# Patient Record
Sex: MALE | Race: WHITE | NOT HISPANIC OR LATINO | Employment: OTHER | URBAN - METROPOLITAN AREA
[De-identification: names, ages, dates, MRNs, and addresses within clinical notes are randomized per-mention and may not be internally consistent; named-entity substitution may affect disease eponyms.]

---

## 2017-07-24 LAB — CRC RECOMMENDATION EXT: NORMAL

## 2017-07-31 ENCOUNTER — TELEPHONE (OUTPATIENT)
Dept: NEUROLOGY | Facility: HOSPITAL | Age: 52
End: 2017-07-31

## 2017-07-31 DIAGNOSIS — C7A.012 MALIGNANT CARCINOID TUMOR OF THE ILEUM: Primary | ICD-10-CM

## 2017-07-31 RX ORDER — LANREOTIDE ACETATE 120 MG/.5ML
120 INJECTION SUBCUTANEOUS
COMMUNITY
End: 2020-05-27 | Stop reason: SDUPTHER

## 2017-07-31 NOTE — LETTER
July 31, 2017        LIS Nesbitt MD  [address] [place] [address]             [facility]  [address] [place] [address]  Phone: [phone]  Fax: [phone]   Patient: Omid Falcon   MR Number: [id number]   YOB: 1965   Date of Visit: 7/31/2017     Dear Dr. Nesbitt,     We contacted Mr. Falcon regarding setting up an appointment for an evaluation at our center.  We scheduled a MRI, octreoscan, echocardiogram, blood tumor markers and a pathology re read over the next couple of weeks.  We also scheduled an appointment with Dr. NEEL Gould MD on Friday, September 1, 2017 at 11:00 AM for recommendations.  We will forward you a copy of the clinic note after the visit.      Thank you for considering our program and for referring this patient.  If you have any questions, please do not hesitate to contact us.      Sincerely,      Tatum Thornton RN

## 2017-07-31 NOTE — TELEPHONE ENCOUNTER
----- Message from Gaby Thornton sent at 7/28/2017 10:08 AM CDT -----  Contact: Call from Sofiya with Dr. Nesbitt's office  EAW/New patient - Who called:Call from Sofiya with Dr. Nesbitt's office [phone]    Referred by: Dr. Nesbitt    Why do you need to be seen? Carcinoid tumor    Which doctor are you requesting? Bryanna    You may contact patient back to schedule at: [phone]    Dr. Nesbitt will fax over patients information today.

## 2017-07-31 NOTE — TELEPHONE ENCOUNTER
Ordered octreoscan, MRI, tumor markers and path re read per protocol. Appointment made with MD, info sent to patient. Diagnosed in 1/2016 and had surgery in 2/2016 for terminal ileum.

## 2017-07-31 NOTE — LETTER
July 31, 2017    Omid Ezekiel  [address]  [place] [address]             [facility]  Tumor Program  [address]  [address]  [place] [address]  Phone: [phone]  Fax: [phone] Dear Mr. Falcon:    Thank you for your interest in our program. It was my pleasure speaking with you today about your upcoming appointment.     You have a scheduled appointment with Dr NEEL Gould MD on Friday, September 1, 2017 at 11:00 AM. Our office is located at :    [facility]  Medical Office [facility]  Neuroendocrine Tumor Clinic  [address]  [place], [address]    You are scheduled for a consultation only with the physicians unless otherwise planned. Plan to be here for your visit for 2-3 hrs. If flight arrangements are made, plan to make the return flight after 6 pm if possible. The [place] is [place] 10 minutes from [facility].    In preparation for your appointment, we ask that you gather the information below and fax them to us ASAP. This will enable us to review all pertinent information at the time of your visit so that recommendations can be made and a plan of care developed for you.     Please return forms along with all paper records via fax asap.    Please fax  1. Insurance cards (front and back)-enlarged if possible  2. Drivers licenses  3. Current medicine list  4. Name, address & phone # of your physician for correspondence  5. Authorization for Release of Information-complete and return to clinic  6. Medical Records-send as soon as you have them together (see guidelines below)    Lab Work: If requested, the special lab markers do require special tubes that have to be ordered by the ordering facility. The lab work should be within 3 months.. The labs take 2-3 weeks to get results so please get labs drawn asap. The name and phone # of the send out lab that does the special labs tests is on the order. You can have  the labs drawn at [facility], [facility], a local hospital, or your doctors office (whichever works). If these lab tests need to be done, I will attach an Outpatient Order form. If you are doing your lab work at a facility other than [facility], call our office to notify us the date you have them drawn and the location and phone number of the lab for easy follow up.    Scans: Please mail copies of CD's of your last scans to the office asap. I would recommend sending them overnight with a tracking number in case of any problems. If you need updated scans, I will attach an Outpatient Order form.    Tissue Biopsy/Pathology: If you had a tissue biopsy or surgery, we will request to have your slides and/or tissue blocks sent to us to perform some special testing on them. Please provide us with a pathology report asa. This testing will be billed to your insurance company.     Operative or Procedure reports: All surgery or procedure reports related to your neuroendocrine tumor should be sent to us.    Insurance Company: You should contact your insurance company to inquire about your insurance coverage and benefits. Ask about co-payments and deductibles when seeing a specialist. Ask if this visit will be in Network or Out of Network. We may be able to work with you if this is out of network for you.    Lodging: Attached is lodging information from the [facility] and a list of local hotels. The [facility] is run by the [employer] and is free of charge. [facility], you must call my office and talk to [facility]. You will need to complete the application and send it to my office for a physician signature. We will forward it to the [facility] and they will check availability. If you wish to stay at the [facility], apply EARLY, they fill up quickly. You may contact the [facility] a week later to confirm your reservation and ask any questions regarding the facility. You  May only stay at the [facility] 24 hrs  prior to your appointment and up to 24 hrs after your appointment. (THIS CAN ONLY BE USED IF YOU LIVE MORE THAN 40 MILES FROM OUR FACILITY).    Call me if you have any questions, email is not the best way to communicate with our office.    We are looking forward to meeting and taking care of you. If you have any questions or concerns, please don't hesitate to call.     Sincerely,        Maria Victoria Gabriel, RN, BSN  Nurse Manager, Neuroendocrine Tumor Program

## 2017-08-02 LAB
EXT 24 HR UR METANEPHRINE: NORMAL
EXT 24 HR UR NORMETANEPHRINE: NORMAL
EXT 24 HR UR NORMETANEPHRINE: NORMAL
EXT 25 HYDROXY VIT D2: NORMAL
EXT 25 HYDROXY VIT D3: NORMAL
EXT 5 HIAA 24 HR URINE: NORMAL
EXT 5 HIAA BLOOD: NORMAL
EXT ACTH: NORMAL
EXT AFP: NORMAL
EXT ALBUMIN: 4.5 (ref 3.6–5.1)
EXT ALKALINE PHOSPHATASE: 61 (ref 40–115)
EXT ALT: 30 (ref 9–46)
EXT AMYLASE: NORMAL
EXT ANTI ISLET CELL AB: NORMAL
EXT ANTI PARIETAL CELL AB: NORMAL
EXT ANTI THYROID AB: NORMAL
EXT AST: 23 (ref 10–40)
EXT BILIRUBIN DIRECT: NORMAL MG/DL
EXT BILIRUBIN TOTAL: 0.5 (ref 0.2–1.2)
EXT BK VIRUS DNA QN PCR: NORMAL
EXT BUN: 16 (ref 7–25)
EXT C PEPTIDE: NORMAL
EXT CA 125: NORMAL
EXT CA 19-9: NORMAL
EXT CA 27-29: NORMAL
EXT CALCITONIN: NORMAL
EXT CALCIUM: 10 (ref 8.6–10.3)
EXT CEA: NORMAL
EXT CHLORIDE: 105 (ref 98–110)
EXT CHOLESTEROL: NORMAL
EXT CHROMOGRANIN A: <5
EXT CO2: 28 (ref 20–31)
EXT CREATININE UA: NORMAL
EXT CREATININE: 1.34 MG/DL (ref 0.6–1.35)
EXT CYCLOSPORONE LEVEL: NORMAL
EXT DOPAMINE: NORMAL
EXT EBV DNA BY PCR: NORMAL
EXT EPINEPHRINE: NORMAL
EXT FOLATE: NORMAL
EXT FREE T3: NORMAL
EXT FREE T4: NORMAL
EXT FSH: NORMAL
EXT GASTRIN RELEASING PEPTIDE: NORMAL
EXT GASTRIN RELEASING PEPTIDE: NORMAL
EXT GASTRIN: NORMAL
EXT GGT: NORMAL
EXT GHRELIN: NORMAL
EXT GLUCAGON: NORMAL
EXT GLUCOSE: 93 (ref 65–99)
EXT GROWTH HORMONE: NORMAL
EXT HCV RNA QUANT PCR: NORMAL
EXT HDL: NORMAL
EXT HEMATOCRIT: 44.3 (ref 38.5–50)
EXT HEMOGLOBIN A1C: NORMAL
EXT HEMOGLOBIN: 14.4 (ref 13.2–17.1)
EXT HISTAMINE 24 HR URINE: NORMAL
EXT HISTAMINE: NORMAL
EXT IGF-1: NORMAL
EXT IMMUNKNOW (NON-STIMULATED): NORMAL
EXT IMMUNKNOW (STIMULATED): NORMAL
EXT INR: NORMAL
EXT INSULIN: NORMAL
EXT LANREOTIDE LEVEL: NORMAL
EXT LDH, TOTAL: NORMAL
EXT LDL CHOLESTEROL: NORMAL
EXT LIPASE: NORMAL
EXT MAGNESIUM: NORMAL
EXT METANEPHRINE FREE PLASMA: NORMAL
EXT MOTILIN: NORMAL
EXT NEUROKININ A CAMB: NORMAL
EXT NEUROKININ A ISI: NORMAL
EXT NEUROTENSIN: NORMAL
EXT NOREPINEPHRINE: NORMAL
EXT NORMETANEPHRINE: NORMAL
EXT NSE: NORMAL
EXT OCTREOTIDE LEVEL: NORMAL
EXT PANCREASTATIN CAMB: NORMAL
EXT PANCREASTATIN ISI: NORMAL
EXT PANCREATIC POLYPEPTIDE: NORMAL
EXT PHOSPHORUS: NORMAL
EXT PLATELETS: 187 (ref 140–400)
EXT POTASSIUM: 4.2 (ref 3.5–5.3)
EXT PROGRAF LEVEL: NORMAL
EXT PROLACTIN: NORMAL
EXT PROTEIN TOTAL: 7.6 (ref 6.1–8.1)
EXT PROTEIN UA: NORMAL
EXT PT: NORMAL
EXT PTH, INTACT: NORMAL
EXT PTT: NORMAL
EXT RAPAMUNE LEVEL: NORMAL
EXT SEROTONIN: NORMAL
EXT SODIUM: 141 MMOL/L (ref 135–145)
EXT SOMATOSTATIN: NORMAL
EXT SUBSTANCE P: NORMAL
EXT TRIGLYCERIDES: NORMAL
EXT TRYPTASE: NORMAL
EXT TSH: NORMAL
EXT URIC ACID: NORMAL
EXT URINE AMYLASE U/HR: NORMAL
EXT URINE AMYLASE U/L: NORMAL
EXT VASOACTIVE INTESTINAL POLYPEPTIDE: NORMAL
EXT VITAMIN B12: NORMAL
EXT VMA 24 HR URINE: NORMAL
EXT WBC: 5.8 (ref 3.8–10.8)
NEURON SPECIFIC ENOLASE: NORMAL

## 2017-08-05 LAB
EXT 24 HR UR METANEPHRINE: ABNORMAL
EXT 24 HR UR METANEPHRINE: NORMAL
EXT 24 HR UR NORMETANEPHRINE: ABNORMAL
EXT 24 HR UR NORMETANEPHRINE: ABNORMAL
EXT 24 HR UR NORMETANEPHRINE: NORMAL
EXT 24 HR UR NORMETANEPHRINE: NORMAL
EXT 25 HYDROXY VIT D2: ABNORMAL
EXT 25 HYDROXY VIT D2: NORMAL
EXT 25 HYDROXY VIT D3: ABNORMAL
EXT 25 HYDROXY VIT D3: NORMAL
EXT 5 HIAA 24 HR URINE: ABNORMAL
EXT 5 HIAA 24 HR URINE: NORMAL
EXT 5 HIAA BLOOD: 37 NG/ML
EXT 5 HIAA BLOOD: ABNORMAL
EXT ACTH: ABNORMAL
EXT ACTH: NORMAL
EXT AFP: ABNORMAL
EXT AFP: NORMAL
EXT ALBUMIN: 4.7 (ref 3.6–5.1)
EXT ALBUMIN: NORMAL
EXT ALKALINE PHOSPHATASE: 54 (ref 40–115)
EXT ALKALINE PHOSPHATASE: NORMAL
EXT ALT: 24 (ref 9–46)
EXT ALT: NORMAL
EXT AMYLASE: ABNORMAL
EXT AMYLASE: NORMAL
EXT ANTI ISLET CELL AB: ABNORMAL
EXT ANTI ISLET CELL AB: NORMAL
EXT ANTI PARIETAL CELL AB: ABNORMAL
EXT ANTI PARIETAL CELL AB: NORMAL
EXT ANTI THYROID AB: ABNORMAL
EXT ANTI THYROID AB: NORMAL
EXT AST: 22 (ref 10–40)
EXT AST: NORMAL
EXT BILIRUBIN DIRECT: ABNORMAL MG/DL
EXT BILIRUBIN DIRECT: NORMAL MG/DL
EXT BILIRUBIN TOTAL: 0.8 (ref 0.2–1.2)
EXT BILIRUBIN TOTAL: NORMAL
EXT BK VIRUS DNA QN PCR: ABNORMAL
EXT BK VIRUS DNA QN PCR: NORMAL
EXT BUN: 26 (ref 7–25)
EXT BUN: NORMAL
EXT C PEPTIDE: ABNORMAL
EXT C PEPTIDE: NORMAL
EXT CA 125: ABNORMAL
EXT CA 125: NORMAL
EXT CA 19-9: ABNORMAL
EXT CA 19-9: NORMAL
EXT CA 27-29: ABNORMAL
EXT CA 27-29: NORMAL
EXT CALCITONIN: ABNORMAL
EXT CALCITONIN: NORMAL
EXT CALCIUM: 9.6 (ref 8.6–10.3)
EXT CALCIUM: NORMAL
EXT CEA: ABNORMAL
EXT CEA: NORMAL
EXT CHLORIDE: 105 (ref 98–110)
EXT CHLORIDE: NORMAL
EXT CHOLESTEROL: ABNORMAL
EXT CHOLESTEROL: NORMAL
EXT CHROMOGRANIN A: ABNORMAL
EXT CHROMOGRANIN A: NORMAL
EXT CO2: 27 (ref 20–31)
EXT CO2: NORMAL
EXT CREATININE UA: ABNORMAL
EXT CREATININE UA: NORMAL
EXT CREATININE: 1.18 MG/DL (ref 0.6–1.35)
EXT CREATININE: NORMAL MG/DL
EXT CYCLOSPORONE LEVEL: ABNORMAL
EXT CYCLOSPORONE LEVEL: NORMAL
EXT DOPAMINE: ABNORMAL
EXT DOPAMINE: NORMAL
EXT EBV DNA BY PCR: ABNORMAL
EXT EBV DNA BY PCR: NORMAL
EXT EPINEPHRINE: ABNORMAL
EXT EPINEPHRINE: NORMAL
EXT FOLATE: ABNORMAL
EXT FOLATE: NORMAL
EXT FREE T3: ABNORMAL
EXT FREE T3: NORMAL
EXT FREE T4: ABNORMAL
EXT FREE T4: NORMAL
EXT FSH: ABNORMAL
EXT FSH: NORMAL
EXT GASTRIN RELEASING PEPTIDE: ABNORMAL
EXT GASTRIN RELEASING PEPTIDE: ABNORMAL
EXT GASTRIN RELEASING PEPTIDE: NORMAL
EXT GASTRIN RELEASING PEPTIDE: NORMAL
EXT GASTRIN: ABNORMAL
EXT GASTRIN: NORMAL
EXT GGT: ABNORMAL
EXT GGT: NORMAL
EXT GHRELIN: ABNORMAL
EXT GHRELIN: NORMAL
EXT GLUCAGON: ABNORMAL
EXT GLUCAGON: NORMAL
EXT GLUCOSE: 116 (ref 65–99)
EXT GLUCOSE: NORMAL
EXT GROWTH HORMONE: ABNORMAL
EXT GROWTH HORMONE: NORMAL
EXT HCV RNA QUANT PCR: ABNORMAL
EXT HCV RNA QUANT PCR: NORMAL
EXT HDL: ABNORMAL
EXT HDL: NORMAL
EXT HEMATOCRIT: 43.2 (ref 38.5–50)
EXT HEMATOCRIT: NORMAL
EXT HEMOGLOBIN A1C: ABNORMAL
EXT HEMOGLOBIN A1C: NORMAL
EXT HEMOGLOBIN: 14.7 (ref 13.2–17.1)
EXT HEMOGLOBIN: NORMAL
EXT HISTAMINE 24 HR URINE: ABNORMAL
EXT HISTAMINE 24 HR URINE: NORMAL
EXT HISTAMINE: ABNORMAL
EXT HISTAMINE: NORMAL
EXT IGF-1: ABNORMAL
EXT IGF-1: NORMAL
EXT IMMUNKNOW (NON-STIMULATED): ABNORMAL
EXT IMMUNKNOW (NON-STIMULATED): NORMAL
EXT IMMUNKNOW (STIMULATED): ABNORMAL
EXT IMMUNKNOW (STIMULATED): NORMAL
EXT INR: ABNORMAL
EXT INR: NORMAL
EXT INSULIN: ABNORMAL
EXT INSULIN: NORMAL
EXT LANREOTIDE LEVEL: ABNORMAL
EXT LANREOTIDE LEVEL: NORMAL
EXT LDH, TOTAL: ABNORMAL
EXT LDH, TOTAL: NORMAL
EXT LDL CHOLESTEROL: ABNORMAL
EXT LDL CHOLESTEROL: NORMAL
EXT LIPASE: ABNORMAL
EXT LIPASE: NORMAL
EXT MAGNESIUM: ABNORMAL
EXT MAGNESIUM: NORMAL
EXT METANEPHRINE FREE PLASMA: ABNORMAL
EXT METANEPHRINE FREE PLASMA: NORMAL
EXT MOTILIN: ABNORMAL
EXT MOTILIN: NORMAL
EXT NEUROKININ A CAMB: ABNORMAL
EXT NEUROKININ A CAMB: NORMAL
EXT NEUROKININ A ISI: <10 (ref 0–40)
EXT NEUROKININ A ISI: NORMAL
EXT NEUROTENSIN: ABNORMAL
EXT NEUROTENSIN: NORMAL
EXT NOREPINEPHRINE: ABNORMAL
EXT NOREPINEPHRINE: NORMAL
EXT NORMETANEPHRINE: ABNORMAL
EXT NORMETANEPHRINE: NORMAL
EXT NSE: ABNORMAL
EXT NSE: NORMAL
EXT OCTREOTIDE LEVEL: ABNORMAL
EXT OCTREOTIDE LEVEL: NORMAL
EXT PANCREASTATIN CAMB: ABNORMAL
EXT PANCREASTATIN CAMB: NORMAL
EXT PANCREASTATIN ISI: 64 (ref 10–135)
EXT PANCREASTATIN ISI: NORMAL
EXT PANCREATIC POLYPEPTIDE: ABNORMAL
EXT PANCREATIC POLYPEPTIDE: NORMAL
EXT PHOSPHORUS: ABNORMAL
EXT PHOSPHORUS: NORMAL
EXT PLATELETS: 196 (ref 140–400)
EXT PLATELETS: NORMAL
EXT POTASSIUM: 4.5 (ref 3.5–5.3)
EXT POTASSIUM: NORMAL
EXT PROGRAF LEVEL: ABNORMAL
EXT PROGRAF LEVEL: NORMAL
EXT PROLACTIN: ABNORMAL
EXT PROLACTIN: NORMAL
EXT PROTEIN TOTAL: 7.6 (ref 6.1–8.1)
EXT PROTEIN TOTAL: NORMAL
EXT PROTEIN UA: ABNORMAL
EXT PROTEIN UA: NORMAL
EXT PT: ABNORMAL
EXT PT: NORMAL
EXT PTH, INTACT: ABNORMAL
EXT PTH, INTACT: NORMAL
EXT PTT: ABNORMAL
EXT PTT: NORMAL
EXT RAPAMUNE LEVEL: ABNORMAL
EXT RAPAMUNE LEVEL: NORMAL
EXT SEROTONIN: 131 (ref 56–244)
EXT SEROTONIN: NORMAL
EXT SODIUM: 140 MMOL/L (ref 135–146)
EXT SODIUM: NORMAL MMOL/L
EXT SOMATOSTATIN: ABNORMAL
EXT SOMATOSTATIN: NORMAL
EXT SUBSTANCE P: ABNORMAL
EXT SUBSTANCE P: NORMAL
EXT TRIGLYCERIDES: ABNORMAL
EXT TRIGLYCERIDES: NORMAL
EXT TRYPTASE: ABNORMAL
EXT TRYPTASE: NORMAL
EXT TSH: ABNORMAL
EXT TSH: NORMAL
EXT URIC ACID: ABNORMAL
EXT URIC ACID: NORMAL
EXT URINE AMYLASE U/HR: ABNORMAL
EXT URINE AMYLASE U/HR: NORMAL
EXT URINE AMYLASE U/L: ABNORMAL
EXT URINE AMYLASE U/L: NORMAL
EXT VASOACTIVE INTESTINAL POLYPEPTIDE: ABNORMAL
EXT VASOACTIVE INTESTINAL POLYPEPTIDE: NORMAL
EXT VITAMIN B12: ABNORMAL
EXT VITAMIN B12: NORMAL
EXT VMA 24 HR URINE: ABNORMAL
EXT VMA 24 HR URINE: NORMAL
EXT WBC: 5.7 (ref 3.8–10.8)
EXT WBC: NORMAL
NEURON SPECIFIC ENOLASE: ABNORMAL
NEURON SPECIFIC ENOLASE: NORMAL

## 2017-08-22 ENCOUNTER — LAB VISIT (OUTPATIENT)
Dept: LAB | Facility: HOSPITAL | Age: 52
End: 2017-08-22
Attending: SURGERY
Payer: COMMERCIAL

## 2017-08-22 DIAGNOSIS — C7A.012 MALIGNANT CARCINOID TUMOR OF THE ILEUM: ICD-10-CM

## 2017-08-22 PROCEDURE — 88323 CONSLTJ&REPRT MATRL PREP SLD: CPT | Mod: 26,,,

## 2017-08-22 PROCEDURE — 88323 CONSLTJ&REPRT MATRL PREP SLD: CPT

## 2017-08-22 PROCEDURE — 88360 TUMOR IMMUNOHISTOCHEM/MANUAL: CPT

## 2017-08-22 PROCEDURE — 88360 TUMOR IMMUNOHISTOCHEM/MANUAL: CPT | Mod: 26,59,,

## 2017-08-31 ENCOUNTER — HOSPITAL ENCOUNTER (OUTPATIENT)
Dept: RADIOLOGY | Facility: HOSPITAL | Age: 52
Discharge: HOME | End: 2017-08-31
Attending: SURGERY
Payer: COMMERCIAL

## 2017-08-31 ENCOUNTER — HOSPITAL ENCOUNTER (OUTPATIENT)
Dept: CARDIOLOGY | Facility: HOSPITAL | Age: 52
Discharge: HOME | End: 2017-08-31
Attending: SURGERY
Payer: COMMERCIAL

## 2017-08-31 DIAGNOSIS — C7A.012 MALIGNANT CARCINOID TUMOR OF THE ILEUM: ICD-10-CM

## 2017-08-31 DIAGNOSIS — C7A.012 MALIGNANT CARCINOID TUMOR OF ILEUM: Primary | ICD-10-CM

## 2017-08-31 PROCEDURE — 78804 RP LOCLZJ TUM WHBDY 2+D IMG: CPT | Mod: TC

## 2017-08-31 PROCEDURE — 93306 TTE W/DOPPLER COMPLETE: CPT

## 2017-08-31 PROCEDURE — 74183 MRI ABD W/O CNTR FLWD CNTR: CPT | Mod: 26,,, | Performed by: RADIOLOGY

## 2017-08-31 PROCEDURE — A9585 GADOBUTROL INJECTION: HCPCS | Performed by: SURGERY

## 2017-08-31 PROCEDURE — 78803 RP LOCLZJ TUM SPECT 1 AREA: CPT | Mod: TC

## 2017-08-31 PROCEDURE — 74183 MRI ABD W/O CNTR FLWD CNTR: CPT | Mod: TC

## 2017-08-31 PROCEDURE — 25500020 PHARM REV CODE 255: Performed by: SURGERY

## 2017-08-31 PROCEDURE — 93306 TTE W/DOPPLER COMPLETE: CPT | Mod: 26,,, | Performed by: INTERNAL MEDICINE

## 2017-08-31 PROCEDURE — 74150 CT ABDOMEN W/O CONTRAST: CPT | Mod: 26,,, | Performed by: RADIOLOGY

## 2017-08-31 PROCEDURE — 78804 RP LOCLZJ TUM WHBDY 2+D IMG: CPT | Mod: 26,,, | Performed by: RADIOLOGY

## 2017-08-31 PROCEDURE — 78803 RP LOCLZJ TUM SPECT 1 AREA: CPT | Mod: 26,,, | Performed by: RADIOLOGY

## 2017-08-31 RX ORDER — GADOBUTROL 604.72 MG/ML
10 INJECTION INTRAVENOUS
Status: COMPLETED | OUTPATIENT
Start: 2017-08-31 | End: 2017-08-31

## 2017-08-31 RX ADMIN — GADOBUTROL 10 ML: 604.72 INJECTION INTRAVENOUS at 11:08

## 2017-09-01 ENCOUNTER — HOSPITAL ENCOUNTER (OUTPATIENT)
Dept: RADIOLOGY | Facility: HOSPITAL | Age: 52
Discharge: HOME | End: 2017-09-01
Attending: SURGERY
Payer: COMMERCIAL

## 2017-09-01 ENCOUNTER — OFFICE VISIT (OUTPATIENT)
Dept: NEUROLOGY | Facility: HOSPITAL | Age: 52
End: 2017-09-01
Attending: SURGERY
Payer: COMMERCIAL

## 2017-09-01 VITALS
BODY MASS INDEX: 32.45 KG/M2 | HEART RATE: 55 BPM | TEMPERATURE: 98 F | DIASTOLIC BLOOD PRESSURE: 81 MMHG | WEIGHT: 226.63 LBS | HEIGHT: 70 IN | SYSTOLIC BLOOD PRESSURE: 112 MMHG

## 2017-09-01 DIAGNOSIS — C7A.012 MALIGNANT CARCINOID TUMOR OF THE ILEUM: ICD-10-CM

## 2017-09-01 DIAGNOSIS — C7B.8 SECONDARY NEUROENDOCRINE TUMOR OF LIVER: ICD-10-CM

## 2017-09-01 DIAGNOSIS — C7B.8 SECONDARY NEUROENDOCRINE TUMOR OF DISTANT LYMPH NODES: ICD-10-CM

## 2017-09-01 LAB
DIASTOLIC DYSFUNCTION: YES
ESTIMATED PA SYSTOLIC PRESSURE: 18.68
MITRAL VALVE MOBILITY: NORMAL
RETIRED EF AND QEF - SEE NOTES: 55 (ref 55–65)
TRICUSPID VALVE REGURGITATION: ABNORMAL

## 2017-09-01 PROCEDURE — 78803 RP LOCLZJ TUM SPECT 1 AREA: CPT | Mod: TC

## 2017-09-01 PROCEDURE — 99213 OFFICE O/P EST LOW 20 MIN: CPT | Mod: 25 | Performed by: SURGERY

## 2017-09-01 PROCEDURE — 78803 RP LOCLZJ TUM SPECT 1 AREA: CPT | Mod: 26,,, | Performed by: RADIOLOGY

## 2017-09-01 NOTE — PROGRESS NOTES
"NOLANETS:  [facility]ocrine Tumor Special[facility]  A collaboration between [facility] and [facility]      PATIENT: Omid Falcon  MRN: [id number]  DATE: 9/1/2017    Subjective:      Chief Complaint: Consult (referred by Dr. 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     Oncologic History:     Interval History: new liver disease found on recent MRI. New onset diarrhea, resolved since Dr Frances started lanreotide 2 mos ago.  Incr 5HIAA  By report.    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 No pertinent family history.    Allergies:  Review of patient's allergies indicates:   Allergen Reactions    Rocephin [ceftriaxone] Hives     Flushing feeling       Medications:  Current Outpatient Prescriptions   Medication Sig Dispense Refill    lanreotide (SOMATULINE DEPOT) 120 mg/0.5 mL Syrg Inject 120 mg into the skin every 28 days.      hydrocodone-acetaminophen 10-325mg (NORCO)  mg Tab Take 1 tablet by mouth every 4 to 6 hours as needed. 20 tablet 0     No current facility-administered medications for this visit.        Review of Systems   Constitutional: Positive for fatigue.   Gastrointestinal: Positive for diarrhea.      Objective:    "   Physical Exam   Constitutional: He is oriented to person, place, and time. He appears well-developed and well-nourished.   HENT:   Head: Normocephalic and atraumatic.   Mouth/Throat: Oropharynx is clear and moist. No oropharyngeal exudate.   Eyes: Conjunctivae and EOM are normal. Pupils are equal, round, and reactive to light. Right eye exhibits no discharge. Left eye exhibits no discharge. No scleral icterus.   Neck: Normal range of motion. Neck supple. No JVD present. No tracheal deviation present. No thyromegaly present.   Cardiovascular: Regular rhythm and intact distal pulses.  Exam reveals no gallop and no friction rub.    No murmur heard.  Pulmonary/Chest: Effort normal and breath sounds normal. No respiratory distress. He has no wheezes. He has no rales.   Abdominal: Soft. Bowel sounds are normal. He exhibits no distension and no mass. There is no tenderness. There is no rebound and no guarding. No hernia.   Well healed scar     Genitourinary: Penis normal.   Musculoskeletal: Normal range of motion. He exhibits no edema or tenderness.   Neurological: He is alert and oriented to person, place, and time. He has normal reflexes. No cranial nerve deficit.   Skin: Skin is warm and dry. No rash noted. He is not diaphoretic. No erythema. No pallor.   Psychiatric: He has a normal mood and affect. His behavior is normal. Thought content normal.      Assessment:       No diagnosis found.    Laboratory Data:  Neuroendocrine Labs Latest Ref Rng & Units 9/1/2017 8/31/2017 6/14/2017 12/10/2015 12/10/2015 12/9/2015 12/9/2015   EXT CHROMOGRANIN A <15 - - <5 - - - -   WBC 3.90 - 12.70 K/uL - - - 9.17 - 5.27 -   EXT WBC 3.8 - 10.8 - - 5.8 - - - -   HGB 14.0 - 18.0 g/dL - - - 14.7 - 14.9 -   EXT HGB 13.2 - 17.1 - - 14.4 - - - -   HCT 40.0 - 54.0 % - - - 43.0 - 43.7 -   EXT HCT 38.5 - 50.0 - - 44.3 - - - -   PLATLETS 150 - 350 K/uL - - - 191 - 196 -   EXT PLATLETS 140 - 400 - - 187 - - - -   GLUCOSE 70 - 110 mg/dL - - -  123(H) - 97 -   EXT GLUCOSE 65 - 99 - - 93 - - - -   BUN 9 - 21 mg/dL - - - 21 - 22(H) -   EXT BUN 7 - 25 - - 16 - - - -   CREATININE 0.5 - 1.4 mg/dL - 1.5(H) - 1.55(H) - 1.37 -   EXT CREATININE 0.60 - 1.35 mg/dL - - 1.34 - - - -    - 145 mmol/L - - - 143 - 144 -   EXT  - 145 mmol/L - - 141 - - - -   K 3.5 - 5.1 mmol/L - - - 4.0 - 4.0 -   EXT K 3.5 - 5.3 - - 4.2 - - - -   CHLORIDE 95 - 110 mmol/L - - - 104 - 103 -   EXT CHLORIDE 98 - 110 - - 105 - - - -   CO2 23 - 29 mmol/L - - - 23 - 27 -   EXT CO2 20 - 31 - - 28 - - - -   CALCIUM 8.7 - 10.5 mg/dL - - - 9.5 - 9.3 -   EXT CALCIUM 8.6 - 10.3 - - 10.0 - - - -   PROTEIN, TOTAL 6.0 - 8.4 g/dL - - - 7.8 - 7.9 -   EXT PROTEIN, TOTAL 6.1 - 8.1 - - 7.6 - - - -   ALBUMIN 3.5 - 5.2 g/dL - - - 4.5 - 4.4 -   EXT ALBUMIN 3.6 - 5.1 - - 4.5 - - - -   TOTAL BILIRUBIN 0.1 - 1.0 mg/dL - - - 0.9 - 0.5 -   EXT TOTAL BILIRUBIN 0.2 - 1.2 - - 0.5 - - - -   ALK PHOSPHATASE 38 - 145 U/L - - - 75 - 62 -   EXT ALK PHOSPHATASE 40 - 115 - - 61 - - - -   EXT SGOT (AST) 10 - 40 - - 23 - - - -   SGPT (ALT) 10 - 44 U/L - - - 36 - 36 -   EXT ALT 9 - 46 - - 30 - - - -   SERUM AMYLASE 30 - 110 U/L - - - - - 49 -   Weight - 226 lbs 10 oz - - - 235 lbs 14 oz - 235 lbs 14 oz     MRI 8/31/17  1.  Multiple small lesions scattered throughout the right hepatic lobe concerning for metastatic disease and not evident on the prior CT scan.    RECIST SUMMARY: Date of prior examination for comparison 6/21/17.    Lesion 1: Liver, segment 8 1.1 cm Series 6 Image 3.  Not seen on prior study.  Lesion 2: Liver, segment 5 1.2 cm Series 6 Image 19.  Not seen on prior study.      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 Negative O scan    Plan:       Continue lanreotide  Long D/W patient and spouse about prognosis/diseazxe process  etc.  All Q/A  Ga68 scan  Carcinoid tumor markers today CGA, pancreastatin, serotonin, neurokinin A, plasma 5HIAA  RTC after above to discuss possible debulking, ehsan.  1 hour  Copy report to Brian Nesbitt and Yvrose    Tumor board after gallium.          NEEL Gould MD, FACS  Professor of Surgery, [facility]  Neuroendocrine Surgery, Hepatic/Pancreatic & General Surgery  [address], [address]  [place]  [address]  ph. [phone]; [phone]  fax. [phone]

## 2017-09-01 NOTE — PATIENT INSTRUCTIONS
Gallium Scan at [facility] 9/12/2017 at 7am    [address]     We will discuss you in tumor board on 9/19/17 and you will follow up in clinic the following Friday

## 2017-09-12 ENCOUNTER — HOSPITAL ENCOUNTER (OUTPATIENT)
Dept: RADIOLOGY | Facility: HOSPITAL | Age: 52
Discharge: HOME | End: 2017-09-12
Attending: SURGERY
Payer: COMMERCIAL

## 2017-09-12 DIAGNOSIS — C7B.8 SECONDARY NEUROENDOCRINE TUMOR OF DISTANT LYMPH NODES: ICD-10-CM

## 2017-09-12 DIAGNOSIS — C7B.8 SECONDARY NEUROENDOCRINE TUMOR OF LIVER: ICD-10-CM

## 2017-09-12 PROCEDURE — A9587 GALLIUM GA-68: HCPCS

## 2017-09-12 PROCEDURE — 78815 PET IMAGE W/CT SKULL-THIGH: CPT | Mod: 26,PI,, | Performed by: RADIOLOGY

## 2017-09-19 ENCOUNTER — CONFERENCE (OUTPATIENT)
Dept: NEUROLOGY | Facility: HOSPITAL | Age: 52
End: 2017-09-19

## 2017-09-19 NOTE — TELEPHONE ENCOUNTER
[facility]      NEUROENDOCRINE TUMOR MULTIDISCIPLINARY TUMOR BOARD  ________________________________________________________________________    CLINIC #: [id number]  DATE: 09/19/2017    PRESENTER:   FRENCH Gould MD    REASON FOR PRESENTATION:  Scan Review - Ga scan    SPECIALITIES PRESENT:   Surgical Oncology, Medical Oncology, Pathology, Radiology and Nursing    PATIENT STATUS:  Established Patient    PRIMARY TUMOR SITE:  Ileum    METASTATIC TUMOR SITE:  Lymph Nodes    PATIENT SUMMARY:   No history exists.       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    BOARD RECOMMENDATIONS:     Continue to follow with MRI and CT scan in 6 mos and markers every 3 mos.  Jpb to call pt with plan.  Set up with Dr. Edwards or Anand after 6 mos scans.      CONSULT NEEDED:    None

## 2017-09-22 ENCOUNTER — OFFICE VISIT (OUTPATIENT)
Dept: NEUROLOGY | Facility: HOSPITAL | Age: 52
End: 2017-09-22
Attending: SURGERY
Payer: COMMERCIAL

## 2017-09-22 VITALS
HEIGHT: 70 IN | HEART RATE: 59 BPM | DIASTOLIC BLOOD PRESSURE: 78 MMHG | TEMPERATURE: 98 F | BODY MASS INDEX: 32.38 KG/M2 | WEIGHT: 226.19 LBS | SYSTOLIC BLOOD PRESSURE: 116 MMHG

## 2017-09-22 DIAGNOSIS — E34.0 CARCINOID SYNDROME: ICD-10-CM

## 2017-09-22 DIAGNOSIS — C7B.8 SECONDARY NEUROENDOCRINE TUMOR OF LIVER: Primary | ICD-10-CM

## 2017-09-22 PROBLEM — E34.00 CARCINOID SYNDROME: Status: ACTIVE | Noted: 2017-09-22

## 2017-09-22 PROCEDURE — 99213 OFFICE O/P EST LOW 20 MIN: CPT | Performed by: SURGERY

## 2017-09-22 NOTE — LETTER
September 22, 2017      [facility] [address] [place] [address]  Phone: [phone]  Fax: [phone]       Patient: Omid Falcon   YOB: 1965  Date of Visit: 09/22/2017    To Whom It May Concern:    Tex Falcon  was at [facility] on 09/22/2017 caring for his family member. He may return to work/school on 9/25/2017 with no restrictions. If you have any questions or concerns, or if I can be of further assistance, please do not hesitate to contact me.    Sincerely,        Eloina Baker LPN for   Dr. NEEL Gould

## 2017-09-22 NOTE — PROGRESS NOTES
"NOLANETS:  [facility]ocrine Tumor [facility]  A collaboration between [facility] and [facility]      PATIENT: Omid Falcon  MRN: [id number]  DATE: 9/22/2017    Subjective:      Chief Complaint: Consult (referred by Nhi Nesbitt/Yvrose for carcinoid of terminal ileum with liver mets. (had SB resection 2016) F/U after scans.      Vitals:   Vitals:    09/22/17 1351   BP: 116/78   Pulse: (!) 59   Temp: 97.9 °F (36.6 °C)   TempSrc: Oral   Weight: 102.6 kg (226 lb 3.1 oz)   Height: 5' 10" (1.778 m)        Karnofsky Score:       Diagnosis:   1. Secondary neuroendocrine tumor of liver    2. Carcinoid syndrome         Oncologic History:     Interval History: new liver disease found on recent MRI. New onset diarrhea, resolved since Dr Frances started lanreotide 2 mos ago.  Incr 5HIAA  By report.  Ga 68 scan shows only 2 small tumors in liver. MRI multiple small tumors.     TUMOR BOARD RECOMMENDATIONS:      Continue to follow with MRI and CT scan in 6 mos and markers every 3 mos.  Follow for progressio.  TACE or PRRT at time of progression  Continue lanreotide.    Set up with Dr. Edwards or Anand after 6 mos/ scans.       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 No pertinent family history.    Allergies:  Review of patient's allergies indicates:   Allergen Reactions    Rocephin [ceftriaxone] Hives     Flushing feeling       Medications:  Current Outpatient Prescriptions   Medication Sig Dispense Refill    lanreotide (SOMATULINE DEPOT) 120 mg/0.5 mL Syrg Inject 120 mg into the skin every 28 " days.       No current facility-administered medications for this visit.        Review of Systems   Constitutional: Positive for fatigue.   Gastrointestinal: Positive for diarrhea.      Objective:      Physical Exam   Constitutional: He is oriented to person, place, and time. He appears well-developed and well-nourished.   HENT:   Head: Normocephalic and atraumatic.   Mouth/Throat: Oropharynx is clear and moist. No oropharyngeal exudate.   Eyes: Conjunctivae and EOM are normal. Pupils are equal, round, and reactive to light. Right eye exhibits no discharge. Left eye exhibits no discharge. No scleral icterus.   Neck: Normal range of motion. Neck supple. No JVD present. No tracheal deviation present. No thyromegaly present.   Cardiovascular: Regular rhythm and intact distal pulses.  Exam reveals no gallop and no friction rub.    No murmur heard.  Pulmonary/Chest: Effort normal and breath sounds normal. No respiratory distress. He has no wheezes. He has no rales.   Abdominal: Soft. Bowel sounds are normal. He exhibits no distension and no mass. There is no tenderness. There is no rebound and no guarding. No hernia.   Well healed scar     Genitourinary: Penis normal.   Musculoskeletal: Normal range of motion. He exhibits no edema or tenderness.   Neurological: He is alert and oriented to person, place, and time. He has normal reflexes. No cranial nerve deficit.   Skin: Skin is warm and dry. No rash noted. He is not diaphoretic. No erythema. No pallor.   Psychiatric: He has a normal mood and affect. His behavior is normal. Thought content normal.      Assessment:       1. Secondary neuroendocrine tumor of liver    2. Carcinoid syndrome        Laboratory Data:    Neuroendocrine Labs Latest Ref Rng & Units 9/22/2017 9/1/2017 8/31/2017 8/5/2017 6/14/2017 12/10/2015 12/10/2015   EXT 5 HIAA BLOOD up to 22 ng/ml - - - 37 - - -   EXT SEROTONIN 56 - 244 - - - 131 - - -   EXT CHROMOGRANIN A <15 - - - - <5 - -   EXT PANCREASTATIN  KIM 10 - 135 - - - 64 - - -   EXT NEUROKININ A KIM 0 - 40 - - - <10 - - -   WBC 3.90 - 12.70 K/uL - - - - - 9.17 -   EXT WBC 3.8 - 10.8 - - - 5.7 5.8 - -   HGB 14.0 - 18.0 g/dL - - - - - 14.7 -   EXT HGB 13.2 - 17.1 - - - 14.7 14.4 - -   HCT 40.0 - 54.0 % - - - - - 43.0 -   EXT HCT 38.5 - 50 - - - 43.2 44.3 - -   PLATLETS 150 - 350 K/uL - - - - - 191 -   EXT PLATLETS 140 - 400 - - - 196 187 - -   GLUCOSE 70 - 110 mg/dL - - - - - 123(H) -   EXT GLUCOSE 65 - 99 - - - 116(A) 93 - -   BUN 9 - 21 mg/dL - - - - - 21 -   EXT BUN 7 - 25 - - - 26(A) 16 - -   CREATININE 0.5 - 1.4 mg/dL - - 1.5(H) - - 1.55(H) -   EXT CREATININE 0.6 - 1.35 mg/dL - - - 1.18 1.34 - -    - 145 mmol/L - - - - - 143 -   EXT  - 146 mmol/L - - - 140 141 - -   K 3.5 - 5.1 mmol/L - - - - - 4.0 -   EXT K 3.5 - 5.3 - - - 4.5 4.2 - -   CHLORIDE 95 - 110 mmol/L - - - - - 104 -   EXT CHLORIDE 98 - 110 - - - 105 105 - -   CO2 23 - 29 mmol/L - - - - - 23 -   EXT CO2 20 - 31 - - - 27 28 - -   CALCIUM 8.7 - 10.5 mg/dL - - - - - 9.5 -   EXT CALCIUM 8.6 - 10.3 - - - 9.6 10.0 - -   PROTEIN, TOTAL 6.0 - 8.4 g/dL - - - - - 7.8 -   EXT PROTEIN, TOTAL 6.1 - 8.1 - - - 7.6 7.6 - -   ALBUMIN 3.5 - 5.2 g/dL - - - - - 4.5 -   EXT ALBUMIN 3.6 - 5.1 - - - 4.7 4.5 - -   TOTAL BILIRUBIN 0.1 - 1.0 mg/dL - - - - - 0.9 -   EXT TOTAL BILIRUBIN 0.2 - 1.2 - - - 0.8 0.5 - -   ALK PHOSPHATASE 38 - 145 U/L - - - - - 75 -   EXT ALK PHOSPHATASE 40 - 115 - - - 54 61 - -   EXT SGOT (AST) 10 - 40 - - - 22 23 - -   SGPT (ALT) 10 - 44 U/L - - - - - 36 -   EXT ALT 9 - 46 - - - 24 30 - -   SERUM AMYLASE 30 - 110 U/L - - - - - - -   Weight - 226 lbs 3 oz 226 lbs 10 oz - - - - 235 lbs 14 oz     MRI 8/31/17  1.  Multiple small lesions scattered throughout the right hepatic lobe concerning for metastatic disease and not evident on the prior CT scan.    RECIST SUMMARY: Date of prior examination for comparison 6/21/17.    Lesion 1: Liver, segment 8 1.1 cm Series 6 Image 3.  Not seen on  prior study.  Lesion 2: Liver, segment 5 1.2 cm Series 6 Image 19.  Not seen on prior study.      Electronically signed by: LION OROPEZA MD  Date: 08/31/17  Time: 12:35     Encounter     View Encounter           GA68 SCAN:      1.  Two focal areas of abnormal radiotracer uptake within the right hepatic lobe corresponding to lesions characterized on recent MRI and concern for carcinoid tumor metastasis.    Impression: metaatatic carcinoid tumor to  liver, syndrome controlled with lanreotide. Negative O scan  + Ga68 scan liver.    Plan:       Continue lanreotide  Pt counseled about possibility of GB problems in future and need for nicolle Gtt if goes to OR  Long D/W patient and spouse about prognosis/diseaze process etc.  Carcinoid tumor markers Q 3 mos.   Repeat MRI , Ga68 scan in 6 mos  Intervention not indicated at this time  Copy report to Brian Nesbitt and Yvrose  Avoid epinephrine  RTC 6 mos see EW or me            NEEL Gould MD, FACS  Professor of Surgery, [facility]  Neuroendocrine Surgery, Hepatic/Pancreatic & General Surgery  [address]  [place]  [address]  ph. [phone]; [phone]  fax. [phone]

## 2018-01-31 ENCOUNTER — TELEPHONE (OUTPATIENT)
Dept: NEUROLOGY | Facility: HOSPITAL | Age: 53
End: 2018-01-31

## 2018-01-31 DIAGNOSIS — C7B.8 NEUROENDOCRINE CARCINOMA METASTATIC TO LIVER: Primary | ICD-10-CM

## 2018-01-31 DIAGNOSIS — C78.7 LIVER METASTASIS: ICD-10-CM

## 2018-01-31 DIAGNOSIS — C78.7 SECONDARY MALIGNANT NEOPLASM OF LIVER AND INTRAHEPATIC BILE DUCT: ICD-10-CM

## 2018-01-31 DIAGNOSIS — C7A.8 NEUROENDOCRINE CARCINOMA METASTATIC TO LIVER: Primary | ICD-10-CM

## 2018-01-31 NOTE — TELEPHONE ENCOUNTER
Returned pts call. Discussed scans and labs, scans scheduled on behalf of pt, lab orders sent to pts home address. New pt appt with Dr. Edwards made, pt has already been previously worked up as new pt prior to appointment with Dr. Gould.

## 2018-01-31 NOTE — TELEPHONE ENCOUNTER
----- Message from Vicki Daley sent at 1/30/2018 12:59 PM CST -----  EAW/NEW-Patient was seen initially by Dr. Gould but is 6 month follow up Dr. Gould recommended him to Dr. Edwards so he would like to make that appointment in March but he also thinks he needs testing set up as well. Please call back to assist at [phone].

## 2018-02-07 LAB
EXT 24 HR UR METANEPHRINE: ABNORMAL
EXT 24 HR UR NORMETANEPHRINE: ABNORMAL
EXT 24 HR UR NORMETANEPHRINE: ABNORMAL
EXT 25 HYDROXY VIT D2: ABNORMAL
EXT 25 HYDROXY VIT D3: ABNORMAL
EXT 5 HIAA 24 HR URINE: ABNORMAL
EXT 5 HIAA BLOOD: 15 PG/ML (ref 10–135)
EXT ACTH: ABNORMAL
EXT AFP: ABNORMAL
EXT ALBUMIN: 4.9 G/DL (ref 3.6–5.1)
EXT ALKALINE PHOSPHATASE: 54 U/L (ref 40–115)
EXT ALT: 20 U/L (ref 9–46)
EXT AMYLASE: ABNORMAL
EXT ANTI ISLET CELL AB: ABNORMAL
EXT ANTI PARIETAL CELL AB: ABNORMAL
EXT ANTI THYROID AB: ABNORMAL
EXT AST: 19 U/L (ref 10–35)
EXT BILIRUBIN DIRECT: ABNORMAL
EXT BILIRUBIN TOTAL: 0.5 MG/DL (ref 0.2–1.2)
EXT BK VIRUS DNA QN PCR: ABNORMAL
EXT BUN: 22 MG/DL (ref 7–25)
EXT C PEPTIDE: ABNORMAL
EXT CA 125: ABNORMAL
EXT CA 19-9: ABNORMAL
EXT CA 27-29: ABNORMAL
EXT CALCITONIN: ABNORMAL
EXT CALCIUM: 9.8 MG/DL (ref 8.6–10.3)
EXT CEA: ABNORMAL
EXT CHLORIDE: 107 MMOL/L (ref 98–110)
EXT CHOLESTEROL: ABNORMAL
EXT CHROMOGRANIN A: 20 NG/ML (ref 25–140)
EXT CO2: 28 MMOL/L (ref 20–31)
EXT CREATININE UA: ABNORMAL
EXT CREATININE: 1.67 MG/DL (ref 0.7–1.33)
EXT CYCLOSPORONE LEVEL: ABNORMAL
EXT DOPAMINE: ABNORMAL
EXT EBV DNA BY PCR: ABNORMAL
EXT EPINEPHRINE: ABNORMAL
EXT FOLATE: ABNORMAL
EXT FREE T3: ABNORMAL
EXT FREE T4: ABNORMAL
EXT FSH: ABNORMAL
EXT GASTRIN RELEASING PEPTIDE: ABNORMAL
EXT GASTRIN RELEASING PEPTIDE: ABNORMAL
EXT GASTRIN: ABNORMAL
EXT GGT: ABNORMAL
EXT GHRELIN: ABNORMAL
EXT GLUCAGON: ABNORMAL
EXT GLUCOSE: 129 MG/DL (ref 65–99)
EXT GROWTH HORMONE: ABNORMAL
EXT HCV RNA QUANT PCR: ABNORMAL
EXT HDL: ABNORMAL
EXT HEMATOCRIT: 43 % (ref 35.5–50)
EXT HEMOGLOBIN A1C: ABNORMAL
EXT HEMOGLOBIN: 14.9 G/DL (ref 13.2–17.1)
EXT HISTAMINE 24 HR URINE: ABNORMAL
EXT HISTAMINE: ABNORMAL
EXT IGF-1: ABNORMAL
EXT IMMUNKNOW (NON-STIMULATED): ABNORMAL
EXT IMMUNKNOW (STIMULATED): ABNORMAL
EXT INR: ABNORMAL
EXT INSULIN: ABNORMAL
EXT LANREOTIDE LEVEL: 2551 PG/ML
EXT LDH, TOTAL: ABNORMAL
EXT LDL CHOLESTEROL: ABNORMAL
EXT LIPASE: ABNORMAL
EXT MAGNESIUM: ABNORMAL
EXT METANEPHRINE FREE PLASMA: ABNORMAL
EXT MOTILIN: ABNORMAL
EXT NEUROKININ A CAMB: ABNORMAL
EXT NEUROKININ A ISI: 16 PG/ML (ref 0–40)
EXT NEUROTENSIN: ABNORMAL
EXT NOREPINEPHRINE: ABNORMAL
EXT NORMETANEPHRINE: ABNORMAL
EXT NSE: ABNORMAL
EXT OCTREOTIDE LEVEL: ABNORMAL
EXT PANCREASTATIN CAMB: ABNORMAL
EXT PANCREASTATIN ISI: 108 PG/ML (ref 10–135)
EXT PANCREATIC POLYPEPTIDE: ABNORMAL
EXT PHOSPHORUS: ABNORMAL
EXT PLATELETS: 205 1000/UL (ref 140–400)
EXT POTASSIUM: 4.3 MMOL/L (ref 3.5–5.3)
EXT PROGRAF LEVEL: ABNORMAL
EXT PROLACTIN: ABNORMAL
EXT PROTEIN TOTAL: 7.6 G/DL (ref 6.1–8.1)
EXT PROTEIN UA: ABNORMAL
EXT PT: ABNORMAL
EXT PTH, INTACT: ABNORMAL
EXT PTT: ABNORMAL
EXT RAPAMUNE LEVEL: ABNORMAL
EXT SEROTONIN: 94 NG/ML (ref 56–244)
EXT SODIUM: 144 MMOL/L (ref 135–146)
EXT SOMATOSTATIN: ABNORMAL
EXT SUBSTANCE P: ABNORMAL
EXT TRIGLYCERIDES: ABNORMAL
EXT TRYPTASE: ABNORMAL
EXT TSH: ABNORMAL
EXT URIC ACID: ABNORMAL
EXT URINE AMYLASE U/HR: ABNORMAL
EXT URINE AMYLASE U/L: ABNORMAL
EXT VASOACTIVE INTESTINAL POLYPEPTIDE: ABNORMAL
EXT VITAMIN B12: ABNORMAL
EXT VMA 24 HR URINE: ABNORMAL
EXT WBC: 5.7 1000/UL (ref 3.58–10.8)
NEURON SPECIFIC ENOLASE: ABNORMAL

## 2018-02-09 ENCOUNTER — HOSPITAL ENCOUNTER (OUTPATIENT)
Dept: RADIOLOGY | Facility: HOSPITAL | Age: 53
Discharge: HOME | End: 2018-02-09
Attending: SURGERY
Payer: COMMERCIAL

## 2018-02-09 DIAGNOSIS — C78.7 SECONDARY MALIGNANT NEOPLASM OF LIVER AND INTRAHEPATIC BILE DUCT: ICD-10-CM

## 2018-02-09 DIAGNOSIS — C7B.8 NEUROENDOCRINE CARCINOMA METASTATIC TO LIVER: ICD-10-CM

## 2018-02-09 DIAGNOSIS — C78.7 LIVER METASTASIS: ICD-10-CM

## 2018-02-09 DIAGNOSIS — C7A.8 NEUROENDOCRINE CARCINOMA METASTATIC TO LIVER: ICD-10-CM

## 2018-02-09 PROCEDURE — A9585 GADOBUTROL INJECTION: HCPCS | Performed by: SURGERY

## 2018-02-09 PROCEDURE — 74183 MRI ABD W/O CNTR FLWD CNTR: CPT | Mod: 26,,, | Performed by: RADIOLOGY

## 2018-02-09 PROCEDURE — A9587 GALLIUM GA-68: HCPCS | Mod: TB

## 2018-02-09 PROCEDURE — 74178 CT ABD&PLV WO CNTR FLWD CNTR: CPT | Mod: 26,,, | Performed by: RADIOLOGY

## 2018-02-09 PROCEDURE — 74183 MRI ABD W/O CNTR FLWD CNTR: CPT | Mod: TC

## 2018-02-09 PROCEDURE — 25500020 PHARM REV CODE 255: Performed by: SURGERY

## 2018-02-09 PROCEDURE — 78815 PET IMAGE W/CT SKULL-THIGH: CPT | Mod: 26,PS,, | Performed by: RADIOLOGY

## 2018-02-09 PROCEDURE — 74178 CT ABD&PLV WO CNTR FLWD CNTR: CPT | Mod: TC

## 2018-02-09 PROCEDURE — 78815 PET IMAGE W/CT SKULL-THIGH: CPT | Mod: TC

## 2018-02-09 RX ORDER — GADOBUTROL 604.72 MG/ML
10 INJECTION INTRAVENOUS
Status: COMPLETED | OUTPATIENT
Start: 2018-02-09 | End: 2018-02-09

## 2018-02-09 RX ADMIN — GADOBUTROL 10 ML: 604.72 INJECTION INTRAVENOUS at 11:02

## 2018-02-09 RX ADMIN — IOHEXOL 30 ML: 350 INJECTION, SOLUTION INTRAVENOUS at 11:02

## 2018-02-09 RX ADMIN — IOHEXOL 100 ML: 350 INJECTION, SOLUTION INTRAVENOUS at 01:02

## 2018-02-12 ENCOUNTER — TELEPHONE (OUTPATIENT)
Dept: NEUROLOGY | Facility: HOSPITAL | Age: 53
End: 2018-02-12

## 2018-02-12 NOTE — TELEPHONE ENCOUNTER
----- Message from Nu Negron sent at 2/12/2018 12:18 PM CST -----  Contact: [facility] [employer] [place]  EAW:  Luisana from [facility] called to ask about a code for Lanreotide draw on this patient - they cannot find it in their system.  Call back number is [phone] and any rep can assist.  Ref# is [id number].  Thank you  abc

## 2018-02-12 NOTE — TELEPHONE ENCOUNTER
Returned [facility] call to inform that they can contact KIM for collection instructions, verbalizes understanding.

## 2018-04-02 ENCOUNTER — OFFICE VISIT (OUTPATIENT)
Dept: NEUROLOGY | Facility: HOSPITAL | Age: 53
End: 2018-04-02
Attending: SURGERY
Payer: COMMERCIAL

## 2018-04-02 VITALS
DIASTOLIC BLOOD PRESSURE: 85 MMHG | BODY MASS INDEX: 34.12 KG/M2 | RESPIRATION RATE: 16 BRPM | WEIGHT: 238.31 LBS | HEIGHT: 70 IN | SYSTOLIC BLOOD PRESSURE: 119 MMHG | TEMPERATURE: 97 F | HEART RATE: 62 BPM

## 2018-04-02 DIAGNOSIS — C7B.8 SECONDARY NEUROENDOCRINE TUMOR OF DISTANT LYMPH NODES: ICD-10-CM

## 2018-04-02 DIAGNOSIS — C7A.012 MALIGNANT CARCINOID TUMOR OF THE ILEUM: ICD-10-CM

## 2018-04-02 DIAGNOSIS — C7A.012 MALIGNANT CARCINOID TUMOR OF ILEUM: Primary | ICD-10-CM

## 2018-04-02 DIAGNOSIS — C7B.8 SECONDARY NEUROENDOCRINE TUMOR OF LIVER: ICD-10-CM

## 2018-04-02 DIAGNOSIS — D49.89 NEOPLASM OF ABDOMEN: ICD-10-CM

## 2018-04-02 PROCEDURE — 99214 OFFICE O/P EST MOD 30 MIN: CPT | Performed by: SURGERY

## 2018-04-02 NOTE — PATIENT INSTRUCTIONS
Blood work / Lab work / Tumor markers every 3 mos.  Get lab work drawn at least 1 month prior to appointment. --- due May 2018 and August 2018    Scans:  CT Abd/Pelvis and MRI liver in 6 mos.  --- due in August 2018  Call Scheduling Department at [phone] to schedule scans prior to next appointment:      Return clinic appointment in 6 months with Dr. Edwards - appointment made    Echo every year - due August 2018    Consult: will come in on May 1st to see Dr. Gould for a surgical consult

## 2018-04-02 NOTE — PROGRESS NOTES
"NOLANETS:  [facility]  A collaboration between [facility] and [facility]    PATIENT: Omid Falcon  MRN: [id number]  DATE: 4/2/2018    Vitals:    04/02/18 0828   BP: 119/85   BP Location: Left arm   Pulse: 62   Resp: 16   Temp: 97.4 °F (36.3 °C)   TempSrc: Oral   Weight: 108.1 kg (238 lb 5.1 oz)   Height: 5' 10" (1.778 m)      Last 2 Weight Measurements:   Wt Readings from Last 2 Encounters:   04/02/18 108.1 kg (238 lb 5.1 oz)   09/22/17 102.6 kg (226 lb 3.1 oz)     BMI: Body mass index is 34.2 kg/m².    Karnofsky Score    Karnofsky Score:  100% - Normal, No Complaints, No Evidence of Disease       Diagnosis:   1. Malignant carcinoid tumor of ileum    2. Malignant carcinoid tumor of the ileum    3. Secondary neuroendocrine tumor of distant lymph nodes    4. Secondary neuroendocrine tumor of liver    5. Neoplasm of abdomen      Interval History: Mr. Falcon returns to the office in routine follow up of an ilea NET with savita mts liver mets and syndrome well controlled on Lanreotide    Past Medical History:   Diagnosis Date    Cancer     colon cancer    Diarrhea     Gout     Kidney stone     Malignant carcinoid tumor of ileum 02/2016    Malignant carcinoid tumors of other sites 01/2016       Past Surgical History:   Procedure Laterality Date    BACK SURGERY      COLON SURGERY      KNEE SURGERY         Review of patient's allergies indicates:   Allergen Reactions    Rocephin [ceftriaxone] Hives     Flushing feeling       Current Outpatient Prescriptions   Medication Sig Dispense Refill    lanreotide (SOMATULINE DEPOT) 120 mg/0.5 mL Syrg Inject 120 mg into the skin every 28 days.       No current facility-administered medications for this visit.        Review of Systems     Number of Days per Week Number per Day   DIARRHEA 1    BRISTOL STOOL SCALE RATING Types 4-5    FLUSHING 0    WHEEZING 0 Sob at one filght "     WEIGHT GAIN/LOSS 238 today stable   ENERGY RATING (0-10) 10        Physical Exam: deferred.       Pathology Data:        Review here at [facility]  FINAL PATHOLOGIC DIAGNOSIS  MESENTERIC MASS, BIOPSY (REVIEW OF 10 OUTSIDE SLIDES LABELED [id number]):  Well-differentiated neuroendocrine tumor, low grade (WHO Grade 1)  NET PANEL MESENTERIC MASS: BLOCK [id number] (Immunostains performed with appropriate positive and  negative controls):  -- Mitoses: less than 1 per 10hpf  -- Necrosis: Not identified  -- Ki-67: Positive; less than 1% cells staining  -- Chromogranin: Positive (intensity 2+; 90% cells)  -- Synaptophysin: Positive (intensity 2+; 90% cells)  -- CD31: Positive; 25 vessels per HPF              Laboratory Data:  Neuroendocrine Labs Latest Ref Rng & Units 2/7/2018   EXT 5 HIAA BLOOD 10 - 135 pg/ml 15   EXT SEROTONIN 56 - 244 ng/ml 94   EXT CHROMOGRANIN A 25 - 140 ng/ml 20 (A)   EXT PANCREASTATIN KIM 10 - 135 pg/ml 108   EXT NEUROKININ A KIM 0 - 40 pg/ml 16   WBC 3.90 - 12.70 K/uL    EXT WBC 3.58 - 10.8 1000/ul 5.7   HGB 14.0 - 18.0 g/dL    EXT HGB 13.2 - 17.1 g/dl 14.9   HCT 40.0 - 54.0 %    EXT HCT 35.5 - 50.0 % 43.0   PLATLETS 150 - 350 K/uL    EXT PLATLETS 140 - 400 1000/ul 205   GLUCOSE 70 - 110 mg/dL    EXT GLUCOSE 65 - 99 mg/dl 129 (A)   BUN 9 - 21 mg/dL    EXT BUN 7 - 25 mg/dl 22   CREATININE 0.5 - 1.4 mg/dL    EXT CREATININE 0.70 - 1.33 mg/dl 1.67 (A)    - 145 mmol/L    EXT  - 146 mmol/L 144   K 3.5 - 5.1 mmol/L    EXT K 3.5 - 5.3 mmol/l 4.3   CHLORIDE 95 - 110 mmol/L    EXT CHLORIDE 98 - 110 mmol/l 107   CO2 23 - 29 mmol/L    EXT CO2 20 - 31 mmol/l 28   CALCIUM 8.7 - 10.5 mg/dL    EXT CALCIUM 8.6 - 10.3 mg/dl 9.8   PROTEIN, TOTAL 6.0 - 8.4 g/dL    EXT PROTEIN, TOTAL 6.1 - 8.1 g/dl 7.6   ALBUMIN 3.5 - 5.2 g/dL    EXT ALBUMIN 3.6 - 5.1 g/dl 4.9   TOTAL BILIRUBIN 0.1 - 1.0 mg/dL    EXT TOTAL BILIRUBIN 0.2 - 1.2 mg/dl 0.5   ALK PHOSPHATASE 38 - 145 U/L    EXT ALK PHOSPHATASE 40 - 115 u/l 54   EXT  SGOT (AST) 10 - 35 u/l 19   SGPT (ALT) 10 - 44 U/L    EXT ALT 9 - 46 u/l 20   SERUM AMYLASE 30 - 110 U/L    Weight           Radiology Data:  Findings: Patient was administered 2.77 mCi of gallium 68 octreotide intravenously. There is a metastatic lesion in the inferior most tip of the right lobe of the liver SUV max 11.49, previously 10.83.    There is a metastatic lesion suspected in the dome of the liver SUV max 6.18, previously 9.51.    There is physiologic intracranial, head, neck activity. Heart and mediastinum are normal. No lymphadenopathy are seen. There is physiologic GI  adrenal and pancreatic activity. Bowel loops are unremarkable. No adenopathy is seen. No bone lesions are seen. There is postoperative change. Lungs are clear.   Impression      Liver metastases with mixed response to therapy.           MRI abdomen without and with contrast.    Comparison: 8/31/17    History: Neuroendocrine tumor    Results: Axial gradient T2, axial T1 in and out of phase, axial T2 SSFSE, coronal T2 gradient and T2 SSFSE followed by pre-contrast axial T1 gradient fat sat and dynamic post contrast images were obtained. The patient received  10 cc of IV Gadovist contrast.    Left lung base 6 mm pulmonary nodule.    Lesion 1: Hepatic segment 8, measuring 1.3 cm, series 1302, image 16, previously measuring 1.1 cm.  This lesion enhances.  Lesion 2: Tip of the right hepatic lobe, 1.0 cm, series 5 image 22, previously measuring 1.2 cm.  No significant enhancement seen.  No additional liver lesions.  The biliary ducts are not dilated.  The gallbladder is present.  The stomach, pancreas, spleen, adrenal glands and kidneys appear normal.  Bilateral small kidney cysts, unchanged.  The aorta tapers normally.  The osseous structures demonstrate no osseous lesions.  No ascites.  Postoperative changes of the lower lumbar spine.   Impression         2 small liver lesions, unchanged.  Kidney cysts.  Small left lower lobe pulmonary  nodule, unchanged from 12/9/15 CT.  RECIST SUMMARY: Date of prior examination for comparison 8/31/17    Lesion 1: Hepatic segment 8, measuring 1.3 cm, series 1302, image 16, previously measuring 1.1 cm.  This lesion enhances.  Lesion 2: Tip of the right hepatic lobe, 1.0 cm, series 5 image 22, previously measuring 1.2 cm.  No significant enhancement seen.     CT abdomen and pelvis with  IV and oral contrast.    Comparison: 6/21/17.    Results: 5 mm axial images of the abdomen and pelvis were obtained before and after the administration of 100 cc of omni-350 IV contrast and oral contrast.  Arterial, venous and delayed images obtained.  Coronal and sagittal reformat images were generated.    There is a stable appearing 6 mm pulmonary nodule left lung base, no new nodules seen.  There is a 1.2 cm hypoattenuating lesion segment 8 of the liver.  On series 5 image 9.  Smaller hypoattenuating lesion noted, series 5 image 11.  Small hypoattenuating lesion noted at the tip of the right lobe of the liver, less conspicuous than seen on the MRI done this date.  No additional lesions identified.  The biliary ducts are not dilated.  The gallbladder is present.  The stomach, pancreas, spleen, adrenal glands and bilateral kidneys appear normal.  Bilateral small kidney cysts. The aorta tapers normally.  No aortic lymphadenopathy.  The SC cysts of the rectus abdominis muscle with partial herniation of bowel.  No significant stool retention.  No bowel dilation.  The bladder, prostate and seminal vesicles demonstrate nothing unusual.  Postoperative changes of the lower lumbar spine.  No osseous lesion seen.   Impression         Very small liver lesions seen correlating with the MRI done this day.  These lesions were not definitely identified on the prior CT study which could be due to different technique of scanning.  Bilateral kidney cysts.  Postoperative changes of the lumbar spine.    RECIST SUMMARY: Date of prior examination for  comparison 6/21/17    Lesion 1: Segment 8, measuring 1.3 cm, series 5, image 9, not seen on the prior recent study.  Lesion 2: Tip of the right lobe of the liver, less than a centimeter, series 5, image 35, not seen on the prior study better seen on MRI done this date.     mpression:  1.  lesions in liver       Plan: restage in 6 month-- ask JPB to evaluate the gallium and mri to see if he thinks these are amenible to transabdominal ablation-- then later do tace IF PROGRESSES       Labs: Markers: 3 month intervals             Scans: 6 month intervals    Scopes: 6 month intervals    Echocardiogram: 12 month intervals august this year    Return to Clinic:6 month intervals    Orders placed this visit:   Orders Placed This Encounter   Procedures    CT Abdomen Pelvis With Contrast    MRI Abdomen W WO Contrast    5-HIAA Plasma (Neuoendocrine)    Serotonin serum    Pancreastatin    Neurokinin A    Lanreotide Drug Levels    CBC auto differential    Comprehensive metabolic panel    2D echo with color flow doppler        25 Minutes Face-to-Face; Counseling/Coordination of Care > 50 percent of Visit     Jim Edwards MD, FACS  The [employer] Mcin[employer] Professor of Surgery, [facility]  [address]  [place]  [address]  Cell [phone]  ph. [phone]; [phone]  fax. [phone]  [email or web address]

## 2018-04-02 NOTE — LETTER
April 2, 2018        LIS Nesbitt MD       NOLANETS: [facility]  A collaboration between [facility] and [facility] [address]  [address]  [place] [address]  Phone: [phone]  Fax: [phone]        NEEL Gould M.D., FACS  Yeison Mcgill M.D.  Houston Aquino M.D.   Joan Miller M.D., FACS  DO Jim Wyatt M.D., FACS   Patient: Omid Falcon   MR Number: [id number]   YOB: 1965   Date of Visit: 4/2/2018     Dear Dr. Nesbitt,    Thank you for the kind referral of Omid Falcon. We saw this patient on 4/2/2018, and a copy of our clinic note is enclosed. We certainly appreciate the opportunity to participate in your patient's care.     If you have any questions or wish to discuss your patient further, please do not hesitate to contact us at [phone].       Kindest personal regards,          Jim Edwards MD, FACS  The Sebasti[employer] Mcintyre Professor of Surgery & Neurosciences  [facility], Dept. Of Surgery  [address], [address]  [place] [address] [phone]

## 2018-05-01 ENCOUNTER — OFFICE VISIT (OUTPATIENT)
Dept: NEUROLOGY | Facility: HOSPITAL | Age: 53
End: 2018-05-01
Attending: SURGERY
Payer: COMMERCIAL

## 2018-05-01 VITALS
HEIGHT: 71 IN | DIASTOLIC BLOOD PRESSURE: 73 MMHG | WEIGHT: 238.31 LBS | SYSTOLIC BLOOD PRESSURE: 109 MMHG | HEART RATE: 67 BPM | BODY MASS INDEX: 33.36 KG/M2 | TEMPERATURE: 98 F

## 2018-05-01 DIAGNOSIS — C7B.8 SECONDARY NEUROENDOCRINE TUMOR OF DISTANT LYMPH NODES: ICD-10-CM

## 2018-05-01 DIAGNOSIS — C7B.8 SECONDARY NEUROENDOCRINE TUMOR OF LIVER: Primary | ICD-10-CM

## 2018-05-01 DIAGNOSIS — E34.0 CARCINOID SYNDROME: ICD-10-CM

## 2018-05-01 DIAGNOSIS — C7A.012 MALIGNANT CARCINOID TUMOR OF THE ILEUM: ICD-10-CM

## 2018-05-01 LAB
EXT 24 HR UR METANEPHRINE: ABNORMAL
EXT 24 HR UR NORMETANEPHRINE: ABNORMAL
EXT 24 HR UR NORMETANEPHRINE: ABNORMAL
EXT 25 HYDROXY VIT D2: ABNORMAL
EXT 25 HYDROXY VIT D3: ABNORMAL
EXT 5 HIAA 24 HR URINE: ABNORMAL
EXT 5 HIAA BLOOD: 16 NG/ML (ref 0–22)
EXT ACTH: ABNORMAL
EXT AFP: ABNORMAL
EXT ALBUMIN: 4.6 G/DL (ref 3.6–5.1)
EXT ALKALINE PHOSPHATASE: 55 U/L (ref 40–115)
EXT ALT: 27 U/L (ref 9–46)
EXT AMYLASE: ABNORMAL
EXT ANTI ISLET CELL AB: ABNORMAL
EXT ANTI PARIETAL CELL AB: ABNORMAL
EXT ANTI THYROID AB: ABNORMAL
EXT AST: 20 U/L (ref 10–35)
EXT BILIRUBIN DIRECT: ABNORMAL
EXT BILIRUBIN TOTAL: 0.5 MG/DL (ref 0.2–1.2)
EXT BK VIRUS DNA QN PCR: ABNORMAL
EXT BUN: 18 MG/DL (ref 7–25)
EXT C PEPTIDE: ABNORMAL
EXT CA 125: ABNORMAL
EXT CA 19-9: ABNORMAL
EXT CA 27-29: ABNORMAL
EXT CALCITONIN: ABNORMAL
EXT CALCIUM: 9.5 MG/DL (ref 8.6–10.3)
EXT CEA: ABNORMAL
EXT CHLORIDE: 106 MMOL/L (ref 98–110)
EXT CHOLESTEROL: ABNORMAL
EXT CHROMOGRANIN A: ABNORMAL
EXT CO2: 27 MMOL/L (ref 20–31)
EXT CREATININE UA: ABNORMAL
EXT CREATININE: 1.11 MG/DL (ref 0.7–1.33)
EXT CYCLOSPORONE LEVEL: ABNORMAL
EXT DOPAMINE: ABNORMAL
EXT EBV DNA BY PCR: ABNORMAL
EXT EPINEPHRINE: ABNORMAL
EXT FOLATE: ABNORMAL
EXT FREE T3: ABNORMAL
EXT FREE T4: ABNORMAL
EXT FSH: ABNORMAL
EXT GASTRIN RELEASING PEPTIDE: ABNORMAL
EXT GASTRIN RELEASING PEPTIDE: ABNORMAL
EXT GASTRIN: ABNORMAL
EXT GGT: ABNORMAL
EXT GHRELIN: ABNORMAL
EXT GLUCAGON: ABNORMAL
EXT GLUCOSE: 97 MG/DL (ref 65–99)
EXT GROWTH HORMONE: ABNORMAL
EXT HCV RNA QUANT PCR: ABNORMAL
EXT HDL: ABNORMAL
EXT HEMATOCRIT: 43.5 % (ref 38.5–50)
EXT HEMOGLOBIN A1C: ABNORMAL
EXT HEMOGLOBIN: 14.9 G/DL (ref 13.2–17.1)
EXT HISTAMINE 24 HR URINE: ABNORMAL
EXT HISTAMINE: ABNORMAL
EXT IGF-1: ABNORMAL
EXT IMMUNKNOW (NON-STIMULATED): ABNORMAL
EXT IMMUNKNOW (STIMULATED): ABNORMAL
EXT INR: ABNORMAL
EXT INSULIN: ABNORMAL
EXT LANREOTIDE LEVEL: 1659 PG/ML
EXT LDH, TOTAL: ABNORMAL
EXT LDL CHOLESTEROL: ABNORMAL
EXT LIPASE: ABNORMAL
EXT MAGNESIUM: ABNORMAL
EXT METANEPHRINE FREE PLASMA: ABNORMAL
EXT MOTILIN: ABNORMAL
EXT NEUROKININ A CAMB: ABNORMAL
EXT NEUROKININ A ISI: 10 PG/ML (ref 0–40)
EXT NEUROTENSIN: ABNORMAL
EXT NOREPINEPHRINE: ABNORMAL
EXT NORMETANEPHRINE: ABNORMAL
EXT NSE: ABNORMAL
EXT OCTREOTIDE LEVEL: ABNORMAL
EXT PANCREASTATIN CAMB: ABNORMAL
EXT PANCREASTATIN ISI: 55 PG/ML (ref 10–135)
EXT PANCREATIC POLYPEPTIDE: ABNORMAL
EXT PHOSPHORUS: ABNORMAL
EXT PLATELETS: 187 1000/UL (ref 140–400)
EXT POTASSIUM: 4.6 MMOL/L (ref 3.5–5.3)
EXT PROGRAF LEVEL: ABNORMAL
EXT PROLACTIN: ABNORMAL
EXT PROTEIN TOTAL: 7.4 G/DL (ref 6.1–8.1)
EXT PROTEIN UA: ABNORMAL
EXT PT: ABNORMAL
EXT PTH, INTACT: ABNORMAL
EXT PTT: ABNORMAL
EXT RAPAMUNE LEVEL: ABNORMAL
EXT SEROTONIN: 104 NG/ML (ref 56–244)
EXT SODIUM: 140 MMOL/L (ref 135–146)
EXT SOMATOSTATIN: ABNORMAL
EXT SUBSTANCE P: ABNORMAL
EXT TRIGLYCERIDES: ABNORMAL
EXT TRYPTASE: ABNORMAL
EXT TSH: ABNORMAL
EXT URIC ACID: ABNORMAL
EXT URINE AMYLASE U/HR: ABNORMAL
EXT URINE AMYLASE U/L: ABNORMAL
EXT VASOACTIVE INTESTINAL POLYPEPTIDE: ABNORMAL
EXT VITAMIN B12: ABNORMAL
EXT VMA 24 HR URINE: ABNORMAL
EXT WBC: 4.9 1000/UL (ref 3.8–10.8)
NEURON SPECIFIC ENOLASE: ABNORMAL

## 2018-05-01 PROCEDURE — 99214 OFFICE O/P EST MOD 30 MIN: CPT | Performed by: SURGERY

## 2018-05-01 NOTE — PATIENT INSTRUCTIONS
Patient Instructions    3 days prior to surgery     Take a Hibiclens shower twice a day for 3 days prior to surgery, including the morning of surgery.   Gargle with Listerine twice a day for 2 days prior to surgery, including the morning of surgery.       To be determined     Appointment with Anesthesia    Pre [place] for Hospital Admission - Go to 1st floor of the hospital-admitting desk. You will do paper work, get blood drawn,  get x-rays and see Anesthesia at this time.     Day prior to surgery   CLEAR LIQUIDS all day   Start bowel prep  Ducolax Laxative tablets - 2 tablets at 12 noon  Magnesium Citrate - 1 bottle at 2 pm   Do not eat or drink anything after midnight.                 Day of surgery   Hospital Admission for surgery.  Report to 2nd floor, Same Day Surgery desk at 7:00am   Surgery is scheduled for 9:00am        [facility] [address]  [place]  [address]  [phone]      INFORMATION REGARDING YOUR PROCEDURE      We look forward to serving you and your family and appreciate that you have chosen [facility] for your healthcare needs. Before, during, and after your surgery, you will be cared for by skilled medical professionals. Our surgeons, anesthesiologists, nurses,  and other healthcare professionals will work with you and your family to ensure a safe, smooth and comfortable surgery and recovery.    In order to best meet your pre-admission needs, your surgeon or ordering physicians office will contact our Scheduling Office at [phone] and schedule a Pre-Procedure Appointment.  This should preferably be done 72 hours or greater before your scheduled procedure date.     During your pre-procedure visit your insurance will be verified for your procedure. You will meet with a Registered Nurse and an Anesthesiologist or Nurse Anesthetist. If tests are required, they will be performed during your visit. Please allow about one and a half hours for  this visit.      You will need to bring the following information or items with you to your Pre-Procedure Appointment:    1.  Picture Identification  2.  Insurance Card  3.  Current list of medications to include name and dosage  4.  List of allergies  5.  Orders and any other forms your doctor has given to you  6.  Copies of lab results performed at other facilities. This may include blood work, EKGs or chest xrays.   These results can be faxed to [phone].    The Pre-Op Center Registration Desk is located on the first floor of the hospital ([address] [place] [address]) in the [facility].  The Pre-Operative Center is located on the second floor of the hospital. Someone will walk you from the registration area to the Pre-Operative Center.    Free parking is located in the front of the hospital and medical office building and is easily accessed from [address] and [address]. When you arrive, please check in at the main information desk of the hospital.    Please call Outpatient Surgery at [phone] after 12:00pm on the day before your procedure for your arrival time and updated procedure time.      Pre-Op Bathing Instructions    Before surgery, you can play an important role in your own health.    Because skin is not sterile, we need to be sure that your skin is as free of germs as possible. By following the instructions below, you can reduce the number of germs on your skin before surgery.    IMPORTANT: You will need to shower with a special soap called Hibiclens*, available at any pharmacy.  If you are allergic to Chlorhexidine (the antiseptic in Hibiclens), use an antibacterial soap such as Dial Soap for your preoperative shower.  You will shower with Hibiclens both the night before your surgery and the morning of your surgery.  Do not use Hibiclens on the head, face or genitals to avoid injury to those areas.    STEP #1: THE NIGHT BEFORE YOUR SURGERY     1. Do not shave the area of your  body where your surgery will be performed.  2. Shower and wash your hair and body as usual with your normal soap and shampoo.  3. Rinse your hair and body thoroughly after you shower to remove all soap residue.  4. With your hand, apply one packet of Hibiclens soap to the surgical site.   5. Wash the site gently for five (5) minutes. Do not scrub your skin too hard.   6. Do not wash with your regular soap after Hibiclens is used.  7. Rinse your body thoroughly.  8. Pat yourself dry with a clean, soft towel.  9. Do not use lotion, cream, or powder.  10. Wear clean clothes.    STEP #2: THE MORNING OF YOUR SURGERY     1. Repeat Step #1.    * Not to be used by people allergic to Chlorhexidine.

## 2018-05-01 NOTE — PROGRESS NOTES
"NOLANETS:  [facility]  A collaboration between [facility] and [facility]      PATIENT: Omid Falcon  MRN: [id number]  DATE: 5/1/2018    Subjective:      Chief Complaint: Follow-up (f/u visit for possible surgery, ref from [facility] )      Vitals:   Vitals:    05/01/18 0948   BP: 109/73   Pulse: 67   Temp: 98 °F (36.7 °C)   TempSrc: Oral   Weight: 108.1 kg (238 lb 5.1 oz)   Height: 5' 11" (1.803 m)        Karnofsky Score:   Karnofsky Score    Karnofsky Score:  90% - Able to Carry on Normal Activity: Minor Symptoms of Disease         Diagnosis:   1. Secondary neuroendocrine tumor of liver    2. Carcinoid syndrome    3. Malignant carcinoid tumor of the ileum    4. Secondary neuroendocrine tumor of distant lymph nodes         Oncologic History:     TI carcinoid  R colectomy  2/2016     Recurrence in liver now.  Lanreotide 120 mg Q month    Interval History: referred by rBian Edwards/Delicia.  New tumors in liver. Mild syndrome controlled with meds.  Still has gallbladder.     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 No pertinent family history.    Allergies:  Review of patient's allergies indicates:   Allergen Reactions    Rocephin [ceftriaxone] Hives     Flushing feeling       Medications:  Current Outpatient Prescriptions   Medication Sig Dispense Refill    lanreotide (SOMATULINE DEPOT) 120 mg/0.5 mL Syrg Inject 120 mg into the skin every 28 days.       No current facility-administered medications for this visit.        Review of Systems   Constitutional: Negative.  " Negative for appetite change, chills, fatigue, fever and unexpected weight change.   HENT: Negative.  Negative for congestion, hearing loss and sore throat.    Eyes: Negative.  Negative for pain, discharge and itching.   Respiratory: Negative.  Negative for cough, chest tightness, shortness of breath and wheezing.    Cardiovascular: Negative.  Negative for chest pain, palpitations and leg swelling.   Gastrointestinal: Positive for abdominal distention (hernias) and diarrhea (occaisional). Negative for abdominal pain, blood in stool, constipation, nausea and vomiting.   Endocrine: Negative.  Negative for cold intolerance, heat intolerance and polydipsia.   Genitourinary: Negative.  Negative for difficulty urinating, dysuria and flank pain.   Musculoskeletal: Negative.  Negative for arthralgias, joint swelling and myalgias.   Skin: Negative.  Negative for color change, pallor and rash.   Allergic/Immunologic: Negative.    Neurological: Negative.  Negative for dizziness, syncope and light-headedness.   Hematological: Negative.  Negative for adenopathy. Does not bruise/bleed easily.   Psychiatric/Behavioral: Negative.  Negative for agitation, confusion, dysphoric mood, hallucinations, sleep disturbance and suicidal ideas. The patient is not nervous/anxious.    All other systems reviewed and are negative.     Objective:      Physical Exam   Constitutional: He is oriented to person, place, and time. He appears well-developed and well-nourished. No distress.   HENT:   Head: Normocephalic and atraumatic.   Eyes: Conjunctivae and EOM are normal. Pupils are equal, round, and reactive to light. No scleral icterus.   Neck: Normal range of motion. Neck supple. No JVD present. No tracheal deviation present.   Cardiovascular: Normal rate, regular rhythm, normal heart sounds and intact distal pulses.    Pulmonary/Chest: Effort normal and breath sounds normal. No respiratory distress. He has no wheezes.   Abdominal: Soft. Bowel  sounds are normal. He exhibits no distension and no mass. There is no tenderness. There is no rebound and no guarding. A hernia (incisional , upper abdomen) is present.   Musculoskeletal: Normal range of motion. He exhibits no edema or tenderness.   Neurological: He is alert and oriented to person, place, and time. He has normal reflexes.   Skin: Skin is warm and dry. No rash noted. He is not diaphoretic. No erythema. No pallor.   Psychiatric: He has a normal mood and affect. His behavior is normal. Thought content normal.   Nursing note and vitals reviewed.     Assessment:       1. Secondary neuroendocrine tumor of liver    2. Carcinoid syndrome    3. Malignant carcinoid tumor of the ileum    4. Secondary neuroendocrine tumor of distant lymph nodes        Laboratory Data:  Neuroendocrine Labs Latest Ref Rng & Units 5/1/2018 4/2/2018 2/7/2018 9/22/2017 9/1/2017 8/31/2017 8/5/2017   EXT 5 HIAA BLOOD 10 - 135 pg/ml - - 15 - - - 37   EXT SEROTONIN 56 - 244 ng/ml - - 94 - - - 131   EXT CHROMOGRANIN A 25 - 140 ng/ml - - 20(A) - - - -   EXT PANCREASTATIN KIM 10 - 135 pg/ml - - 108 - - - 64   EXT NEUROKININ A KIM 0 - 40 pg/ml - - 16 - - - <10   WBC 3.90 - 12.70 K/uL - - - - - - -   EXT WBC 3.58 - 10.8 1000/ul - - 5.7 - - - 5.7   HGB 14.0 - 18.0 g/dL - - - - - - -   EXT HGB 13.2 - 17.1 g/dl - - 14.9 - - - 14.7   HCT 40.0 - 54.0 % - - - - - - -   EXT HCT 35.5 - 50.0 % - - 43.0 - - - 43.2   PLATLETS 150 - 350 K/uL - - - - - - -   EXT PLATLETS 140 - 400 1000/ul - - 205 - - - 196   GLUCOSE 70 - 110 mg/dL - - - - - - -   EXT GLUCOSE 65 - 99 mg/dl - - 129(A) - - - 116(A)   BUN 9 - 21 mg/dL - - - - - - -   EXT BUN 7 - 25 mg/dl - - 22 - - - 26(A)   CREATININE 0.5 - 1.4 mg/dL - - - - - 1.5(H) -   EXT CREATININE 0.70 - 1.33 mg/dl - - 1.67(A) - - - 1.18    - 145 mmol/L - - - - - - -   EXT  - 146 mmol/L - - 144 - - - 140   K 3.5 - 5.1 mmol/L - - - - - - -   EXT K 3.5 - 5.3 mmol/l - - 4.3 - - - 4.5   CHLORIDE 95 - 110 mmol/L  - - - - - - -   EXT CHLORIDE 98 - 110 mmol/l - - 107 - - - 105   CO2 23 - 29 mmol/L - - - - - - -   EXT CO2 20 - 31 mmol/l - - 28 - - - 27   CALCIUM 8.7 - 10.5 mg/dL - - - - - - -   EXT CALCIUM 8.6 - 10.3 mg/dl - - 9.8 - - - 9.6   PROTEIN, TOTAL 6.0 - 8.4 g/dL - - - - - - -   EXT PROTEIN, TOTAL 6.1 - 8.1 g/dl - - 7.6 - - - 7.6   ALBUMIN 3.5 - 5.2 g/dL - - - - - - -   EXT ALBUMIN 3.6 - 5.1 g/dl - - 4.9 - - - 4.7   TOTAL BILIRUBIN 0.1 - 1.0 mg/dL - - - - - - -   EXT TOTAL BILIRUBIN 0.2 - 1.2 mg/dl - - 0.5 - - - 0.8   ALK PHOSPHATASE 38 - 145 U/L - - - - - - -   EXT ALK PHOSPHATASE 40 - 115 u/l - - 54 - - - 54   EXT SGOT (AST) 10 - 35 u/l - - 19 - - - 22   SGPT (ALT) 10 - 44 U/L - - - - - - -   EXT ALT 9 - 46 u/l - - 20 - - - 24   SERUM AMYLASE 30 - 110 U/L - - - - - - -   Weight - 238 lbs 5 oz 238 lbs 5 oz - 226 lbs 3 oz 226 lbs 10 oz - -   Review here at [facility]  FINAL PATHOLOGIC DIAGNOSIS  MESENTERIC MASS, BIOPSY (REVIEW OF 10 OUTSIDE SLIDES LABELED [id number]):  Well-differentiated neuroendocrine tumor, low grade (WHO Grade 1)  NET PANEL MESENTERIC MASS: BLOCK [id number] (Immunostains performed with appropriate positive and  negative controls):  -- Mitoses: less than 1 per 10hpf  -- Necrosis: Not identified  -- Ki-67: Positive; less than 1% cells staining  -- Chromogranin: Positive (intensity 2+; 90% cells)  -- Synaptophysin: Positive (intensity 2+; 90% cells)  -- CD31: Positive; 25 vessels per HPF                     Impression: Liver mets dome and R inferior, looks resectable possible laparoscopically , needs cholecystectomy. Dome lesion not amenable to perc ablation due to location    Plan:         Laparoscopic vs open liver R/S, ablation, ehsan, hernia repair +/- mesh       Risks/benefits explained and accepted.  All questions answered.  possibl eOR 5/14, 28, or  6/4. 6/11  patietn will bertrand with date after work arrangements made.    NEEL Gould MD, FACS  Professor of Surgery, [facility]  Neuroendocrine Surgery,  Hepatic/Pancreatic & General Surgery  [address]  [place]  [address]  ph. [phone]; [phone]  fax. [phone]

## 2018-05-03 ENCOUNTER — TELEPHONE (OUTPATIENT)
Dept: NEUROLOGY | Facility: HOSPITAL | Age: 53
End: 2018-05-03

## 2018-05-03 DIAGNOSIS — K43.2 INCISIONAL HERNIA, WITHOUT OBSTRUCTION OR GANGRENE: Primary | ICD-10-CM

## 2018-05-03 DIAGNOSIS — C7A.012 MALIGNANT CARCINOID TUMOR OF THE ILEUM: ICD-10-CM

## 2018-05-03 DIAGNOSIS — C78.7 SECONDARY MALIGNANT NEOPLASM OF LIVER: ICD-10-CM

## 2018-05-03 DIAGNOSIS — C7B.8 SECONDARY NEUROENDOCRINE TUMOR OF DISTANT LYMPH NODES: ICD-10-CM

## 2018-05-03 NOTE — TELEPHONE ENCOUNTER
----- Message from Nu Negron sent at 5/3/2018  3:08 PM CDT -----  Contact: Patient  JPB:  Patient called to discuss scheduling his surgery.  He can be reached at [phone].  Thanks  abc

## 2018-05-03 NOTE — TELEPHONE ENCOUNTER
Returned pts call. Pt selected OR date of 6/18/18. Reinforced pre-op instructions, notified that pt would receive a call w/ preop appt, pt verbalizes understanding. Pt already consented for OR.

## 2018-05-31 ENCOUNTER — TELEPHONE (OUTPATIENT)
Dept: NEUROLOGY | Facility: HOSPITAL | Age: 53
End: 2018-05-31

## 2018-05-31 NOTE — TELEPHONE ENCOUNTER
Procedure is authorized for 6/18/18.  Auth# is [id number].  Auth is scanned into media.  Thanks  abc

## 2018-05-31 NOTE — TELEPHONE ENCOUNTER
----- Message from Eloina Baker LPN sent at 5/3/2018  4:23 PM CDT -----  Nu,   Can we please get auth for this pt for 6/18?  DX: C7A.012, C78.7, C7B.8  CPT: [id number], [id number], [id number]    Thanks!  Whit

## 2018-06-11 ENCOUNTER — ANESTHESIA EVENT (OUTPATIENT)
Dept: SURGERY | Facility: HOSPITAL | Age: 53
DRG: 406 | End: 2018-06-11
Payer: COMMERCIAL

## 2018-06-12 ENCOUNTER — HOSPITAL ENCOUNTER (OUTPATIENT)
Dept: PREADMISSION TESTING | Facility: HOSPITAL | Age: 53
Discharge: HOME | End: 2018-06-12
Attending: SURGERY
Payer: COMMERCIAL

## 2018-06-12 ENCOUNTER — CLINICAL SUPPORT (OUTPATIENT)
Dept: LAB | Facility: HOSPITAL | Age: 53
End: 2018-06-12
Attending: ANESTHESIOLOGY
Payer: COMMERCIAL

## 2018-06-12 VITALS
HEIGHT: 71 IN | TEMPERATURE: 98 F | SYSTOLIC BLOOD PRESSURE: 137 MMHG | DIASTOLIC BLOOD PRESSURE: 91 MMHG | WEIGHT: 238.13 LBS | BODY MASS INDEX: 33.34 KG/M2 | OXYGEN SATURATION: 96 % | RESPIRATION RATE: 18 BRPM | HEART RATE: 74 BPM

## 2018-06-12 DIAGNOSIS — Z01.818 PRE-OP TESTING: ICD-10-CM

## 2018-06-12 DIAGNOSIS — C7B.8 SECONDARY NEUROENDOCRINE TUMOR OF LIVER: ICD-10-CM

## 2018-06-12 DIAGNOSIS — C7B.8 SECONDARY NEUROENDOCRINE TUMOR OF LIVER: Primary | ICD-10-CM

## 2018-06-12 PROCEDURE — 93005 ELECTROCARDIOGRAM TRACING: CPT

## 2018-06-12 RX ORDER — LIDOCAINE HYDROCHLORIDE 10 MG/ML
1 INJECTION, SOLUTION EPIDURAL; INFILTRATION; INTRACAUDAL; PERINEURAL ONCE
Status: CANCELLED | OUTPATIENT
Start: 2018-06-12 | End: 2018-06-12

## 2018-06-12 RX ORDER — SODIUM CHLORIDE, SODIUM LACTATE, POTASSIUM CHLORIDE, CALCIUM CHLORIDE 600; 310; 30; 20 MG/100ML; MG/100ML; MG/100ML; MG/100ML
INJECTION, SOLUTION INTRAVENOUS CONTINUOUS
Status: CANCELLED | OUTPATIENT
Start: 2018-06-12

## 2018-06-12 NOTE — ANESTHESIA PREPROCEDURE EVALUATION
06/12/2018  Omid Falcon is a 52 y.o., male with carcinoid for ex lap, hepatic resection, ehsan, hernia repair, RFA liver under [facility].    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     Past Surgical History:   Procedure Laterality Date    BACK SURGERY      COLON SURGERY      KNEE SURGERY       Patient Active Problem List   Diagnosis    Ureterolithiasis    Secondary neuroendocrine tumor of liver    Secondary neuroendocrine tumor of distant lymph nodes    Carcinoid syndrome    Malignant carcinoid tumor of the ileum     Wt Readings from Last 3 Encounters:   06/12/18 108 kg (238 lb 1.6 oz)   05/01/18 108.1 kg (238 lb 5.1 oz)   04/02/18 108.1 kg (238 lb 5.1 oz)     Temp Readings from Last 3 Encounters:   06/12/18 36.6 °C (97.9 °F) (Oral)   05/01/18 36.7 °C (98 °F) (Oral)   04/02/18 36.3 °C (97.4 °F) (Oral)     BP Readings from Last 3 Encounters:   06/12/18 (!) 137/91   05/01/18 109/73   04/02/18 119/85     Pulse Readings from Last 3 Encounters:   06/12/18 74   05/01/18 67   04/02/18 62       Anesthesia Evaluation    I have reviewed the Patient Summary Reports.     I have reviewed the Medications.     Review of Systems  Anesthesia Hx:  Personal Hx of Anesthesia complications, Post-Operative Nausea/Vomiting (tx'd with zofran), with every anesthetic, treatment not known   Hematology/Oncology:        Current/Recent Cancer. Oncology Comments: Carcinoid, metastatic   Renal/:   renal calculi    Hepatic/GI:   Liver Disease,      Lab Results   Component Value Date    WBC 4.37 06/18/2018    HGB 13.1 (L)  06/18/2018    HCT 40.6 06/18/2018    MCV 89 06/18/2018     06/18/2018       Chemistry        Component Value Date/Time     06/18/2018 0743    K 4.1 06/18/2018 0743     06/18/2018 0743    CO2 23 06/18/2018 0743    BUN 18 06/18/2018 0743    CREATININE 1.2 06/18/2018 0743     (H) 06/18/2018 0743        Component Value Date/Time    CALCIUM 9.5 06/18/2018 0743    ALKPHOS 61 06/18/2018 0743    AST 19 06/18/2018 0743    AST 30 12/10/2015 2033    ALT 31 06/18/2018 0743    BILITOT 0.5 06/18/2018 0743    ESTGFRAFRICA >60 06/18/2018 0743    EGFRNONAA >60 06/18/2018 0743            Physical Exam  General:  Well nourished    Airway/Jaw/Neck:  Airway Findings: Mouth Opening: Normal Tongue: Normal  Mallampati: III  TM Distance: 4 - 6 cm  Jaw/Neck Findings:  Neck ROM: Normal ROM      Dental:  Dental Findings: In tact   Chest/Lungs:  Chest/Lungs Clear    Heart/Vascular:  Heart Findings: Normal       Mental Status:  Mental Status Findings:  Alert and Oriented       CONCLUSIONS     1 - Normal left ventricular systolic function (EF 55-60%).     2 - Impaired LV relaxation, normal LAP (grade 1 diastolic dysfunction).     3 - Normal right ventricular systolic function .     4 - The estimated PA systolic pressure is 19 mmHg.     This document has been electronically    SIGNED BY: Hong Dave MD On: 09/01/2017 07:54    EKG:  Vent. Rate : 054 BPM     Atrial Rate : 054 BPM     P-R Int : 190 ms          QRS Dur : 100 ms      QT Int : 408 ms       P-R-T Axes : 065 058 050 degrees     QTc Int : 386 ms    Sinus bradycardia  Otherwise normal ECG  No previous ECGs available  Confirmed by Loren Abdi MD ([id number]) on 6/14/2018 10:39:08 AM    Referred By: AMADA MARTIN           Confirmed By:Loren Abdi MD      Anesthesia Plan  Type of Anesthesia, risks & benefits discussed:  Anesthesia Type:  general  Patient's Preference:   Intra-op Monitoring Plan: central line and arterial line  Intra-op Monitoring Plan  Comments:   Post Op Pain Control Plan:   Post Op Pain Control Plan Comments:   Induction:    Beta Blocker:  Patient is not currently on a Beta-Blocker (No further documentation required).       Informed Consent: Patient understands risks and agrees with Anesthesia plan.  Questions answered. Anesthesia consent signed with patient.  ASA Score: 3     Day of Surgery Review of History & Physical: I have interviewed and examined the patient. I have reviewed the patient's H&P dated:  There are no significant changes.  H&P update referred to the provider.  H&P completed by Anesthesiologist.       Ready For Surgery From Anesthesia Perspective.

## 2018-06-12 NOTE — DISCHARGE INSTRUCTIONS
Your surgery is scheduled for 6/18/2018    Please report to Outpatient Surgery Intake Office on the 2nd FLOOR at 0700 a.m.          INSTRUCTIONS IMPORTANT!!!  ¨ Do not eat or drink after 12 midnight-including water. OK to brush teeth, no   gum, candy or mints!    ¨ Take only these medicines with a small swallow of water-morning of surgery.        ____  Proceed to [facility] on *** for additional blood test.    ____  Prep instructions: ENEMA   SHOWER   OTHER    ____  Do not wear makeup, including mascara.  ____  No powder, lotions or creams to surgical area.  ____  Please remove all jewelry, including piercings and leave at home.  ____  No money or valuables needed. Please leave at home.  ____  Please bring any documents given by your doctor.  ____  If going home the same day, arrange for a ride home. You will not be able to   drive if Anesthesia was used.  ____  Children under 18 years require a parent / guardian present the entire time   they are in surgery / recovery.  ____  Wear loose fitting clothing. Allow for dressings, bandages.  ____  Stop Aspirin, Ibuprofen, Motrin and Aleve at least 3-5 days before                       surgery, unless otherwise instructed by your doctor, or the nurse.   You MAY use Tylenol/acetaminophen until day of surgery.  ____  Wash the surgical area with Hibiclens the night before surgery, and again the             morning of surgery.  Be sure to rinse hibiclens off completely (if instructed by              nurse).  ____  If you take diabetic medication, do not take am of surgery unless instructed by   Doctor.  ____  Call MD for temperature above 101 degrees.        ____ Stop taking any Fish Oil supplement or any Vitamins that contain Vitamin              E at least 5 days prior to surgery.          I have read or had read and explained to me, and understand the above information.  Additional comments or instructions  Pre-Op Bathing Instructions    Before surgery, you  can play an important role in your own health.    Because skin is not sterile, we need to be sure that your skin is as free of germs as possible. By following the instructions below, you can reduce the number of germs on your skin before surgery.    IMPORTANT: You will need to shower with a special soap called Hibiclens*, available at any pharmacy, over the counter usually in the first aid isle.  If you are allergic to Chlorhexidine (the antiseptic in Hibiclens), use an antibacterial soap such as Dial Soap for your preoperative showers.  You will shower with Hibiclens the night before and the morning of surgery. Both the night before your surgery and the morning of your surgery see steps 2 and 3.   Do not use Hibiclens on the head, face or genitals to avoid injury to those areas.    STEP #1  1.  Shower with Hibiclens solution night before and the morning of surgery.      STEP #2: THE NIGHT BEFORE YOUR SURGERY     1. Do not shave the area of your body where your surgery will be performed.  2. Wash your hair as usual with your normal  Shampoo. Wash body shoulder to toes with your normal soap.  3. Squeeze Hibiclens into hand apply to surgical site.   4. Wash the site gently for five (5) minutes. Do not scrub your skin too hard.   5. Do not wash with your regular soap after Hibiclens is used.  6. Rinse your body thoroughly.  7. Pat yourself dry with a clean, soft towel.  8. Do not use lotion, cream, or powder.  9. Wear clean clothes.    STEP #3: THE MORNING OF YOUR SURGERY     1. Repeat Step #2.    * Not to be used by people allergic to Chlorhexidine.

## 2018-06-18 ENCOUNTER — ANESTHESIA (OUTPATIENT)
Dept: SURGERY | Facility: HOSPITAL | Age: 53
DRG: 406 | End: 2018-06-18
Payer: COMMERCIAL

## 2018-06-18 ENCOUNTER — HOSPITAL ENCOUNTER (INPATIENT)
Facility: HOSPITAL | Age: 53
LOS: 5 days | Discharge: HOME | DRG: 406 | End: 2018-06-23
Attending: SURGERY | Admitting: SURGERY
Payer: COMMERCIAL

## 2018-06-18 DIAGNOSIS — C7B.8 SECONDARY NEUROENDOCRINE TUMOR OF LIVER: Primary | ICD-10-CM

## 2018-06-18 DIAGNOSIS — D3A.00 CARCINOID TUMOR: ICD-10-CM

## 2018-06-18 PROBLEM — K81.1 CHRONIC CHOLECYSTITIS: Status: ACTIVE | Noted: 2018-06-18

## 2018-06-18 LAB
ABO + RH BLD: NORMAL
ALBUMIN SERPL BCP-MCNC: 4 G/DL
ALP SERPL-CCNC: 61 U/L
ALT SERPL W/O P-5'-P-CCNC: 31 U/L
ANION GAP SERPL CALC-SCNC: 8 MMOL/L
ANION GAP SERPL CALC-SCNC: 9 MMOL/L
APTT BLDCRRT: 24.2 SEC
AST SERPL-CCNC: 19 U/L
BASOPHILS # BLD AUTO: 0.01 K/UL
BASOPHILS # BLD AUTO: 0.02 K/UL
BASOPHILS NFR BLD: 0.1 %
BASOPHILS NFR BLD: 0.5 %
BILIRUB SERPL-MCNC: 0.5 MG/DL
BLD GP AB SCN CELLS X3 SERPL QL: NORMAL
BUN SERPL-MCNC: 16 MG/DL
BUN SERPL-MCNC: 18 MG/DL
CALCIUM SERPL-MCNC: 7.9 MG/DL
CALCIUM SERPL-MCNC: 9.5 MG/DL
CHLORIDE SERPL-SCNC: 107 MMOL/L
CHLORIDE SERPL-SCNC: 108 MMOL/L
CO2 SERPL-SCNC: 22 MMOL/L
CO2 SERPL-SCNC: 23 MMOL/L
CREAT SERPL-MCNC: 1.1 MG/DL
CREAT SERPL-MCNC: 1.2 MG/DL
DIFFERENTIAL METHOD: ABNORMAL
DIFFERENTIAL METHOD: ABNORMAL
EOSINOPHIL # BLD AUTO: 0 K/UL
EOSINOPHIL # BLD AUTO: 0.1 K/UL
EOSINOPHIL NFR BLD: 0 %
EOSINOPHIL NFR BLD: 1.1 %
ERYTHROCYTE [DISTWIDTH] IN BLOOD BY AUTOMATED COUNT: 13.1 %
ERYTHROCYTE [DISTWIDTH] IN BLOOD BY AUTOMATED COUNT: 13.1 %
EST. GFR  (AFRICAN AMERICAN): >60 ML/MIN/1.73 M^2
EST. GFR  (AFRICAN AMERICAN): >60 ML/MIN/1.73 M^2
EST. GFR  (NON AFRICAN AMERICAN): >60 ML/MIN/1.73 M^2
EST. GFR  (NON AFRICAN AMERICAN): >60 ML/MIN/1.73 M^2
GLUCOSE SERPL-MCNC: 131 MG/DL (ref 70–110)
GLUCOSE SERPL-MCNC: 132 MG/DL
GLUCOSE SERPL-MCNC: 181 MG/DL
HCO3 UR-SCNC: 22.8 MMOL/L (ref 24–28)
HCT VFR BLD AUTO: 37.3 %
HCT VFR BLD AUTO: 40.6 %
HCT VFR BLD CALC: 36 %PCV (ref 36–54)
HGB BLD-MCNC: 12 G/DL
HGB BLD-MCNC: 12.4 G/DL
HGB BLD-MCNC: 13.1 G/DL
INR PPP: 0.9
LYMPHOCYTES # BLD AUTO: 1.3 K/UL
LYMPHOCYTES # BLD AUTO: 1.3 K/UL
LYMPHOCYTES NFR BLD: 10 %
LYMPHOCYTES NFR BLD: 28.6 %
MAGNESIUM SERPL-MCNC: 1.6 MG/DL
MCH RBC QN AUTO: 28.5 PG
MCH RBC QN AUTO: 29.5 PG
MCHC RBC AUTO-ENTMCNC: 32.3 G/DL
MCHC RBC AUTO-ENTMCNC: 33.2 G/DL
MCV RBC AUTO: 89 FL
MCV RBC AUTO: 89 FL
MONOCYTES # BLD AUTO: 0.3 K/UL
MONOCYTES # BLD AUTO: 0.6 K/UL
MONOCYTES NFR BLD: 5 %
MONOCYTES NFR BLD: 6.4 %
NEUTROPHILS # BLD AUTO: 10.8 K/UL
NEUTROPHILS # BLD AUTO: 2.8 K/UL
NEUTROPHILS NFR BLD: 63.2 %
NEUTROPHILS NFR BLD: 84.6 %
PCO2 BLDA: 36.4 MMHG (ref 35–45)
PH SMN: 7.4 [PH] (ref 7.35–7.45)
PHOSPHATE SERPL-MCNC: 3.1 MG/DL
PLATELET # BLD AUTO: 160 K/UL
PLATELET # BLD AUTO: 174 K/UL
PMV BLD AUTO: 10.2 FL
PMV BLD AUTO: 9.8 FL
PO2 BLDA: 115 MMHG (ref 80–100)
POC BE: -2 MMOL/L
POC IONIZED CALCIUM: 1.14 MMOL/L (ref 1.06–1.42)
POC SATURATED O2: 99 % (ref 95–100)
POC TCO2: 24 MMOL/L (ref 23–27)
POTASSIUM BLD-SCNC: 4.3 MMOL/L (ref 3.5–5.1)
POTASSIUM SERPL-SCNC: 4.1 MMOL/L
POTASSIUM SERPL-SCNC: 4.2 MMOL/L
PROT SERPL-MCNC: 7.5 G/DL
PROTHROMBIN TIME: 10 SEC
RBC # BLD AUTO: 4.2 M/UL
RBC # BLD AUTO: 4.59 M/UL
SAMPLE: ABNORMAL
SODIUM BLD-SCNC: 141 MMOL/L (ref 136–145)
SODIUM SERPL-SCNC: 138 MMOL/L
SODIUM SERPL-SCNC: 139 MMOL/L
WBC # BLD AUTO: 12.76 K/UL
WBC # BLD AUTO: 4.37 K/UL

## 2018-06-18 PROCEDURE — 37000009 HC ANESTHESIA EA ADD 15 MINS: Performed by: SURGERY

## 2018-06-18 PROCEDURE — 27800505 HC CATH, RADIAL ARTERY KIT: Performed by: NURSE ANESTHETIST, CERTIFIED REGISTERED

## 2018-06-18 PROCEDURE — 88307 TISSUE EXAM BY PATHOLOGIST: CPT | Performed by: PATHOLOGY

## 2018-06-18 PROCEDURE — C9290 INJ, BUPIVACAINE LIPOSOME: HCPCS | Performed by: SURGERY

## 2018-06-18 PROCEDURE — 37000008 HC ANESTHESIA 1ST 15 MINUTES: Performed by: SURGERY

## 2018-06-18 PROCEDURE — 27200676 HC TRANSDUCER MONITOR KIT DOUBLE: Performed by: NURSE ANESTHETIST, CERTIFIED REGISTERED

## 2018-06-18 PROCEDURE — 63600175 PHARM REV CODE 636 W HCPCS: Performed by: SURGERY

## 2018-06-18 PROCEDURE — C1751 CATH, INF, PER/CENT/MIDLINE: HCPCS | Performed by: NURSE ANESTHETIST, CERTIFIED REGISTERED

## 2018-06-18 PROCEDURE — 63600175 PHARM REV CODE 636 W HCPCS: Performed by: NURSE ANESTHETIST, CERTIFIED REGISTERED

## 2018-06-18 PROCEDURE — 88360 TUMOR IMMUNOHISTOCHEM/MANUAL: CPT | Mod: 26,,, | Performed by: PATHOLOGY

## 2018-06-18 PROCEDURE — 36415 COLL VENOUS BLD VENIPUNCTURE: CPT

## 2018-06-18 PROCEDURE — 0FB20ZZ EXCISION OF LEFT LOBE LIVER, OPEN APPROACH: ICD-10-PCS | Performed by: SURGERY

## 2018-06-18 PROCEDURE — 25000003 PHARM REV CODE 250: Performed by: STUDENT IN AN ORGANIZED HEALTH CARE EDUCATION/TRAINING PROGRAM

## 2018-06-18 PROCEDURE — 83735 ASSAY OF MAGNESIUM: CPT

## 2018-06-18 PROCEDURE — 88304 TISSUE EXAM BY PATHOLOGIST: CPT | Mod: 26,,, | Performed by: PATHOLOGY

## 2018-06-18 PROCEDURE — 0FB10ZZ EXCISION OF RIGHT LOBE LIVER, OPEN APPROACH: ICD-10-PCS | Performed by: SURGERY

## 2018-06-18 PROCEDURE — 25000003 PHARM REV CODE 250: Performed by: NURSE ANESTHETIST, CERTIFIED REGISTERED

## 2018-06-18 PROCEDURE — C1781 MESH (IMPLANTABLE): HCPCS | Performed by: SURGERY

## 2018-06-18 PROCEDURE — 27100025 HC TUBING, SET FLUID WARMER: Performed by: NURSE ANESTHETIST, CERTIFIED REGISTERED

## 2018-06-18 PROCEDURE — 0FT40ZZ RESECTION OF GALLBLADDER, OPEN APPROACH: ICD-10-PCS | Performed by: SURGERY

## 2018-06-18 PROCEDURE — 27000221 HC OXYGEN, UP TO 24 HOURS

## 2018-06-18 PROCEDURE — 0WUF0JZ SUPPLEMENT ABDOMINAL WALL WITH SYNTHETIC SUBSTITUTE, OPEN APPROACH: ICD-10-PCS | Performed by: SURGERY

## 2018-06-18 PROCEDURE — S0028 INJECTION, FAMOTIDINE, 20 MG: HCPCS | Performed by: STUDENT IN AN ORGANIZED HEALTH CARE EDUCATION/TRAINING PROGRAM

## 2018-06-18 PROCEDURE — 27201423 OPTIME MED/SURG SUP & DEVICES STERILE SUPPLY: Performed by: SURGERY

## 2018-06-18 PROCEDURE — S0030 INJECTION, METRONIDAZOLE: HCPCS | Performed by: SURGERY

## 2018-06-18 PROCEDURE — 94761 N-INVAS EAR/PLS OXIMETRY MLT: CPT

## 2018-06-18 PROCEDURE — 85730 THROMBOPLASTIN TIME PARTIAL: CPT

## 2018-06-18 PROCEDURE — 25000003 PHARM REV CODE 250: Performed by: SURGERY

## 2018-06-18 PROCEDURE — 63600175 PHARM REV CODE 636 W HCPCS: Performed by: STUDENT IN AN ORGANIZED HEALTH CARE EDUCATION/TRAINING PROGRAM

## 2018-06-18 PROCEDURE — 25000003 PHARM REV CODE 250: Performed by: ANESTHESIOLOGY

## 2018-06-18 PROCEDURE — 88307 TISSUE EXAM BY PATHOLOGIST: CPT | Mod: 26,,, | Performed by: PATHOLOGY

## 2018-06-18 PROCEDURE — P9045 ALBUMIN (HUMAN), 5%, 250 ML: HCPCS | Mod: JG | Performed by: NURSE ANESTHETIST, CERTIFIED REGISTERED

## 2018-06-18 PROCEDURE — 20000000 HC ICU ROOM

## 2018-06-18 PROCEDURE — 80048 BASIC METABOLIC PNL TOTAL CA: CPT

## 2018-06-18 PROCEDURE — 86901 BLOOD TYPING SEROLOGIC RH(D): CPT

## 2018-06-18 PROCEDURE — 36000708 HC OR TIME LEV III 1ST 15 MIN: Performed by: SURGERY

## 2018-06-18 PROCEDURE — 84100 ASSAY OF PHOSPHORUS: CPT

## 2018-06-18 PROCEDURE — 85610 PROTHROMBIN TIME: CPT

## 2018-06-18 PROCEDURE — 80053 COMPREHEN METABOLIC PANEL: CPT

## 2018-06-18 PROCEDURE — 36000709 HC OR TIME LEV III EA ADD 15 MIN: Performed by: SURGERY

## 2018-06-18 PROCEDURE — 85025 COMPLETE CBC W/AUTO DIFF WBC: CPT

## 2018-06-18 RX ORDER — ONDANSETRON 2 MG/ML
4 INJECTION INTRAMUSCULAR; INTRAVENOUS EVERY 6 HOURS PRN
Status: DISCONTINUED | OUTPATIENT
Start: 2018-06-18 | End: 2018-06-23 | Stop reason: HOSPADM

## 2018-06-18 RX ORDER — HYDROMORPHONE HCL IN 0.9% NACL 6 MG/30 ML
PATIENT CONTROLLED ANALGESIA SYRINGE INTRAVENOUS CONTINUOUS
Status: DISCONTINUED | OUTPATIENT
Start: 2018-06-18 | End: 2018-06-20

## 2018-06-18 RX ORDER — CIPROFLOXACIN 2 MG/ML
400 INJECTION, SOLUTION INTRAVENOUS
Status: COMPLETED | OUTPATIENT
Start: 2018-06-18 | End: 2018-06-19

## 2018-06-18 RX ORDER — FENTANYL CITRATE 50 UG/ML
INJECTION, SOLUTION INTRAMUSCULAR; INTRAVENOUS
Status: DISCONTINUED | OUTPATIENT
Start: 2018-06-18 | End: 2018-06-18

## 2018-06-18 RX ORDER — PHENYLEPHRINE HYDROCHLORIDE 10 MG/ML
INJECTION INTRAVENOUS
Status: DISCONTINUED | OUTPATIENT
Start: 2018-06-18 | End: 2018-06-18

## 2018-06-18 RX ORDER — METRONIDAZOLE 500 MG/100ML
500 INJECTION, SOLUTION INTRAVENOUS
Status: DISCONTINUED | OUTPATIENT
Start: 2018-06-18 | End: 2018-06-18

## 2018-06-18 RX ORDER — MIDAZOLAM HYDROCHLORIDE 1 MG/ML
INJECTION INTRAMUSCULAR; INTRAVENOUS
Status: DISCONTINUED | OUTPATIENT
Start: 2018-06-18 | End: 2018-06-18

## 2018-06-18 RX ORDER — DEXTROSE MONOHYDRATE, SODIUM CHLORIDE, AND POTASSIUM CHLORIDE 50; 1.49; 4.5 G/1000ML; G/1000ML; G/1000ML
INJECTION, SOLUTION INTRAVENOUS CONTINUOUS
Status: DISCONTINUED | OUTPATIENT
Start: 2018-06-18 | End: 2018-06-20

## 2018-06-18 RX ORDER — ONDANSETRON 2 MG/ML
4 INJECTION INTRAMUSCULAR; INTRAVENOUS DAILY PRN
Status: DISCONTINUED | OUTPATIENT
Start: 2018-06-18 | End: 2018-06-18

## 2018-06-18 RX ORDER — LIDOCAINE HCL/PF 100 MG/5ML
SYRINGE (ML) INTRAVENOUS
Status: DISCONTINUED | OUTPATIENT
Start: 2018-06-18 | End: 2018-06-18

## 2018-06-18 RX ORDER — SODIUM CHLORIDE 0.9 % (FLUSH) 0.9 %
3 SYRINGE (ML) INJECTION EVERY 10 MIN PRN
Status: DISCONTINUED | OUTPATIENT
Start: 2018-06-18 | End: 2018-06-18

## 2018-06-18 RX ORDER — FAMOTIDINE 10 MG/ML
20 INJECTION INTRAVENOUS
Status: COMPLETED | OUTPATIENT
Start: 2018-06-18 | End: 2018-06-18

## 2018-06-18 RX ORDER — SUCCINYLCHOLINE CHLORIDE 20 MG/ML
INJECTION INTRAMUSCULAR; INTRAVENOUS
Status: DISCONTINUED | OUTPATIENT
Start: 2018-06-18 | End: 2018-06-18

## 2018-06-18 RX ORDER — PROPOFOL 10 MG/ML
VIAL (ML) INTRAVENOUS
Status: DISCONTINUED | OUTPATIENT
Start: 2018-06-18 | End: 2018-06-18

## 2018-06-18 RX ORDER — BUPIVACAINE HYDROCHLORIDE 2.5 MG/ML
INJECTION, SOLUTION EPIDURAL; INFILTRATION; INTRACAUDAL
Status: DISCONTINUED | OUTPATIENT
Start: 2018-06-18 | End: 2018-06-18

## 2018-06-18 RX ORDER — LIDOCAINE HYDROCHLORIDE 10 MG/ML
1 INJECTION, SOLUTION EPIDURAL; INFILTRATION; INTRACAUDAL; PERINEURAL ONCE
Status: DISCONTINUED | OUTPATIENT
Start: 2018-06-18 | End: 2018-06-18

## 2018-06-18 RX ORDER — MORPHINE SULFATE 4 MG/ML
2 INJECTION, SOLUTION INTRAMUSCULAR; INTRAVENOUS
Status: DISCONTINUED | OUTPATIENT
Start: 2018-06-18 | End: 2018-06-18

## 2018-06-18 RX ORDER — ONDANSETRON HYDROCHLORIDE 2 MG/ML
INJECTION, SOLUTION INTRAMUSCULAR; INTRAVENOUS
Status: DISCONTINUED | OUTPATIENT
Start: 2018-06-18 | End: 2018-06-18

## 2018-06-18 RX ORDER — DEXAMETHASONE SODIUM PHOSPHATE 4 MG/ML
INJECTION, SOLUTION INTRA-ARTICULAR; INTRALESIONAL; INTRAMUSCULAR; INTRAVENOUS; SOFT TISSUE
Status: DISCONTINUED | OUTPATIENT
Start: 2018-06-18 | End: 2018-06-18

## 2018-06-18 RX ORDER — ENOXAPARIN SODIUM 100 MG/ML
30 INJECTION SUBCUTANEOUS
Status: COMPLETED | OUTPATIENT
Start: 2018-06-18 | End: 2018-06-18

## 2018-06-18 RX ORDER — NALOXONE HCL 0.4 MG/ML
0.02 VIAL (ML) INJECTION
Status: DISCONTINUED | OUTPATIENT
Start: 2018-06-18 | End: 2018-06-23 | Stop reason: HOSPADM

## 2018-06-18 RX ORDER — DIPHENHYDRAMINE HYDROCHLORIDE 50 MG/ML
12.5 INJECTION INTRAMUSCULAR; INTRAVENOUS EVERY 4 HOURS PRN
Status: DISCONTINUED | OUTPATIENT
Start: 2018-06-18 | End: 2018-06-23 | Stop reason: HOSPADM

## 2018-06-18 RX ORDER — ACETAMINOPHEN 10 MG/ML
1000 INJECTION, SOLUTION INTRAVENOUS EVERY 8 HOURS
Status: COMPLETED | OUTPATIENT
Start: 2018-06-18 | End: 2018-06-20

## 2018-06-18 RX ORDER — OXYCODONE HYDROCHLORIDE 5 MG/1
5 TABLET ORAL
Status: DISCONTINUED | OUTPATIENT
Start: 2018-06-18 | End: 2018-06-18

## 2018-06-18 RX ORDER — CIPROFLOXACIN 2 MG/ML
400 INJECTION, SOLUTION INTRAVENOUS ONCE
Status: DISCONTINUED | OUTPATIENT
Start: 2018-06-18 | End: 2018-06-18

## 2018-06-18 RX ORDER — GLYCOPYRROLATE 0.2 MG/ML
INJECTION INTRAMUSCULAR; INTRAVENOUS
Status: DISCONTINUED | OUTPATIENT
Start: 2018-06-18 | End: 2018-06-18

## 2018-06-18 RX ORDER — HYDROMORPHONE HYDROCHLORIDE 2 MG/ML
0.5 INJECTION, SOLUTION INTRAMUSCULAR; INTRAVENOUS; SUBCUTANEOUS ONCE
Status: DISCONTINUED | OUTPATIENT
Start: 2018-06-18 | End: 2018-06-23 | Stop reason: HOSPADM

## 2018-06-18 RX ORDER — PREGABALIN 75 MG/1
75 CAPSULE ORAL 2 TIMES DAILY
Status: DISCONTINUED | OUTPATIENT
Start: 2018-06-18 | End: 2018-06-23 | Stop reason: HOSPADM

## 2018-06-18 RX ORDER — HYDROMORPHONE HCL 2 MG/ML
VIAL (ML) INJECTION
Status: DISCONTINUED | OUTPATIENT
Start: 2018-06-18 | End: 2018-06-18

## 2018-06-18 RX ORDER — KETOROLAC TROMETHAMINE 30 MG/ML
15 INJECTION, SOLUTION INTRAMUSCULAR; INTRAVENOUS
Status: DISCONTINUED | OUTPATIENT
Start: 2018-06-18 | End: 2018-06-18

## 2018-06-18 RX ORDER — BACITRACIN 50000 [IU]/1
INJECTION, POWDER, FOR SOLUTION INTRAMUSCULAR
Status: DISCONTINUED | OUTPATIENT
Start: 2018-06-18 | End: 2018-06-18

## 2018-06-18 RX ORDER — HYDRALAZINE HYDROCHLORIDE 20 MG/ML
5 INJECTION INTRAMUSCULAR; INTRAVENOUS
Status: DISCONTINUED | OUTPATIENT
Start: 2018-06-18 | End: 2018-06-23 | Stop reason: HOSPADM

## 2018-06-18 RX ORDER — NEOSTIGMINE METHYLSULFATE 1 MG/ML
INJECTION, SOLUTION INTRAVENOUS
Status: DISCONTINUED | OUTPATIENT
Start: 2018-06-18 | End: 2018-06-18

## 2018-06-18 RX ORDER — ALBUTEROL SULFATE 2.5 MG/.5ML
2.5 SOLUTION RESPIRATORY (INHALATION) EVERY 4 HOURS PRN
Status: DISCONTINUED | OUTPATIENT
Start: 2018-06-18 | End: 2018-06-23 | Stop reason: HOSPADM

## 2018-06-18 RX ORDER — MAGNESIUM SULFATE HEPTAHYDRATE 40 MG/ML
2 INJECTION, SOLUTION INTRAVENOUS ONCE
Status: COMPLETED | OUTPATIENT
Start: 2018-06-18 | End: 2018-06-18

## 2018-06-18 RX ORDER — PREGABALIN 75 MG/1
75 CAPSULE ORAL
Status: DISCONTINUED | OUTPATIENT
Start: 2018-06-18 | End: 2018-06-18

## 2018-06-18 RX ORDER — DIPHENHYDRAMINE HYDROCHLORIDE 50 MG/ML
25 INJECTION INTRAMUSCULAR; INTRAVENOUS EVERY 6 HOURS PRN
Status: DISCONTINUED | OUTPATIENT
Start: 2018-06-18 | End: 2018-06-18

## 2018-06-18 RX ORDER — VECURONIUM BROMIDE FOR INJECTION 1 MG/ML
INJECTION, POWDER, LYOPHILIZED, FOR SOLUTION INTRAVENOUS
Status: DISCONTINUED | OUTPATIENT
Start: 2018-06-18 | End: 2018-06-18

## 2018-06-18 RX ORDER — LABETALOL HYDROCHLORIDE 5 MG/ML
5 INJECTION, SOLUTION INTRAVENOUS
Status: DISCONTINUED | OUTPATIENT
Start: 2018-06-18 | End: 2018-06-23 | Stop reason: HOSPADM

## 2018-06-18 RX ORDER — ACETAMINOPHEN 10 MG/ML
1000 INJECTION, SOLUTION INTRAVENOUS EVERY 8 HOURS
Status: DISCONTINUED | OUTPATIENT
Start: 2018-06-18 | End: 2018-06-18

## 2018-06-18 RX ORDER — ALBUMIN HUMAN 50 G/1000ML
SOLUTION INTRAVENOUS CONTINUOUS PRN
Status: DISCONTINUED | OUTPATIENT
Start: 2018-06-18 | End: 2018-06-18

## 2018-06-18 RX ORDER — SODIUM CHLORIDE 9 MG/ML
INJECTION, SOLUTION INTRAVENOUS CONTINUOUS PRN
Status: DISCONTINUED | OUTPATIENT
Start: 2018-06-18 | End: 2018-06-18

## 2018-06-18 RX ORDER — METRONIDAZOLE 500 MG/100ML
500 INJECTION, SOLUTION INTRAVENOUS
Status: COMPLETED | OUTPATIENT
Start: 2018-06-18 | End: 2018-06-19

## 2018-06-18 RX ORDER — ONDANSETRON 2 MG/ML
8 INJECTION INTRAMUSCULAR; INTRAVENOUS
Status: COMPLETED | OUTPATIENT
Start: 2018-06-18 | End: 2018-06-18

## 2018-06-18 RX ORDER — SODIUM CHLORIDE 0.9 % (FLUSH) 0.9 %
3 SYRINGE (ML) INJECTION
Status: DISCONTINUED | OUTPATIENT
Start: 2018-06-18 | End: 2018-06-18

## 2018-06-18 RX ORDER — SODIUM CHLORIDE, SODIUM LACTATE, POTASSIUM CHLORIDE, CALCIUM CHLORIDE 600; 310; 30; 20 MG/100ML; MG/100ML; MG/100ML; MG/100ML
INJECTION, SOLUTION INTRAVENOUS CONTINUOUS
Status: DISCONTINUED | OUTPATIENT
Start: 2018-06-18 | End: 2018-06-18

## 2018-06-18 RX ORDER — SODIUM CHLORIDE, SODIUM LACTATE, POTASSIUM CHLORIDE, CALCIUM CHLORIDE 600; 310; 30; 20 MG/100ML; MG/100ML; MG/100ML; MG/100ML
INJECTION, SOLUTION INTRAVENOUS CONTINUOUS PRN
Status: DISCONTINUED | OUTPATIENT
Start: 2018-06-18 | End: 2018-06-18

## 2018-06-18 RX ORDER — CIPROFLOXACIN 2 MG/ML
INJECTION, SOLUTION INTRAVENOUS
Status: DISCONTINUED | OUTPATIENT
Start: 2018-06-18 | End: 2018-06-18

## 2018-06-18 RX ORDER — KETOROLAC TROMETHAMINE 30 MG/ML
10 INJECTION, SOLUTION INTRAMUSCULAR; INTRAVENOUS EVERY 6 HOURS
Status: DISCONTINUED | OUTPATIENT
Start: 2018-06-18 | End: 2018-06-20

## 2018-06-18 RX ADMIN — HYDROMORPHONE HYDROCHLORIDE 0.4 MG: 2 INJECTION INTRAMUSCULAR; INTRAVENOUS; SUBCUTANEOUS at 01:06

## 2018-06-18 RX ADMIN — KETOROLAC TROMETHAMINE 15 MG: 30 INJECTION, SOLUTION INTRAMUSCULAR at 09:06

## 2018-06-18 RX ADMIN — SODIUM CHLORIDE, SODIUM LACTATE, POTASSIUM CHLORIDE, AND CALCIUM CHLORIDE: .6; .31; .03; .02 INJECTION, SOLUTION INTRAVENOUS at 02:06

## 2018-06-18 RX ADMIN — MAGNESIUM SULFATE IN WATER 2 G: 40 INJECTION, SOLUTION INTRAVENOUS at 04:06

## 2018-06-18 RX ADMIN — LIDOCAINE HYDROCHLORIDE 100 MG: 20 INJECTION, SOLUTION INTRAVENOUS at 10:06

## 2018-06-18 RX ADMIN — ONDANSETRON 4 MG: 2 INJECTION, SOLUTION INTRAMUSCULAR; INTRAVENOUS at 12:06

## 2018-06-18 RX ADMIN — VECURONIUM BROMIDE FOR INJECTION 6 MG: 1 INJECTION, POWDER, LYOPHILIZED, FOR SOLUTION INTRAVENOUS at 10:06

## 2018-06-18 RX ADMIN — ACETAMINOPHEN 1000 MG: 10 INJECTION, SOLUTION INTRAVENOUS at 12:06

## 2018-06-18 RX ADMIN — VECURONIUM BROMIDE FOR INJECTION 5 MG: 1 INJECTION, POWDER, LYOPHILIZED, FOR SOLUTION INTRAVENOUS at 11:06

## 2018-06-18 RX ADMIN — ONDANSETRON 8 MG: 2 INJECTION INTRAMUSCULAR; INTRAVENOUS at 08:06

## 2018-06-18 RX ADMIN — HYDROMORPHONE HYDROCHLORIDE 0.2 MG: 2 INJECTION INTRAMUSCULAR; INTRAVENOUS; SUBCUTANEOUS at 02:06

## 2018-06-18 RX ADMIN — OCTREOTIDE ACETATE 125 MCG/HR: 1000 INJECTION, SOLUTION INTRAVENOUS; SUBCUTANEOUS at 04:06

## 2018-06-18 RX ADMIN — SODIUM CHLORIDE, SODIUM LACTATE, POTASSIUM CHLORIDE, AND CALCIUM CHLORIDE: .6; .31; .03; .02 INJECTION, SOLUTION INTRAVENOUS at 09:06

## 2018-06-18 RX ADMIN — ACETAMINOPHEN 1000 MG: 10 INJECTION, SOLUTION INTRAVENOUS at 09:06

## 2018-06-18 RX ADMIN — CIPROFLOXACIN 200 MG: 2 INJECTION, SOLUTION INTRAVENOUS at 01:06

## 2018-06-18 RX ADMIN — GLYCOPYRROLATE 0.2 MG: 0.2 INJECTION, SOLUTION INTRAMUSCULAR; INTRAVENOUS at 11:06

## 2018-06-18 RX ADMIN — METRONIDAZOLE 250 MG: 500 SOLUTION INTRAVENOUS at 01:06

## 2018-06-18 RX ADMIN — FENTANYL CITRATE 100 MCG: 50 INJECTION, SOLUTION INTRAMUSCULAR; INTRAVENOUS at 03:06

## 2018-06-18 RX ADMIN — ONDANSETRON 4 MG: 2 INJECTION INTRAMUSCULAR; INTRAVENOUS at 11:06

## 2018-06-18 RX ADMIN — OXYCODONE HYDROCHLORIDE 5 MG: 5 TABLET ORAL at 07:06

## 2018-06-18 RX ADMIN — IRON SUCROSE 100 MG: 20 INJECTION, SOLUTION INTRAVENOUS at 04:06

## 2018-06-18 RX ADMIN — GLYCOPYRROLATE 0.6 MG: 0.2 INJECTION, SOLUTION INTRAMUSCULAR; INTRAVENOUS at 02:06

## 2018-06-18 RX ADMIN — FENTANYL CITRATE 250 MCG: 50 INJECTION, SOLUTION INTRAMUSCULAR; INTRAVENOUS at 10:06

## 2018-06-18 RX ADMIN — NEOSTIGMINE METHYLSULFATE 3 MG: 1 INJECTION INTRAVENOUS at 02:06

## 2018-06-18 RX ADMIN — ALBUMIN (HUMAN): 2.5 SOLUTION INTRAVENOUS at 02:06

## 2018-06-18 RX ADMIN — PHENYLEPHRINE HYDROCHLORIDE 200 MCG: 10 INJECTION INTRAVENOUS at 02:06

## 2018-06-18 RX ADMIN — ENOXAPARIN SODIUM 30 MG: 100 INJECTION SUBCUTANEOUS at 08:06

## 2018-06-18 RX ADMIN — Medication: at 04:06

## 2018-06-18 RX ADMIN — PHENYLEPHRINE HYDROCHLORIDE 100 MCG: 10 INJECTION INTRAVENOUS at 02:06

## 2018-06-18 RX ADMIN — DEXTROSE MONOHYDRATE, SODIUM CHLORIDE, AND POTASSIUM CHLORIDE: 50; 4.5; 1.49 INJECTION, SOLUTION INTRAVENOUS at 03:06

## 2018-06-18 RX ADMIN — FAMOTIDINE 20 MG: 10 INJECTION INTRAVENOUS at 08:06

## 2018-06-18 RX ADMIN — HYDROMORPHONE HYDROCHLORIDE 0.4 MG: 2 INJECTION INTRAMUSCULAR; INTRAVENOUS; SUBCUTANEOUS at 02:06

## 2018-06-18 RX ADMIN — CIPROFLOXACIN 400 MG: 2 INJECTION, SOLUTION INTRAVENOUS at 04:06

## 2018-06-18 RX ADMIN — HYDROCORTISONE SODIUM SUCCINATE 50 MG: 100 INJECTION, POWDER, FOR SOLUTION INTRAMUSCULAR; INTRAVENOUS at 08:06

## 2018-06-18 RX ADMIN — PREGABALIN 75 MG: 75 CAPSULE ORAL at 09:06

## 2018-06-18 RX ADMIN — METRONIDAZOLE 500 MG: 500 INJECTION, SOLUTION INTRAVENOUS at 06:06

## 2018-06-18 RX ADMIN — HYDROMORPHONE HYDROCHLORIDE 0.8 MG: 2 INJECTION INTRAMUSCULAR; INTRAVENOUS; SUBCUTANEOUS at 03:06

## 2018-06-18 RX ADMIN — VECURONIUM BROMIDE FOR INJECTION 2 MG: 1 INJECTION, POWDER, LYOPHILIZED, FOR SOLUTION INTRAVENOUS at 10:06

## 2018-06-18 RX ADMIN — CIPROFLOXACIN 400 MG: 2 INJECTION, SOLUTION INTRAVENOUS at 10:06

## 2018-06-18 RX ADMIN — ALBUMIN (HUMAN): 2.5 SOLUTION INTRAVENOUS at 11:06

## 2018-06-18 RX ADMIN — METRONIDAZOLE 500 MG: 500 SOLUTION INTRAVENOUS at 10:06

## 2018-06-18 RX ADMIN — OCTREOTIDE ACETATE 250 MCG/HR: 1000 INJECTION, SOLUTION INTRAVENOUS; SUBCUTANEOUS at 09:06

## 2018-06-18 RX ADMIN — CALCIUM GLUCONATE 1 G: 98 INJECTION, SOLUTION INTRAVENOUS at 05:06

## 2018-06-18 RX ADMIN — PROPOFOL 150 MG: 10 INJECTION, EMULSION INTRAVENOUS at 10:06

## 2018-06-18 RX ADMIN — SODIUM CHLORIDE: 0.9 INJECTION, SOLUTION INTRAVENOUS at 10:06

## 2018-06-18 RX ADMIN — CALCIUM GLUCONATE 1 G: 98 INJECTION, SOLUTION INTRAVENOUS at 04:06

## 2018-06-18 RX ADMIN — MOXIFLOXACIN HYDROCHLORIDE 400 MG: 400 INJECTION, SOLUTION INTRAVENOUS at 10:06

## 2018-06-18 RX ADMIN — SUCCINYLCHOLINE CHLORIDE 200 MG: 20 INJECTION, SOLUTION INTRAMUSCULAR; INTRAVENOUS at 10:06

## 2018-06-18 RX ADMIN — OCTREOTIDE ACETATE: 500 INJECTION, SOLUTION INTRAVENOUS; SUBCUTANEOUS at 08:06

## 2018-06-18 RX ADMIN — KETOROLAC TROMETHAMINE 10 MG: 30 INJECTION, SOLUTION INTRAMUSCULAR at 05:06

## 2018-06-18 RX ADMIN — ONDANSETRON 4 MG: 2 INJECTION, SOLUTION INTRAMUSCULAR; INTRAVENOUS at 03:06

## 2018-06-18 RX ADMIN — MIDAZOLAM HYDROCHLORIDE 5 MG: 1 INJECTION, SOLUTION INTRAMUSCULAR; INTRAVENOUS at 09:06

## 2018-06-18 RX ADMIN — DEXAMETHASONE SODIUM PHOSPHATE 8 MG: 4 INJECTION, SOLUTION INTRAMUSCULAR; INTRAVENOUS at 12:06

## 2018-06-18 NOTE — ANESTHESIA POSTPROCEDURE EVALUATION
"Anesthesia Post Evaluation    Patient: Omid Falcon    Procedure(s) Performed: Procedure(s) (LRB):  EXPLORATORY-LAPAROTOMY (N/A)  RESECTION-HEPATIC (N/A)  CHOLECYSTECTOMY (N/A)  REPAIR-HERNIA-INCISIONAL-INITIAL with mesh (Bilateral)    Final Anesthesia Type: general  Patient location during evaluation: PACU  Patient participation: Yes- Able to Participate  Level of consciousness: awake and alert, oriented and awake  Post-procedure vital signs: reviewed and stable  Pain management: adequate  Airway patency: patent  PONV status at discharge: No PONV  Anesthetic complications: no      Cardiovascular status: blood pressure returned to baseline  Respiratory status: unassisted and room air  Hydration status: euvolemic  Follow-up not needed.        Visit Vitals  /77   Pulse (!) 57   Temp 36.8 °C (98.2 °F) (Skin)   Resp 20   Ht 5' 11" (1.803 m)   Wt 108 kg (238 lb)   BMI 33.19 kg/m²       Pain/Luis Score: Pain Assessment Performed: Yes (6/18/2018  7:30 AM)  Presence of Pain: denies (6/18/2018  7:30 AM)  Pain Rating Prior to Med Admin: 0 (6/18/2018  9:08 AM)      "

## 2018-06-18 NOTE — PLAN OF CARE
Problem: Patient Care Overview  Goal: Plan of Care Review  Outcome: Ongoing (interventions implemented as appropriate)  No acute events occurred throughout shift. Pt remains on D51/2NS+20KCL, octreotide @ 125mcg/h, and various IVPBs. Incision site clean and free of drainage. Plan of care reviewed with pt and pt's family at 1600. All questions and concerns addressed. See flowsheets for VS and assessment information. Will continue to monitor.

## 2018-06-18 NOTE — ANESTHESIA PROCEDURE NOTES
Central Line    Diagnosis: carcinoid syndrome  Doctor requesting consult: Bryanna  Patient location during procedure: done in OR  Procedure start time: 6/18/2018 9:47 AM  Timeout: 6/18/2018 9:47 AM  Procedure end time: 6/18/2018 10:12 AM  Staffing  Anesthesiologist: LAUREN CUMMINGS  Performed: anesthesiologist   Anesthesiologist was present at the time of the procedure.  Preanesthetic Checklist  Completed: patient identified, site marked, surgical consent, pre-op evaluation, timeout performed, IV checked, risks and benefits discussed, monitors and equipment checked and anesthesia consent given  Indication  Indication: vascular access, med administration, hemodynamic monitoring     Anesthesia   general anesthesia    Central Line  Skin Prep: skin prepped with ChloraPrep, skin prep agent completely dried prior to procedure  maximum sterile barriers used during central venous catheter insertion  hand hygiene performed prior to central venous catheter insertion  Location: right internal jugular,   Catheter type: triple lumen  Catheter Size: 7 Fr  Inserted Catheter Length: 16 cm  Ultrasound: vascular probe with ultrasound  Vessel Caliber: medium, patent  Vascular Doppler:  not done, compressibility normal  Needle advanced into vessel with real time Ultrasound guidance.  Guidewire confirmed in vessel.  Sterile sheath used.  Manometry: none  Insertion Attempts: 1   Securement:line sutured, chlorhexidine patch, sterile dressing applied and blood return through all ports     Post-Procedure  X-Ray: tip termination, successful placement, placement verified by x-ray and no pneumothorax on x-ray  Adverse Events:none

## 2018-06-18 NOTE — TRANSFER OF CARE
"Anesthesia Transfer of Care Note    Patient: Omid Falcon    Procedure(s) Performed: Procedure(s) (LRB):  EXPLORATORY-LAPAROTOMY (N/A)  RESECTION-HEPATIC (N/A)  CHOLECYSTECTOMY (N/A)  REPAIR-HERNIA-INCISIONAL-INITIAL with mesh (Bilateral)    Patient location: ICU    Anesthesia Type: general    Transport from OR: Transported from OR on 6-10 L/min O2 by face mask with adequate spontaneous ventilation. Continuous ECG monitoring in transport. Continuous SpO2 monitoring in transport    Post pain: adequate analgesia    Post assessment: no apparent anesthetic complications    Post vital signs: stable    Level of consciousness: awake and responds to stimulation    Nausea/Vomiting: no nausea/vomiting    Complications: none    Transfer of care protocol was followed      Last vitals:   Visit Vitals  /77   Pulse (!) 57   Temp 36.8 °C (98.2 °F) (Skin)   Resp 20   Ht 5' 11" (1.803 m)   Wt 108 kg (238 lb)   BMI 33.19 kg/m²     "

## 2018-06-18 NOTE — NURSING
Pt arrived to  from OR on 6L o2. Pt lethargic from procedure, beginning to slowly wake up, currently AAOx3. VSS. Labs sent, IVPB's administered, PCA set up. NGT to low continuous suction, output minimal and black. CVP 11. Will continue to monitor.

## 2018-06-18 NOTE — H&P
"Subjective:   Chief Complaint: Follow-up (f/u visit for possible surgery, ref from [facility] )        Vitals:   Vitals       Vitals:     05/01/18 0948   BP: 109/73   Pulse: 67   Temp: 98 °F (36.7 °C)   TempSrc: Oral   Weight: 108.1 kg (238 lb 5.1 oz)   Height: 5' 11" (1.803 m)            Karnofsky Score:   Karnofsky Score    Karnofsky Score:  90% - Able to Carry on Normal Activity: Minor Symptoms of Disease            Diagnosis:   1. Secondary neuroendocrine tumor of liver    2. Carcinoid syndrome    3. Malignant carcinoid tumor of the ileum    4. Secondary neuroendocrine tumor of distant lymph nodes          Oncologic History:     TI carcinoid  R colectomy  2/2016     Recurrence in liver now.  Lanreotide 120 mg Q month     Interval History: referred by Brian Edwards/Delicia.  New tumors in liver. Mild syndrome controlled with meds.  Still has gallbladder.      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 No pertinent family history.     Allergies:        Review of patient's allergies indicates:   Allergen Reactions    Rocephin [ceftriaxone] Hives       Flushing feeling         Medications:  Current Medications          Current Outpatient Prescriptions   Medication Sig Dispense Refill    lanreotide (SOMATULINE DEPOT) 120 mg/0.5 mL Syrg Inject 120 mg into the skin every 28 days.          No current facility-administered medications for this visit.             Review of Systems   Constitutional: Negative.  Negative for appetite change, chills, fatigue, fever and unexpected weight change.   HENT: Negative.  Negative for congestion, " hearing loss and sore throat.    Eyes: Negative.  Negative for pain, discharge and itching.   Respiratory: Negative.  Negative for cough, chest tightness, shortness of breath and wheezing.    Cardiovascular: Negative.  Negative for chest pain, palpitations and leg swelling.   Gastrointestinal: Positive for abdominal distention (hernias) and diarrhea (occaisional). Negative for abdominal pain, blood in stool, constipation, nausea and vomiting.   Endocrine: Negative.  Negative for cold intolerance, heat intolerance and polydipsia.   Genitourinary: Negative.  Negative for difficulty urinating, dysuria and flank pain.   Musculoskeletal: Negative.  Negative for arthralgias, joint swelling and myalgias.   Skin: Negative.  Negative for color change, pallor and rash.   Allergic/Immunologic: Negative.    Neurological: Negative.  Negative for dizziness, syncope and light-headedness.   Hematological: Negative.  Negative for adenopathy. Does not bruise/bleed easily.   Psychiatric/Behavioral: Negative.  Negative for agitation, confusion, dysphoric mood, hallucinations, sleep disturbance and suicidal ideas. The patient is not nervous/anxious.    All other systems reviewed and are negative.  Objective:   Physical Exam   Constitutional: He is oriented to person, place, and time. He appears well-developed and well-nourished. No distress.   HENT:   Head: Normocephalic and atraumatic.   Eyes: Conjunctivae and EOM are normal. Pupils are equal, round, and reactive to light. No scleral icterus.   Neck: Normal range of motion. Neck supple. No JVD present. No tracheal deviation present.   Cardiovascular: Normal rate, regular rhythm, normal heart sounds and intact distal pulses.    Pulmonary/Chest: Effort normal and breath sounds normal. No respiratory distress. He has no wheezes.   Abdominal: Soft. Bowel sounds are normal. He exhibits no distension and no mass. There is no tenderness. There is no rebound and no guarding. A hernia  (incisional , upper abdomen) is present.   Musculoskeletal: Normal range of motion. He exhibits no edema or tenderness.   Neurological: He is alert and oriented to person, place, and time. He has normal reflexes.   Skin: Skin is warm and dry. No rash noted. He is not diaphoretic. No erythema. No pallor.   Psychiatric: He has a normal mood and affect. His behavior is normal. Thought content normal.   Nursing note and vitals reviewed.  Assessment:       1. Secondary neuroendocrine tumor of liver    2. Carcinoid syndrome    3. Malignant carcinoid tumor of the ileum    4. Secondary neuroendocrine tumor of distant lymph nodes         Laboratory Data:  Neuroendocrine Labs Latest Ref Rng & Units 5/1/2018 4/2/2018 2/7/2018 9/22/2017 9/1/2017 8/31/2017 8/5/2017   EXT 5 HIAA BLOOD 10 - 135 pg/ml - - 15 - - - 37   EXT SEROTONIN 56 - 244 ng/ml - - 94 - - - 131   EXT CHROMOGRANIN A 25 - 140 ng/ml - - 20(A) - - - -   EXT PANCREASTATIN KIM 10 - 135 pg/ml - - 108 - - - 64   EXT NEUROKININ A KIM 0 - 40 pg/ml - - 16 - - - <10   WBC 3.90 - 12.70 K/uL - - - - - - -   EXT WBC 3.58 - 10.8 1000/ul - - 5.7 - - - 5.7   HGB 14.0 - 18.0 g/dL - - - - - - -   EXT HGB 13.2 - 17.1 g/dl - - 14.9 - - - 14.7   HCT 40.0 - 54.0 % - - - - - - -   EXT HCT 35.5 - 50.0 % - - 43.0 - - - 43.2   PLATLETS 150 - 350 K/uL - - - - - - -   EXT PLATLETS 140 - 400 1000/ul - - 205 - - - 196   GLUCOSE 70 - 110 mg/dL - - - - - - -   EXT GLUCOSE 65 - 99 mg/dl - - 129(A) - - - 116(A)   BUN 9 - 21 mg/dL - - - - - - -   EXT BUN 7 - 25 mg/dl - - 22 - - - 26(A)   CREATININE 0.5 - 1.4 mg/dL - - - - - 1.5(H) -   EXT CREATININE 0.70 - 1.33 mg/dl - - 1.67(A) - - - 1.18    - 145 mmol/L - - - - - - -   EXT  - 146 mmol/L - - 144 - - - 140   K 3.5 - 5.1 mmol/L - - - - - - -   EXT K 3.5 - 5.3 mmol/l - - 4.3 - - - 4.5   CHLORIDE 95 - 110 mmol/L - - - - - - -   EXT CHLORIDE 98 - 110 mmol/l - - 107 - - - 105   CO2 23 - 29 mmol/L - - - - - - -   EXT CO2 20 - 31 mmol/l - - 28  - - - 27   CALCIUM 8.7 - 10.5 mg/dL - - - - - - -   EXT CALCIUM 8.6 - 10.3 mg/dl - - 9.8 - - - 9.6   PROTEIN, TOTAL 6.0 - 8.4 g/dL - - - - - - -   EXT PROTEIN, TOTAL 6.1 - 8.1 g/dl - - 7.6 - - - 7.6   ALBUMIN 3.5 - 5.2 g/dL - - - - - - -   EXT ALBUMIN 3.6 - 5.1 g/dl - - 4.9 - - - 4.7   TOTAL BILIRUBIN 0.1 - 1.0 mg/dL - - - - - - -   EXT TOTAL BILIRUBIN 0.2 - 1.2 mg/dl - - 0.5 - - - 0.8   ALK PHOSPHATASE 38 - 145 U/L - - - - - - -   EXT ALK PHOSPHATASE 40 - 115 u/l - - 54 - - - 54   EXT SGOT (AST) 10 - 35 u/l - - 19 - - - 22   SGPT (ALT) 10 - 44 U/L - - - - - - -   EXT ALT 9 - 46 u/l - - 20 - - - 24   SERUM AMYLASE 30 - 110 U/L - - - - - - -   Weight - 238 lbs 5 oz 238 lbs 5 oz - 226 lbs 3 oz 226 lbs 10 oz - -   Review here at [facility] [place]  FINAL PATHOLOGIC DIAGNOSIS  MESENTERIC MASS, BIOPSY (REVIEW OF 10 OUTSIDE SLIDES LABELED [id number]):  Well-differentiated neuroendocrine tumor, 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 Dome lesion not amenable to perc ablation due to location    Plan:          Laparoscopic vs open liver R/S, ablation, ehsan, hernia repair +/- mesh       Risks/benefits explained and accepted.  All questions answered

## 2018-06-18 NOTE — OP NOTE
Dictation #1  MRN:[id number]  [facility]:[id number]    #[id number]    [facility]    Name __ _Omid Falcon                    Date of Birth __1965 _____    Patient Admission Date to hospital:   6/18/2018    Surgeon(s):  NEEL Gould MD                     Other ___Deana Gentile________       Length of Operation: 4.5H    Type of Operation (check all that apply)     Procedure(s):  EXPLORATORY-LAPAROTOMY  RESECTION-HEPATIC SEGMENT VI  RESECTION HEPATIC SEGMENT VIII  THERMAL ABLATION 3 ADDITIONAL HEPATIC TUMORS SEGMENT VII  CHOLECYSTECTOMY  REPAIR-HERNIAS-INCISIONAL MULTIPLE -INITIAL with mesh VERSATEX  RETRORECTUS  ILENE NERVE BLOCK BILATERAL  LYSIS ADHESIONS  UNLISTED PROCEDURE ABDOMEN    DRAINS 0  EBL 50CC  IMPLANTS VERSATEX MESH  SPECIMENS  : LIVER METS, HERNIA SACKS, GALLBLADDER  COMP 0     Gastric Resection                    Small bowel resection                  Colon resection    Hepatic resection                     Pancreatic resection                    Whipple                   Lymph node resection             Cholecystectomy                          Adrenalectomy       Lung resection                          Mesenteric dissection                   Nephrectomy         Thyroidectomy                         RFA                                             Peritoneal stripping    Explorative                               Lysis of Adhesions              Diaphramatic Resection   Other  _____________________________________        Type of vascular encasements (check all that apply)      SMA        Renal        Aorta/Cava     Iliac       Lakeisha  Lakeisha Hepatis    Celiac      Was tumor collected?           XYes                 No          If yes, tumor form must be completed by Data staff.                   Neoprobe Used          Yes          X No    Was it helpful in finding the tumor?      Yes        No     Operative Findings  5 LIVER METASTASES, MULTIPLE INCISIONAL HERNIA DEFECTS, CHRONIC  CHOLECYSTITS     Location of Primary Tumor                      NA               Primary Resected     Yes      No   How many primary sites?         % of Tumor Debulked  100                  %of Mesenteric Tumor Debulked______NA___  Was sentinel lymph node done?      Yes       XNo    Number of [address] Sent ______         Number of [address] Positive   __        Evidence of lymph obstruction:    Yes      No    Mets to other organs:       Yes      LIVER    No                                            If yes, Nodes and Metastases form must be completed  Number of tumors found: _____5_________   Was tumor left behind? _________NO_____   How much small bowel removed (cm)?  ____NA_____    Seprafilm used:    Yes      X No  Visible ovarian masses:             Yes        No   Visible retroperitoneal nodes:   Yes        X No    Preop Sandostatin used:          X Yes        No    Intraop chemotherapy used:     X Yes         No      Gallstones present :  X Yes          No     N/A  Visible liver mets:      X Yes         No      Blood transfusion needed:    Yes        X No  Complications_______0__________________________________    Nodes & Metastases Form    Nodes    Resected   Mets  Tissue Resected?      Solid organ/soft     Cervical   Yes No                     X Liver   XYes No     Supraclavicular  Yes No   Bone   Yes No     Hilar           Yes No Brain   Yes No        Mediastinal  Yes No  Lung    Yes No     Auxiliary     Yes No Colon   Yes No     Mesenteric Yes No Pancreas  Yes No      Periportal Yes No Appendix  Yes No     Retroperitoneal Yes No  Thyroid     Yes No     Celiac Yes No Chest wall  Yes No     Inguinal Yes No  Kidney       Yes   No     Iliac Yes No Breast       Yes   No     Other Yes No Ovary        Yes No   ___________________ Pelvic Floor   Yes   No   Uterus         Yes No   Ureter         Yes   No   Seminal Vesicle YesNo      Spleen     Yes No   L Diaphram   Yes No   R Diaphram   Yes No    Other              Yes No   ____________________    ELECTROCAUTERY Ablation Form      Mode: Site:     X Open    Liver- R Lobe                        Percutaneous   Liver- L Lobe     Laproscopic    Kidney        Bone        Pelvis         Other  ____________________                                      #of Lesions3      Tumor Sizes  ________2MM________ _______________  ___________3MM_____ _______________  _______________5MM_ _______________  ________________ _______________    Complications?   YES X NO  UNKNOWN    Complications____________________________________________________________________________________________________________________________________

## 2018-06-18 NOTE — ANESTHESIA PROCEDURE NOTES
Arterial    Diagnosis: carcinoid    Patient location during procedure: done in OR  Procedure start time: 6/18/2018 10:20 AM  Timeout: 6/18/2018 10:19 AM  Procedure end time: 6/18/2018 10:26 AM  Staffing  Anesthesiologist: LAUREN CUMMINGS  Resident/CRNA: HERLINDA ALCARAZ  Performed: resident/CRNA   Preanesthetic Checklist  Completed: patient identified, site marked, surgical consent, pre-op evaluation, timeout performed, IV checked, risks and benefits discussed, monitors and equipment checked and anesthesia consent givenArterial  Skin Prep: chlorhexidine gluconate  Local Infiltration: none  Orientation: right  Location: radial  Catheter Size: 20 G  Catheter placement by Ultrasound guidance. Heme positive aspiration all ports.  Sterile sheath used.Insertion Attempts: 2  Assessment  Dressing: secured with tape and tegaderm

## 2018-06-18 NOTE — OP NOTE
DATE OF PROCEDURE:  06/18/2018    SURGEON:  NEEL Gould M.D.    ASSISTANTS:  Krupa and Dr. Thurston.    PREOPERATIVE DIAGNOSES:  Carcinoid tumors with carcinoid syndrome, metastatic to   liver, chronic cholecystitis.    POSTOPERATIVE DIAGNOSES:  Carcinoid tumors with carcinoid syndrome, metastatic   to liver, chronic cholecystitis with 5 tumors found in the liver.    PROCEDURES:  Resection liver tumor segment 6, resection liver tumor segment 8,   thermal ablation of 3 additional hepatic tumors in segment 7; cholecystectomy,   repair of multiple incisional hernias with Versatex mesh retrorectus, TAP nerve   block, bilateral, lysis of adhesions and unlisted procedure abdomen.    FINDINGS:  5 tumors metastatic to the liver, adhesions, multiple incisional   hernias.    ESTIMATED BLOOD LOSS:  50 mL.    DRAINS:  None.    IMPLANTS:  Versatex mesh.    SPECIMENS:  Liver metastases, hernia sacs and gallbladder.    COMPLICATIONS:  None.    PROCEDURE IN DETAIL:  With the patient supine on the Operating Room table under   general endotracheal anesthesia, invasive monitoring in place under Sandostatin   infusion protocol, the patient was explored through the upper midline incision.    Upon entering the abdomen, multiple incisional hernia defects were encountered,   adhesions were taken down, the ligamentous attachments of the liver were taken   down.  The liver was mobilized.  The liver could be fully evaluated, gallbladder   was quite enlarged and thickened.  There were adhesions to the gallbladder,   which were taken down with electrocautery.  The gallbladder was dissected way   from gallbladder fossa with electrocautery till the cystic artery, cystic duct   and critical view were clearly obtained.  Once this was done, these structures   were ligated with 2-0 silk ligatures and marked with Hemoclips and transected   and the gallbladder was removed from the field.  The hepatic duct was identified   and preserved as was  the common bile duct.  Next, attention was turned to the   liver where intraoperative ultrasound revealed 2 tumors noted on the   preoperative imaging studies and one in the dome in segment 8 and one in the   inferior posterior in segment 6.  However, 3 additional tumors were found on the   surface of the liver in segment 7.  They were between 2 and 5 mm in diameter.    These 3 tumors were destroyed with electrocautery and ablated.  The tumor on the   dome of the liver was excised with crushing forceps after scoring the liver   surface with electrocautery, this tumor was approximately a 2 cm tumor.  The   defect in the liver was packed with Surgicel, and FloSeal and pressure was held   for 5 minutes.  Hemostasis was secured.  Attention was then turned to the lesion   in segment 6 where a hepatotomy was created directly over the tumor.  The tumor   was dissected out of the liver parenchyma again with crushing forceps and the   tumor was excised from the hepatic parenchyma.  The defect in the liver was   controlled with electrocautery.  It was then marked with Hemoclips and packed   with Gelfoam soaked in 5-FU and sutured in place with a hepatorrhaphy sutures of   2-0 Prolene U stitches.  The defect in the top of the liver was also packed   with 5-FU soaked Gelfoam and marked with Hemoclips for future imaging purposes   and covered with Vitagel BioGlue.  The liver was placed back down in the right   upper quadrant in its natural position.  There were no additional drop   metastases or metastases in the gutters or the diaphragms or in the   retroperitoneum.  The bowel was run and there were no tumors evident in the   bowel.  The anastomosis appeared to be widely patent.  There were no tumors   evident in the mesenteric root.  Additional adhesiolysis was performed again to   perform a thorough exploration.  Once this was done, the abdomen was irrigated   with antibiotic saline solution and aspirated clear.  Gloves were  changed and   attention was then turned to the hernia defects where the hernia sacs were   individually excised with electrocautery.  Next, the anterior and posterior   rectus sheath were .  The rectus muscle was elevated off the posterior   rectus sheath.  Next, the posterior rectus sheath was reapproximated with   running 0 PDS suture using 0.5 x 0.5 cm advance purchase.  Once the posterior   rectus sheath was closed, next the Versatex mesh was laid over the posterior   rectus sheath after it was trimmed to fit.  Next, the rectus muscle was placed   back down over the mesh and the midline anterior fascia was reapproximated over   the mesh and the rectus muscle with additional #1 running Prolene suture.  The   wound was copiously irrigated with antibiotic saline solution in each layer.    Additional 4-0 PDS was used to tack the subcutaneous flaps back down to the   midline and eliminate the dead space.  The dead space was created by the   multiple large hernias.  Once the dead space was closed and the skin was closed   with 4-0 Monocryl, an intracuticular and Dermabond was applied.  Additional   Exparel and Marcaine was injected into the skin to assist with postoperative   pain control.  In addition, prior to closure of the midline wound, a bilateral   TAP block was performed under direct vision using Exparel mixed with Marcaine   and saline and the block was performed along the lateral edge of the rectus   muscle blocking all the layers of the transversalis external and internal   oblique layers as well as the rectus.  This was done on both sides.  A total of   150 mL of mixture was injected made up of 20 mL of Exparel, 30 mL of Marcaine   and 100 mL of saline.  Once the blocks were done, the incision was closed in the   aforementioned fashion.  An additional Exparel and Marcaine was injected into   the skin edges to assist with postoperative pain control.  Estimated blood loss   was less than 50 mL.  No  drains were placed.  Sponge and needle counts were   correct at the end of the procedure and confirmed by x-ray.  The patient was   taken to the Intensive Care Unit in stable condition.      ANDREAS  dd: 06/18/2018 14:35:28 (CDT)  td: 06/18/2018 18:43:50 (CDT)  Doc ID   #[id number]  Job ID #[id number]    CC:

## 2018-06-18 NOTE — ADDENDUM NOTE
Addendum  created 06/18/18 1617 by Rajeev Rebolledo, CRNA    Anesthesia Intra Flowsheets edited

## 2018-06-19 LAB
ALBUMIN SERPL BCP-MCNC: 3.5 G/DL
ALP SERPL-CCNC: 48 U/L
ALT SERPL W/O P-5'-P-CCNC: 300 U/L
ANION GAP SERPL CALC-SCNC: 6 MMOL/L
AST SERPL-CCNC: 212 U/L
BASOPHILS # BLD AUTO: 0.02 K/UL
BASOPHILS NFR BLD: 0.2 %
BILIRUB SERPL-MCNC: 1.3 MG/DL
BUN SERPL-MCNC: 16 MG/DL
CALCIUM SERPL-MCNC: 8.5 MG/DL
CHLORIDE SERPL-SCNC: 106 MMOL/L
CO2 SERPL-SCNC: 23 MMOL/L
CREAT SERPL-MCNC: 1.1 MG/DL
DIFFERENTIAL METHOD: ABNORMAL
EOSINOPHIL # BLD AUTO: 0 K/UL
EOSINOPHIL NFR BLD: 0 %
ERYTHROCYTE [DISTWIDTH] IN BLOOD BY AUTOMATED COUNT: 13.3 %
EST. GFR  (AFRICAN AMERICAN): >60 ML/MIN/1.73 M^2
EST. GFR  (NON AFRICAN AMERICAN): >60 ML/MIN/1.73 M^2
GLUCOSE SERPL-MCNC: 168 MG/DL
HCT VFR BLD AUTO: 34.4 %
HGB BLD-MCNC: 11.3 G/DL
LYMPHOCYTES # BLD AUTO: 0.4 K/UL
LYMPHOCYTES NFR BLD: 4.4 %
MAGNESIUM SERPL-MCNC: 2.1 MG/DL
MCH RBC QN AUTO: 28.9 PG
MCHC RBC AUTO-ENTMCNC: 32.8 G/DL
MCV RBC AUTO: 88 FL
MONOCYTES # BLD AUTO: 0.6 K/UL
MONOCYTES NFR BLD: 5.6 %
NEUTROPHILS # BLD AUTO: 8.8 K/UL
NEUTROPHILS NFR BLD: 89.6 %
PHOSPHATE SERPL-MCNC: 2.6 MG/DL
PLATELET # BLD AUTO: 139 K/UL
PMV BLD AUTO: 9.6 FL
POTASSIUM SERPL-SCNC: 4.1 MMOL/L
PROT SERPL-MCNC: 6.1 G/DL
RBC # BLD AUTO: 3.91 M/UL
SODIUM SERPL-SCNC: 135 MMOL/L
WBC # BLD AUTO: 9.78 K/UL

## 2018-06-19 PROCEDURE — 83735 ASSAY OF MAGNESIUM: CPT

## 2018-06-19 PROCEDURE — 94761 N-INVAS EAR/PLS OXIMETRY MLT: CPT

## 2018-06-19 PROCEDURE — 25000003 PHARM REV CODE 250: Performed by: STUDENT IN AN ORGANIZED HEALTH CARE EDUCATION/TRAINING PROGRAM

## 2018-06-19 PROCEDURE — 94799 UNLISTED PULMONARY SVC/PX: CPT

## 2018-06-19 PROCEDURE — S0030 INJECTION, METRONIDAZOLE: HCPCS | Performed by: SURGERY

## 2018-06-19 PROCEDURE — 63600175 PHARM REV CODE 636 W HCPCS: Performed by: STUDENT IN AN ORGANIZED HEALTH CARE EDUCATION/TRAINING PROGRAM

## 2018-06-19 PROCEDURE — 80053 COMPREHEN METABOLIC PANEL: CPT

## 2018-06-19 PROCEDURE — 25000003 PHARM REV CODE 250: Performed by: SURGERY

## 2018-06-19 PROCEDURE — 99900035 HC TECH TIME PER 15 MIN (STAT)

## 2018-06-19 PROCEDURE — 85025 COMPLETE CBC W/AUTO DIFF WBC: CPT

## 2018-06-19 PROCEDURE — 27000221 HC OXYGEN, UP TO 24 HOURS

## 2018-06-19 PROCEDURE — 27000646 HC AEROBIKA DEVICE

## 2018-06-19 PROCEDURE — 84100 ASSAY OF PHOSPHORUS: CPT

## 2018-06-19 PROCEDURE — 94664 DEMO&/EVAL PT USE INHALER: CPT

## 2018-06-19 PROCEDURE — 20000000 HC ICU ROOM

## 2018-06-19 PROCEDURE — 63600175 PHARM REV CODE 636 W HCPCS: Performed by: SURGERY

## 2018-06-19 PROCEDURE — 94770 HC EXHALED C02 TEST: CPT

## 2018-06-19 RX ORDER — ENOXAPARIN SODIUM 100 MG/ML
40 INJECTION SUBCUTANEOUS
Status: COMPLETED | OUTPATIENT
Start: 2018-06-19 | End: 2018-06-19

## 2018-06-19 RX ADMIN — ACETAMINOPHEN 1000 MG: 10 INJECTION, SOLUTION INTRAVENOUS at 02:06

## 2018-06-19 RX ADMIN — ENOXAPARIN SODIUM 40 MG: 100 INJECTION SUBCUTANEOUS at 05:06

## 2018-06-19 RX ADMIN — CIPROFLOXACIN 400 MG: 2 INJECTION, SOLUTION INTRAVENOUS at 03:06

## 2018-06-19 RX ADMIN — KETOROLAC TROMETHAMINE 10 MG: 30 INJECTION, SOLUTION INTRAMUSCULAR at 11:06

## 2018-06-19 RX ADMIN — OCTREOTIDE ACETATE 125 MCG/HR: 1000 INJECTION, SOLUTION INTRAVENOUS; SUBCUTANEOUS at 09:06

## 2018-06-19 RX ADMIN — ACETAMINOPHEN 1000 MG: 10 INJECTION, SOLUTION INTRAVENOUS at 06:06

## 2018-06-19 RX ADMIN — ACETAMINOPHEN 1000 MG: 10 INJECTION, SOLUTION INTRAVENOUS at 08:06

## 2018-06-19 RX ADMIN — ONDANSETRON 4 MG: 2 INJECTION INTRAMUSCULAR; INTRAVENOUS at 09:06

## 2018-06-19 RX ADMIN — ONDANSETRON 4 MG: 2 INJECTION INTRAMUSCULAR; INTRAVENOUS at 08:06

## 2018-06-19 RX ADMIN — KETOROLAC TROMETHAMINE 10 MG: 30 INJECTION, SOLUTION INTRAMUSCULAR at 12:06

## 2018-06-19 RX ADMIN — KETOROLAC TROMETHAMINE 10 MG: 30 INJECTION, SOLUTION INTRAMUSCULAR at 05:06

## 2018-06-19 RX ADMIN — METRONIDAZOLE 500 MG: 500 INJECTION, SOLUTION INTRAVENOUS at 12:06

## 2018-06-19 RX ADMIN — PREGABALIN 75 MG: 75 CAPSULE ORAL at 08:06

## 2018-06-19 RX ADMIN — KETOROLAC TROMETHAMINE 10 MG: 30 INJECTION, SOLUTION INTRAMUSCULAR at 06:06

## 2018-06-19 RX ADMIN — SODIUM PHOSPHATE, MONOBASIC, MONOHYDRATE 30 MMOL: 276; 142 INJECTION, SOLUTION INTRAVENOUS at 09:06

## 2018-06-19 RX ADMIN — DEXTROSE MONOHYDRATE, SODIUM CHLORIDE, AND POTASSIUM CHLORIDE: 50; 4.5; 1.49 INJECTION, SOLUTION INTRAVENOUS at 02:06

## 2018-06-19 RX ADMIN — IRON SUCROSE 100 MG: 20 INJECTION, SOLUTION INTRAVENOUS at 09:06

## 2018-06-19 NOTE — PROGRESS NOTES
[facility]/General Surgery  Progress Note    Admit Date: 6/18/2018  Hospital Day: 1  Procedure: 1 Day Post-Op s/p multiple liver resections, cholecystectomy w/ ventral hernia repair    SUBJECTIVE     Interval HPI:     Some nausea over night. Pain controlled with PCA.   Vitals stable. No gas. Good urine outpt    Review of Systems  Constitutional: no fever or chills  Eyes: no visual changes  ENT: no nasal congestion or sore throat  Respiratory: no cough or shortness of breath  Cardiovascular: no chest pain or palpitations  Gastrointestinal: positive for abdominal pain and nausea  Genitourinary: no hematuria or dysuria  Integument/Breast: no rash or pruritis  Hematologic/Lymphatic: no easy bruising or lymphadenopathy  Musculoskeletal: no arthralgias or myalgias  Neurological: no seizures or tremors  Behavioral/Psych: no auditory or visual hallucinations  Endocrine: no heat or cold intolerance    OBJECTIVE     Vital Signs:  Temp:  [98.1 °F (36.7 °C)-98.5 °F (36.9 °C)] 98.2 °F (36.8 °C)  Pulse:  [80-95] 80  Resp:  [8-21] 8  SpO2:  [93 %-99 %] 96 %  BP: (107-128)/(63-82) 112/68  Arterial Line BP: ()/(66-94) 90/66    I & O (Last 24H):  06/18 0701 - 06/19 0700  In: 4140.5 [I.V.:3240.5]  Out: 1775 [Urine:1575; Drains:200]    Physical Exam:  NAD, conversant good mood  Rrr, hemodynamically stable  shallow breathing, clear  abd soft, tight, minimal pain, incision c/d/i  Minaya in place       Results:  Recent Labs      06/18/18   0743  06/18/18   1051  06/18/18   1505  06/19/18   0600   WBC  4.37   --   12.76*  9.78   HGB  13.1*   --   12.4*  11.3*   HCT  40.6  36  37.3*  34.4*   PLT  160   --   174  139*   NA  139   --   138  135*   K  4.1   --   4.2  4.1   CL  107   --   108  106   CO2  23   --   22*  23   BUN  18   --   16  16   CREATININE  1.2   --   1.1  1.1   BILITOT  0.5   --    --   1.3*   AST  19   --    --   212*   ALT  31   --    --   300*   ALKPHOS  61   --    --   48*   CALCIUM  9.5   --   7.9*   8.5*   ALBUMIN  4.0   --    --   3.5   PROT  7.5   --    --   6.1   MG   --    --   1.6  2.1   PHOS   --    --   3.1  2.6*   INR  0.9   --    --    --    APTT  24.2   --    --    --          Recent Labs  Lab 06/18/18  1051   PH 7.404   PCO2 36.4   PO2 115*   HCO3 22.8*   POCSATURATED 99   BE -2       Scheduled Meds:   acetaminophen  1,000 mg Intravenous Q8H    HYDROmorphone  0.5 mg Intravenous Once    iron sucrose (VENOFER) IVPB  100 mg Intravenous Daily    ketorolac  9.999 mg Intravenous Q6H    pregabalin  75 mg Oral BID    sodium phosphate IVPB  30 mmol Intravenous Once     Continuous Infusions:   dextrose 5 % and 0.45 % NaCl with KCl 20 mEq 40 mL/hr at 06/18/18 1558    hydromorphone in 0.9 % NaCl 6 mg/30 ml      octreotide infusion (50 mcg/mL) 125 mcg/hr (06/18/18 1627)     PRN Meds:albuterol sulfate, diphenhydrAMINE, hydrALAZINE, labetalol, naloxone, ondansetron    ASSESSMENT     1. 1 Day Post-Op s/p liver resection with cholecystecomy and incisional hernia repair  2. Doing well post op    PLAN     Neuro:  Wnl, will monitor, pca for pain  CV:  HD stable, some mild hypotension w/ no tachycardica   Resp:  Clear, encourage IS and resp treatments  Renal:  Slight bump in creat, good urine outpt will follow  FEN/GI:  S/p exlap, ng with 200, still with nausea, pain controlled, no flatus, ARBF  Heme/ID:  H/h stable, will follow  Endo:  Glucose stable    Abx:  Post op cipro flag  Ppx:  Lovenox today    Dispo:  Continue ICU care    Damon Leblanc MD   [facility] General Surgery -II  [phone]

## 2018-06-19 NOTE — PLAN OF CARE
TN went to meet with patient, family at bedside. Patient is independent and lives with his family at home. He does not have home health or medical equipment at home. Patient still drives. Wife will provide transport on discharge. TN will continue to follow and assess discharge needs.    Future Appointments  Date Time Provider Department Center   10/8/2018 9:30 AM Jim Edwards MD [facility]        06/19/18 1353   Discharge Assessment   Assessment Type Discharge Planning Assessment   Confirmed/corrected address and phone number on facesheet? Yes   Assessment information obtained from? Patient   Prior to hospitilization cognitive status: Alert/Oriented   Prior to hospitalization functional status: Independent   Current cognitive status: Alert/Oriented   Current Functional Status: Independent   Lives With spouse   Able to Return to Prior Arrangements yes   Is patient able to care for self after discharge? Yes   Patient's perception of discharge disposition home or selfcare   Readmission Within The Last 30 Days no previous admission in last 30 days   Equipment Currently Used at Home none   Discharge Plan A Home with family   Discharge Plan B Home with family;Home Health   Patient/Family In Agreement With Plan yes     Adina Julian RN  Transition Navigator  [phone]

## 2018-06-19 NOTE — PLAN OF CARE
Problem: Infection, Risk/Actual (Adult)  Goal: Identify Related Risk Factors and Signs and Symptoms  Related risk factors and signs and symptoms are identified upon initiation of Human Response Clinical Practice Guideline (CPG)   Monitor pt's incision site for redness, swelling, or drainage, monitor VS, labs, IV antbx per MD orders

## 2018-06-19 NOTE — PLAN OF CARE
Problem: Patient Care Overview  Goal: Plan of Care Review  Outcome: Ongoing (interventions implemented as appropriate)  No acute events occurred throughout shift. Pt sat up in chair for approx 3 hours this AM, no complications. NGT inadvertently removed and then reinserted; placement verbally verified by MD Krupa following KUB. Approx 200cc dark brown drainage from NGT this shift. Incision CDI without significant changes from yesterday. Pt remains on nicolle gtt and PCA.  Plan of care reviewed with pt and pt's family at 1400. All questions and concerns addressed. See flowsheets for VS and assessment information. Will continue to monitor.

## 2018-06-20 PROBLEM — K81.1 CHRONIC CHOLECYSTITIS: Status: RESOLVED | Noted: 2018-06-18 | Resolved: 2018-06-20

## 2018-06-20 LAB
ALBUMIN SERPL BCP-MCNC: 3.1 G/DL
ALP SERPL-CCNC: 58 U/L
ALT SERPL W/O P-5'-P-CCNC: 230 U/L
ANION GAP SERPL CALC-SCNC: 5 MMOL/L
AST SERPL-CCNC: 101 U/L
BASOPHILS # BLD AUTO: 0.01 K/UL
BASOPHILS NFR BLD: 0.1 %
BILIRUB SERPL-MCNC: 1.3 MG/DL
BUN SERPL-MCNC: 17 MG/DL
CALCIUM SERPL-MCNC: 8.3 MG/DL
CHLORIDE SERPL-SCNC: 105 MMOL/L
CO2 SERPL-SCNC: 29 MMOL/L
CREAT SERPL-MCNC: 1.1 MG/DL
DIFFERENTIAL METHOD: ABNORMAL
EOSINOPHIL # BLD AUTO: 0.1 K/UL
EOSINOPHIL NFR BLD: 1.2 %
ERYTHROCYTE [DISTWIDTH] IN BLOOD BY AUTOMATED COUNT: 13.5 %
EST. GFR  (AFRICAN AMERICAN): >60 ML/MIN/1.73 M^2
EST. GFR  (NON AFRICAN AMERICAN): >60 ML/MIN/1.73 M^2
GLUCOSE SERPL-MCNC: 149 MG/DL
HCT VFR BLD AUTO: 32.8 %
HGB BLD-MCNC: 10.7 G/DL
LYMPHOCYTES # BLD AUTO: 0.5 K/UL
LYMPHOCYTES NFR BLD: 6.7 %
MAGNESIUM SERPL-MCNC: 2 MG/DL
MCH RBC QN AUTO: 29 PG
MCHC RBC AUTO-ENTMCNC: 32.6 G/DL
MCV RBC AUTO: 89 FL
MONOCYTES # BLD AUTO: 0.4 K/UL
MONOCYTES NFR BLD: 6.3 %
NEUTROPHILS # BLD AUTO: 5.8 K/UL
NEUTROPHILS NFR BLD: 85.4 %
PHOSPHATE SERPL-MCNC: 1.9 MG/DL
PLATELET # BLD AUTO: 121 K/UL
PMV BLD AUTO: 9.7 FL
POTASSIUM SERPL-SCNC: 3.7 MMOL/L
PROT SERPL-MCNC: 5.7 G/DL
RBC # BLD AUTO: 3.69 M/UL
SODIUM SERPL-SCNC: 139 MMOL/L
WBC # BLD AUTO: 6.83 K/UL

## 2018-06-20 PROCEDURE — 63600175 PHARM REV CODE 636 W HCPCS: Performed by: FAMILY MEDICINE

## 2018-06-20 PROCEDURE — 63600175 PHARM REV CODE 636 W HCPCS: Performed by: SURGERY

## 2018-06-20 PROCEDURE — 94664 DEMO&/EVAL PT USE INHALER: CPT

## 2018-06-20 PROCEDURE — 99900035 HC TECH TIME PER 15 MIN (STAT)

## 2018-06-20 PROCEDURE — 80053 COMPREHEN METABOLIC PANEL: CPT

## 2018-06-20 PROCEDURE — 25000003 PHARM REV CODE 250: Performed by: SURGERY

## 2018-06-20 PROCEDURE — 85025 COMPLETE CBC W/AUTO DIFF WBC: CPT

## 2018-06-20 PROCEDURE — 27000221 HC OXYGEN, UP TO 24 HOURS

## 2018-06-20 PROCEDURE — 25000003 PHARM REV CODE 250: Performed by: STUDENT IN AN ORGANIZED HEALTH CARE EDUCATION/TRAINING PROGRAM

## 2018-06-20 PROCEDURE — 11000001 HC ACUTE MED/SURG PRIVATE ROOM

## 2018-06-20 PROCEDURE — 94761 N-INVAS EAR/PLS OXIMETRY MLT: CPT

## 2018-06-20 PROCEDURE — 84100 ASSAY OF PHOSPHORUS: CPT

## 2018-06-20 PROCEDURE — 25000003 PHARM REV CODE 250: Performed by: FAMILY MEDICINE

## 2018-06-20 PROCEDURE — 83735 ASSAY OF MAGNESIUM: CPT

## 2018-06-20 RX ORDER — TRAMADOL HYDROCHLORIDE 50 MG/1
50 TABLET ORAL EVERY 6 HOURS PRN
Status: DISCONTINUED | OUTPATIENT
Start: 2018-06-20 | End: 2018-06-20

## 2018-06-20 RX ORDER — METOCLOPRAMIDE HYDROCHLORIDE 5 MG/ML
10 INJECTION INTRAMUSCULAR; INTRAVENOUS EVERY 6 HOURS
Status: DISCONTINUED | OUTPATIENT
Start: 2018-06-20 | End: 2018-06-23 | Stop reason: HOSPADM

## 2018-06-20 RX ORDER — DEXTROSE MONOHYDRATE, SODIUM CHLORIDE, AND POTASSIUM CHLORIDE 50; 1.49; 4.5 G/1000ML; G/1000ML; G/1000ML
INJECTION, SOLUTION INTRAVENOUS CONTINUOUS
Status: DISCONTINUED | OUTPATIENT
Start: 2018-06-20 | End: 2018-06-22

## 2018-06-20 RX ORDER — KETOROLAC TROMETHAMINE 30 MG/ML
10 INJECTION, SOLUTION INTRAMUSCULAR; INTRAVENOUS EVERY 6 HOURS PRN
Status: DISPENSED | OUTPATIENT
Start: 2018-06-20 | End: 2018-06-23

## 2018-06-20 RX ORDER — TRAMADOL HYDROCHLORIDE 50 MG/1
100 TABLET ORAL EVERY 6 HOURS
Status: COMPLETED | OUTPATIENT
Start: 2018-06-20 | End: 2018-06-21

## 2018-06-20 RX ADMIN — METOCLOPRAMIDE 10 MG: 5 INJECTION, SOLUTION INTRAMUSCULAR; INTRAVENOUS at 05:06

## 2018-06-20 RX ADMIN — TRAMADOL HYDROCHLORIDE 100 MG: 50 TABLET, COATED ORAL at 05:06

## 2018-06-20 RX ADMIN — KETOROLAC TROMETHAMINE 10 MG: 30 INJECTION, SOLUTION INTRAMUSCULAR at 05:06

## 2018-06-20 RX ADMIN — PREGABALIN 75 MG: 75 CAPSULE ORAL at 09:06

## 2018-06-20 RX ADMIN — TRAMADOL HYDROCHLORIDE 100 MG: 50 TABLET, COATED ORAL at 11:06

## 2018-06-20 RX ADMIN — ONDANSETRON 4 MG: 2 INJECTION INTRAMUSCULAR; INTRAVENOUS at 06:06

## 2018-06-20 RX ADMIN — ACETAMINOPHEN 1000 MG: 10 INJECTION, SOLUTION INTRAVENOUS at 05:06

## 2018-06-20 RX ADMIN — KETOROLAC TROMETHAMINE 10 MG: 30 INJECTION, SOLUTION INTRAMUSCULAR at 09:06

## 2018-06-20 RX ADMIN — DEXTROSE MONOHYDRATE, SODIUM CHLORIDE, AND POTASSIUM CHLORIDE: 50; 4.5; 1.49 INJECTION, SOLUTION INTRAVENOUS at 03:06

## 2018-06-20 RX ADMIN — ACETAMINOPHEN 1000 MG: 10 INJECTION, SOLUTION INTRAVENOUS at 01:06

## 2018-06-20 RX ADMIN — METOCLOPRAMIDE 10 MG: 5 INJECTION, SOLUTION INTRAMUSCULAR; INTRAVENOUS at 11:06

## 2018-06-20 RX ADMIN — IRON SUCROSE 100 MG: 20 INJECTION, SOLUTION INTRAVENOUS at 08:06

## 2018-06-20 NOTE — NURSING TRANSFER
Nursing Transfer Note      6/20/2018     Transfer To: 504    Transfer via wheelchair        Transported by Nathalia workman    Medicines sent: yes    Chart send with patient: Yes    Notified: spouse    Patient reassessed at: 6/20/18 1415    Upon arrival to floor: patient oriented to room, call bell in reach and bed in lowest position, LALA Healy at bedside.pt in stable condition

## 2018-06-20 NOTE — PLAN OF CARE
Pt aaox3.  C/o intermittent pain with relief from meds.  IV fluids infusing.  On tele, running NSR.  NPO except meds.  NGT to rt nare on low cont suction.  Midline abd incision CDI, open to air.  RIJ-TLC CDI.  Up with standby assist.  VSS.

## 2018-06-20 NOTE — PLAN OF CARE
TN went to meet with patient, family at bedside. Patient transferred from ICU to 5th floor. Will continue to follow and assess discharge needs.    Procedure: 2 Days Post-Op s/p multiple liver resections, cholecystectomy w/ ventral hernia repair      06/20/18 1432   Discharge Reassessment   Assessment Type Discharge Planning Reassessment   Discharge Plan A Home with family   Discharge Plan B Home with family;Home Health     Adina Julian RN  Transition Navigator  [phone]

## 2018-06-20 NOTE — PROGRESS NOTES
[facility]/General Surgery  Progress Note    Admit Date: 6/18/2018  Hospital Day: 2  Procedure: 2 Days Post-Op s/p multiple liver resections, cholecystectomy w/ ventral hernia repair    SUBJECTIVE     Interval HPI:     Some nausea over night. Pain controlled, minimal use of PCA   Vitals stable. No gas. Good urine outpt  Has gotten out of bed.  Some nausea with PCA    Review of Systems  Constitutional: no fever or chills  Eyes: no visual changes  ENT: no nasal congestion or sore throat  Respiratory: no cough or shortness of breath  Cardiovascular: no chest pain or palpitations  Gastrointestinal: positive for abdominal pain and nausea  Genitourinary: no hematuria or dysuria  Integument/Breast: no rash or pruritis  Hematologic/Lymphatic: no easy bruising or lymphadenopathy  Musculoskeletal: no arthralgias or myalgias  Neurological: no seizures or tremors  Behavioral/Psych: no auditory or visual hallucinations  Endocrine: no heat or cold intolerance    OBJECTIVE     Vital Signs:  Temp:  [98 °F (36.7 °C)-99.4 °F (37.4 °C)] 98.8 °F (37.1 °C)  Pulse:  [74-89] 84  Resp:  [9-28] 19  SpO2:  [92 %-98 %] 95 %  BP: (106-134)/(60-81) 122/77  Arterial Line BP: (96)/(73) 96/73    I & O (Last 24H):  06/19 0701 - 06/20 0700  In: 1494.2 [I.V.:1019.2]  Out: 2450 [Urine:1500; Drains:950]    Physical Exam:  NAD, conversant good mood  Rrr, hemodynamically stable  shallow breathing, clear, using IS  abd soft, tight, minimal pain, incision c/d/i  Minaya in place       Results:  Recent Labs      06/18/18   0743   06/18/18   1505  06/19/18   0600  06/20/18   0421   WBC  4.37   --   12.76*  9.78  6.83   HGB  13.1*   --   12.4*  11.3*  10.7*   HCT  40.6   < >  37.3*  34.4*  32.8*   PLT  160   --   174  139*  121*   NA  139   --   138  135*  139   K  4.1   --   4.2  4.1  3.7   CL  107   --   108  106  105   CO2  23   --   22*  23  29   BUN  18   --   16  16  17   CREATININE  1.2   --   1.1  1.1  1.1   BILITOT  0.5   --    --    1.3*  1.3*   AST  19   --    --   212*  101*   ALT  31   --    --   300*  230*   ALKPHOS  61   --    --   48*  58   CALCIUM  9.5   --   7.9*  8.5*  8.3*   ALBUMIN  4.0   --    --   3.5  3.1*   PROT  7.5   --    --   6.1  5.7*   MG   --    --   1.6  2.1  2.0   PHOS   --    --   3.1  2.6*  1.9*   INR  0.9   --    --    --    --    APTT  24.2   --    --    --    --     < > = values in this interval not displayed.       No results for input(s): PH, PCO2, PO2, HCO3, POCSATURATED, BE in the last 24 hours.    Scheduled Meds:   acetaminophen  1,000 mg Intravenous Q8H    HYDROmorphone  0.5 mg Intravenous Once    iron sucrose (VENOFER) IVPB  100 mg Intravenous Daily    ketorolac  9.999 mg Intravenous Q6H    pregabalin  75 mg Oral BID     Continuous Infusions:   dextrose 5 % and 0.45 % NaCl with KCl 20 mEq 40 mL/hr at 06/19/18 1900    hydromorphone in 0.9 % NaCl 6 mg/30 ml      octreotide infusion (50 mcg/mL) 125 mcg/hr (06/19/18 2157)     PRN Meds:albuterol sulfate, diphenhydrAMINE, hydrALAZINE, labetalol, naloxone, ondansetron    ASSESSMENT     1. 2 Days Post-Op s/p liver resection with cholecystecomy and incisional hernia repair  2. Doing well post op    PLAN     Neuro:  Wnl, will monitor, ekta DC pca, Orals and Toradol  CV:  HD stable, no tachycardica   Resp:  Clear, encourage IS and resp treatments  Renal:  Good Urine outpt, creat stable, Dc de leon  FEN/GI:  S/p exlap, ng with 900, still with nausea, pain controlled, no flatus, ARBF  Heme/ID:  H/h stable, will follow  Endo:  Glucose stable    Abx: none  Ppx:  Lovenox today    Dispo:  Step Down    D[facility]blanc MD   [facility]  [phone]

## 2018-06-20 NOTE — PLAN OF CARE
Problem: Patient Care Overview  Goal: Plan of Care Review  Outcome: Ongoing (interventions implemented as appropriate)  Patient on oxygen with documented flow.  Will attempt to wean per O2 order protocol. Patient performs IS therapy independently. While performing Aerobika therapy with RT, patient states that while doing Aerobika therapy earlier today, his NG tube came out when he coughed. The night RN was present while the patient stated this. Will continue to monitor.

## 2018-06-20 NOTE — PLAN OF CARE
Problem: Patient Care Overview  Goal: Plan of Care Review  Outcome: Ongoing (interventions implemented as appropriate)  Tmax temp 99.3 axillary. VSS throughout the night. Remains on fluids, Murtaza, and PCA pump. Complains of soreness, not much pain. Awake, alert, oriented. Midline abdominal incision clean, dry and intact open to room air. No hematoma, no drainage, no bleeding. Abdomen contour irregular, distended, soft/nontender. Hypoactive bowel sounds. Urine output about 40-45/hour total of 600 cc cynthia color. NG tube output 600 cc brown color. Safety precautions in place.    Problem: Surgery Nonspecified (Adult)  Goal: Signs and Symptoms of Listed Potential Problems Will be Absent, Minimized or Managed (Surgery Nonspecified)  Signs and symptoms of listed potential problems will be absent, minimized or managed by discharge/transition of care (reference Surgery Nonspecified (Adult) CPG).   Outcome: Ongoing (interventions implemented as appropriate)  Midline abdominal incision open to air, dermabond. Clean, dry and intact. No hematoma, no drainage, no bleeding. Edges approximated.

## 2018-06-21 LAB
ALBUMIN SERPL BCP-MCNC: 3 G/DL
ALP SERPL-CCNC: 65 U/L
ALT SERPL W/O P-5'-P-CCNC: 150 U/L
ANION GAP SERPL CALC-SCNC: 4 MMOL/L
AST SERPL-CCNC: 41 U/L
BASOPHILS # BLD AUTO: 0.01 K/UL
BASOPHILS NFR BLD: 0.2 %
BILIRUB SERPL-MCNC: 0.9 MG/DL
BUN SERPL-MCNC: 15 MG/DL
CALCIUM SERPL-MCNC: 8.4 MG/DL
CHLORIDE SERPL-SCNC: 104 MMOL/L
CO2 SERPL-SCNC: 30 MMOL/L
CREAT SERPL-MCNC: 1 MG/DL
DIFFERENTIAL METHOD: ABNORMAL
EOSINOPHIL # BLD AUTO: 0.1 K/UL
EOSINOPHIL NFR BLD: 1.1 %
ERYTHROCYTE [DISTWIDTH] IN BLOOD BY AUTOMATED COUNT: 13.3 %
EST. GFR  (AFRICAN AMERICAN): >60 ML/MIN/1.73 M^2
EST. GFR  (NON AFRICAN AMERICAN): >60 ML/MIN/1.73 M^2
GLUCOSE SERPL-MCNC: 141 MG/DL
HCT VFR BLD AUTO: 34.6 %
HGB BLD-MCNC: 11.3 G/DL
LYMPHOCYTES # BLD AUTO: 0.6 K/UL
LYMPHOCYTES NFR BLD: 8.8 %
MAGNESIUM SERPL-MCNC: 2 MG/DL
MCH RBC QN AUTO: 29.2 PG
MCHC RBC AUTO-ENTMCNC: 32.7 G/DL
MCV RBC AUTO: 89 FL
MONOCYTES # BLD AUTO: 0.4 K/UL
MONOCYTES NFR BLD: 6.6 %
NEUTROPHILS # BLD AUTO: 5.3 K/UL
NEUTROPHILS NFR BLD: 82.8 %
PHOSPHATE SERPL-MCNC: 1.9 MG/DL
PLATELET # BLD AUTO: 148 K/UL
PMV BLD AUTO: 9.7 FL
POTASSIUM SERPL-SCNC: 3.7 MMOL/L
PROT SERPL-MCNC: 5.9 G/DL
RBC # BLD AUTO: 3.87 M/UL
SODIUM SERPL-SCNC: 138 MMOL/L
WBC # BLD AUTO: 6.4 K/UL

## 2018-06-21 PROCEDURE — 80053 COMPREHEN METABOLIC PANEL: CPT

## 2018-06-21 PROCEDURE — 63600175 PHARM REV CODE 636 W HCPCS: Performed by: STUDENT IN AN ORGANIZED HEALTH CARE EDUCATION/TRAINING PROGRAM

## 2018-06-21 PROCEDURE — 25000003 PHARM REV CODE 250: Performed by: STUDENT IN AN ORGANIZED HEALTH CARE EDUCATION/TRAINING PROGRAM

## 2018-06-21 PROCEDURE — 25000003 PHARM REV CODE 250: Performed by: SURGERY

## 2018-06-21 PROCEDURE — 63600175 PHARM REV CODE 636 W HCPCS: Performed by: FAMILY MEDICINE

## 2018-06-21 PROCEDURE — 27000221 HC OXYGEN, UP TO 24 HOURS

## 2018-06-21 PROCEDURE — 94761 N-INVAS EAR/PLS OXIMETRY MLT: CPT

## 2018-06-21 PROCEDURE — 25000003 PHARM REV CODE 250: Performed by: FAMILY MEDICINE

## 2018-06-21 PROCEDURE — 11000001 HC ACUTE MED/SURG PRIVATE ROOM

## 2018-06-21 PROCEDURE — 99900035 HC TECH TIME PER 15 MIN (STAT)

## 2018-06-21 PROCEDURE — 84100 ASSAY OF PHOSPHORUS: CPT

## 2018-06-21 PROCEDURE — 94664 DEMO&/EVAL PT USE INHALER: CPT

## 2018-06-21 PROCEDURE — 27000646 HC AEROBIKA DEVICE

## 2018-06-21 PROCEDURE — 63600175 PHARM REV CODE 636 W HCPCS: Performed by: SURGERY

## 2018-06-21 PROCEDURE — 83735 ASSAY OF MAGNESIUM: CPT

## 2018-06-21 PROCEDURE — 85025 COMPLETE CBC W/AUTO DIFF WBC: CPT

## 2018-06-21 RX ORDER — ENOXAPARIN SODIUM 100 MG/ML
40 INJECTION SUBCUTANEOUS EVERY EVENING
Status: DISCONTINUED | OUTPATIENT
Start: 2018-06-21 | End: 2018-06-23 | Stop reason: HOSPADM

## 2018-06-21 RX ORDER — TRAMADOL HYDROCHLORIDE 50 MG/1
50 TABLET ORAL EVERY 6 HOURS
Status: COMPLETED | OUTPATIENT
Start: 2018-06-21 | End: 2018-06-22

## 2018-06-21 RX ORDER — AMOXICILLIN 250 MG
1 CAPSULE ORAL DAILY PRN
Status: DISCONTINUED | OUTPATIENT
Start: 2018-06-21 | End: 2018-06-23 | Stop reason: HOSPADM

## 2018-06-21 RX ADMIN — TRAMADOL HYDROCHLORIDE 50 MG: 50 TABLET, COATED ORAL at 05:06

## 2018-06-21 RX ADMIN — METOCLOPRAMIDE 10 MG: 5 INJECTION, SOLUTION INTRAMUSCULAR; INTRAVENOUS at 05:06

## 2018-06-21 RX ADMIN — KETOROLAC TROMETHAMINE 10 MG: 30 INJECTION, SOLUTION INTRAMUSCULAR at 09:06

## 2018-06-21 RX ADMIN — DEXTROSE MONOHYDRATE, SODIUM CHLORIDE, AND POTASSIUM CHLORIDE: 50; 4.5; 1.49 INJECTION, SOLUTION INTRAVENOUS at 11:06

## 2018-06-21 RX ADMIN — TRAMADOL HYDROCHLORIDE 50 MG: 50 TABLET, COATED ORAL at 12:06

## 2018-06-21 RX ADMIN — ENOXAPARIN SODIUM 40 MG: 100 INJECTION SUBCUTANEOUS at 05:06

## 2018-06-21 RX ADMIN — TRAMADOL HYDROCHLORIDE 100 MG: 50 TABLET, COATED ORAL at 05:06

## 2018-06-21 RX ADMIN — METOCLOPRAMIDE 10 MG: 5 INJECTION, SOLUTION INTRAMUSCULAR; INTRAVENOUS at 11:06

## 2018-06-21 RX ADMIN — PREGABALIN 75 MG: 75 CAPSULE ORAL at 09:06

## 2018-06-21 RX ADMIN — DEXTROSE MONOHYDRATE, SODIUM CHLORIDE, AND POTASSIUM CHLORIDE: 50; 4.5; 1.49 INJECTION, SOLUTION INTRAVENOUS at 01:06

## 2018-06-21 RX ADMIN — IRON SUCROSE 100 MG: 20 INJECTION, SOLUTION INTRAVENOUS at 09:06

## 2018-06-21 RX ADMIN — DEXTROSE MONOHYDRATE, SODIUM CHLORIDE, AND POTASSIUM CHLORIDE: 50; 4.5; 1.49 INJECTION, SOLUTION INTRAVENOUS at 12:06

## 2018-06-21 RX ADMIN — METOCLOPRAMIDE 10 MG: 5 INJECTION, SOLUTION INTRAMUSCULAR; INTRAVENOUS at 12:06

## 2018-06-21 RX ADMIN — TRAMADOL HYDROCHLORIDE 50 MG: 50 TABLET, COATED ORAL at 11:06

## 2018-06-21 NOTE — PROGRESS NOTES
.Pharmacy New Medication Education    Patient accepted medication education.    Pharmacy educated patient on name and purpose of medications and possible side effects, using the teach-back method.     Current Inpatient Medication Orders   albuterol sulfate nebulizer solution 2.5 mg   dextrose 5 % and 0.45 % NaCl with KCl 20 mEq infusion   diphenhydrAMINE injection 12.5 mg   hydrALAZINE injection 5 mg   hydromorphone (PF) injection 0.5 mg   iron sucrose (VENOFER) 100 mg in sodium chloride 0.9% 100 mL IVPB   ketorolac injection 9.999 mg   labetalol injection 5 mg   metoclopramide HCl injection 10 mg   naloxone 0.4 mg/mL injection 0.02 mg   ondansetron injection 4 mg   pregabalin capsule 75 mg       Learners of pharmacy medication education included:  Patient    Patient +/- learner response:  Verbalized Understanding, Teachback

## 2018-06-21 NOTE — PLAN OF CARE
Problem: Patient Care Overview  Goal: Plan of Care Review  Outcome: Ongoing (interventions implemented as appropriate)  Pt on RA sats  93%.

## 2018-06-21 NOTE — PLAN OF CARE
"Problem: Patient Care Overview  Goal: Plan of Care Review  Outcome: Ongoing (interventions implemented as appropriate)  AAOX3.  Respirations even and unlabored; breath sounds clear with diminished bases.  O2 2L NC in place with saturation 92-95%.   IS encouraged.  TELE:  SR 70-80s without ectopy.  Denies n/v and sob.  C/o "not feeling well" but unable to elaborate.  Received toradol 10mg iv and slept well.  Right nare NGT to LCWS with 275ml of brown drainage noted.   Patient prefers meds crushed and admin via NGT.  Bowel sounds active.  Midline abd incision closed with dermabond without redness, swelling, or drainage noted.  SCDS in place.  Bed alarm on.  Instructed to call for assistance.  Will cont to monitor.      "

## 2018-06-21 NOTE — PROGRESS NOTES
[facility]/General [facility]  Progress Note    Admit Date: 6/18/2018  Hospital Day: 3  Procedure: 3 Days Post-Op s/p multiple liver resections, cholecystectomy w/ ventral hernia repair    SUBJECTIVE     Interval HPI:     No nausea over night. Pain controlled, with oral meds   Vitals stable. No gas. Good urine outpt  Has gotten out of bed and ambulating    Review of Systems  Constitutional: no fever or chills  Eyes: no visual changes  ENT: no nasal congestion or sore throat  Respiratory: no cough or shortness of breath  Cardiovascular: no chest pain or palpitations  Gastrointestinal: positive for abdominal pain and nausea  Genitourinary: no hematuria or dysuria  Integument/Breast: no rash or pruritis  Hematologic/Lymphatic: no easy bruising or lymphadenopathy  Musculoskeletal: no arthralgias or myalgias  Neurological: no seizures or tremors  Behavioral/Psych: no auditory or visual hallucinations  Endocrine: no heat or cold intolerance    OBJECTIVE     Vital Signs:  Temp:  [97.4 °F (36.3 °C)-99.5 °F (37.5 °C)] 97.4 °F (36.3 °C)  Pulse:  [67-87] 78  Resp:  [16-20] 19  SpO2:  [85 %-97 %] 93 %  BP: (121-142)/(69-88) 135/87    I & O (Last 24H):  06/20 0701 - 06/21 0700  In: 1825 [I.V.:1765]  Out: 640 [Urine:215; Drains:425]    Physical Exam:  NAD, conversant good mood  Rrr, hemodynamically stable  shallow breathing, clear, using IS  abd soft, tight, minimal pain, incision c/d/i  NGT with 425 out         Results:  Recent Labs      06/19/18   0600  06/20/18   0421  06/21/18   0520   WBC  9.78  6.83  6.40   HGB  11.3*  10.7*  11.3*   HCT  34.4*  32.8*  34.6*   PLT  139*  121*  148*   NA  135*  139  138   K  4.1  3.7  3.7   CL  106  105  104   CO2  23  29  30*   BUN  16  17  15   CREATININE  1.1  1.1  1.0   BILITOT  1.3*  1.3*  0.9   AST  212*  101*  41*   ALT  300*  230*  150*   ALKPHOS  48*  58  65   CALCIUM  8.5*  8.3*  8.4*   ALBUMIN  3.5  3.1*  3.0*   PROT  6.1  5.7*  5.9*   MG  2.1  2.0  2.0   PHOS  2.6*   1.9*  1.9*       No results for input(s): PH, PCO2, PO2, HCO3, POCSATURATED, BE in the last 24 hours.    Scheduled Meds:   HYDROmorphone  0.5 mg Intravenous Once    iron sucrose (VENOFER) IVPB  100 mg Intravenous Daily    metoclopramide HCl  10 mg Intravenous Q6H    pregabalin  75 mg Oral BID     Continuous Infusions:   dextrose 5 % and 0.45 % NaCl with KCl 20 mEq 100 mL/hr at 06/21/18 0100     PRN Meds:albuterol sulfate, diphenhydrAMINE, hydrALAZINE, ketorolac, labetalol, naloxone, ondansetron    ASSESSMENT     1. 3 Days Post-Op s/p liver resection with cholecystecomy and incisional hernia repair  2. Doing well post op, progressing    PLAN     Neuro:  Wnl, will monitor, Orals and Toradol  CV:  HD stable, no tachycardica   Resp:  Clear, encourage IS and resp treatments  Renal:  Good Urine outpt, creat stable  FEN/GI:  S/p exlap, ng with 425, nausea mostly resolved, pain controlled, no flatus, ARBF  Heme/ID:  H/h stable, will follow  Endo:  Glucose stable    Abx: none  Ppx:  Lovenox    Dispo:  Continue care on floor    Damon Leblanc MD   [facility]  [phone]

## 2018-06-21 NOTE — PLAN OF CARE
POD # 3 s/p multiple liver resections, cholecystectomy w/ ventral hernia repair    Pt stepdown from ICU yesterday; plan of care reviewed.  Pt voices no d/c needs at this time.  Tn to continue to follow.     06/21/18 5230   Discharge Reassessment   Assessment Type Discharge Planning Reassessment   Provided patient/caregiver education on the expected discharge date and the discharge plan Yes   Do you have any problems affording any of your prescribed medications? No   Discharge Plan A Home with family   Discharge Plan B Home with family;Home Health   Patient choice form signed by patient/caregiver N/A   Can the patient answer the patient profile reliably? Yes, cognitively intact   How does the patient rate their overall health at the present time? Fair   Describe the patient's ability to walk at the present time. Major restrictions/daily assistance from another person   How often would a person be available to care for the patient? Often   Number of comorbid conditions (as recorded on the chart) Four   During the past month, has the patient often been bothered by feeling down, depressed or hopeless? No   During the past month, has the patient often been bothered by little interest or pleasure in doing things? No

## 2018-06-22 LAB
ALBUMIN SERPL BCP-MCNC: 2.8 G/DL
ALP SERPL-CCNC: 89 U/L
ALT SERPL W/O P-5'-P-CCNC: 107 U/L
ANION GAP SERPL CALC-SCNC: 6 MMOL/L
AST SERPL-CCNC: 34 U/L
BASOPHILS # BLD AUTO: 0.01 K/UL
BASOPHILS NFR BLD: 0.2 %
BILIRUB SERPL-MCNC: 0.8 MG/DL
BUN SERPL-MCNC: 14 MG/DL
CALCIUM SERPL-MCNC: 8.2 MG/DL
CHLORIDE SERPL-SCNC: 105 MMOL/L
CO2 SERPL-SCNC: 25 MMOL/L
CREAT SERPL-MCNC: 0.9 MG/DL
DIFFERENTIAL METHOD: ABNORMAL
EOSINOPHIL # BLD AUTO: 0.1 K/UL
EOSINOPHIL NFR BLD: 1.8 %
ERYTHROCYTE [DISTWIDTH] IN BLOOD BY AUTOMATED COUNT: 13.1 %
EST. GFR  (AFRICAN AMERICAN): >60 ML/MIN/1.73 M^2
EST. GFR  (NON AFRICAN AMERICAN): >60 ML/MIN/1.73 M^2
GLUCOSE SERPL-MCNC: 129 MG/DL
HCT VFR BLD AUTO: 33.7 %
HGB BLD-MCNC: 10.8 G/DL
LYMPHOCYTES # BLD AUTO: 0.8 K/UL
LYMPHOCYTES NFR BLD: 14.8 %
MAGNESIUM SERPL-MCNC: 2 MG/DL
MCH RBC QN AUTO: 28.7 PG
MCHC RBC AUTO-ENTMCNC: 32 G/DL
MCV RBC AUTO: 90 FL
MONOCYTES # BLD AUTO: 0.4 K/UL
MONOCYTES NFR BLD: 6.7 %
NEUTROPHILS # BLD AUTO: 4.1 K/UL
NEUTROPHILS NFR BLD: 75.2 %
PHOSPHATE SERPL-MCNC: 2.3 MG/DL
PLATELET # BLD AUTO: 142 K/UL
PMV BLD AUTO: 9.4 FL
POTASSIUM SERPL-SCNC: 3.8 MMOL/L
PROT SERPL-MCNC: 5.8 G/DL
RBC # BLD AUTO: 3.76 M/UL
SODIUM SERPL-SCNC: 136 MMOL/L
WBC # BLD AUTO: 5.41 K/UL

## 2018-06-22 PROCEDURE — 94761 N-INVAS EAR/PLS OXIMETRY MLT: CPT

## 2018-06-22 PROCEDURE — 27000646 HC AEROBIKA DEVICE

## 2018-06-22 PROCEDURE — 83735 ASSAY OF MAGNESIUM: CPT

## 2018-06-22 PROCEDURE — 25000003 PHARM REV CODE 250: Performed by: SURGERY

## 2018-06-22 PROCEDURE — 11000001 HC ACUTE MED/SURG PRIVATE ROOM

## 2018-06-22 PROCEDURE — 85025 COMPLETE CBC W/AUTO DIFF WBC: CPT

## 2018-06-22 PROCEDURE — 94664 DEMO&/EVAL PT USE INHALER: CPT

## 2018-06-22 PROCEDURE — 25000003 PHARM REV CODE 250: Performed by: FAMILY MEDICINE

## 2018-06-22 PROCEDURE — 84100 ASSAY OF PHOSPHORUS: CPT

## 2018-06-22 PROCEDURE — 63600175 PHARM REV CODE 636 W HCPCS: Performed by: SURGERY

## 2018-06-22 PROCEDURE — 27000221 HC OXYGEN, UP TO 24 HOURS

## 2018-06-22 PROCEDURE — 63600175 PHARM REV CODE 636 W HCPCS: Performed by: STUDENT IN AN ORGANIZED HEALTH CARE EDUCATION/TRAINING PROGRAM

## 2018-06-22 PROCEDURE — 99900035 HC TECH TIME PER 15 MIN (STAT)

## 2018-06-22 PROCEDURE — 80053 COMPREHEN METABOLIC PANEL: CPT

## 2018-06-22 RX ORDER — POLYETHYLENE GLYCOL 3350 17 G/17G
17 POWDER, FOR SOLUTION ORAL DAILY
Status: DISCONTINUED | OUTPATIENT
Start: 2018-06-22 | End: 2018-06-23 | Stop reason: HOSPADM

## 2018-06-22 RX ADMIN — TRAMADOL HYDROCHLORIDE 50 MG: 50 TABLET, COATED ORAL at 05:06

## 2018-06-22 RX ADMIN — PREGABALIN 75 MG: 75 CAPSULE ORAL at 08:06

## 2018-06-22 RX ADMIN — METOCLOPRAMIDE 10 MG: 5 INJECTION, SOLUTION INTRAMUSCULAR; INTRAVENOUS at 05:06

## 2018-06-22 RX ADMIN — IRON SUCROSE 100 MG: 20 INJECTION, SOLUTION INTRAVENOUS at 08:06

## 2018-06-22 RX ADMIN — POTASSIUM PHOSPHATE, MONOBASIC AND POTASSIUM PHOSPHATE, DIBASIC 20 MMOL: 224; 236 INJECTION, SOLUTION, CONCENTRATE INTRAVENOUS at 11:06

## 2018-06-22 RX ADMIN — METOCLOPRAMIDE 10 MG: 5 INJECTION, SOLUTION INTRAMUSCULAR; INTRAVENOUS at 11:06

## 2018-06-22 RX ADMIN — ENOXAPARIN SODIUM 40 MG: 100 INJECTION SUBCUTANEOUS at 05:06

## 2018-06-22 NOTE — PROGRESS NOTES
[facility]/General [facility]  Progress Note    Admit Date: 6/18/2018  Hospital Day: 4  Procedure: 4 Days Post-Op s/p multiple liver resections, cholecystectomy w/ ventral hernia repair    SUBJECTIVE     Interval HPI:     No nausea over night. Pain controlled, with oral meds   Vitals stable. No gas. Good urine outpt  Has gotten out of bed and ambulating    Review of Systems  Constitutional: no fever or chills  Eyes: no visual changes  ENT: no nasal congestion or sore throat  Respiratory: no cough or shortness of breath  Cardiovascular: no chest pain or palpitations  Gastrointestinal: positive for abdominal pain and nausea  Genitourinary: no hematuria or dysuria  Integument/Breast: no rash or pruritis  Hematologic/Lymphatic: no easy bruising or lymphadenopathy  Musculoskeletal: no arthralgias or myalgias  Neurological: no seizures or tremors  Behavioral/Psych: no auditory or visual hallucinations  Endocrine: no heat or cold intolerance    OBJECTIVE     Vital Signs:  Temp:  [98.1 °F (36.7 °C)-99 °F (37.2 °C)] 98.1 °F (36.7 °C)  Pulse:  [67-83] 68  Resp:  [14-19] 14  SpO2:  [88 %-98 %] 97 %  BP: (133-142)/(86-99) 142/92    I & O (Last 24H):  06/21 0701 - 06/22 0700  In: -   Out: 900 [Urine:775; Drains:125]    Physical Exam:  NAD, conversant good mood  Rrr, hemodynamically stable  shallow breathing, clear, using IS  abd soft, tight, minimal pain, incision c/d/i  NGT with 425 out         Results:  Recent Labs      06/20/18   0421  06/21/18   0520  06/22/18   0504   WBC  6.83  6.40  5.41   HGB  10.7*  11.3*  10.8*   HCT  32.8*  34.6*  33.7*   PLT  121*  148*  142*   NA  139  138  136   K  3.7  3.7  3.8   CL  105  104  105   CO2  29  30*  25   BUN  17  15  14   CREATININE  1.1  1.0  0.9   BILITOT  1.3*  0.9  0.8   AST  101*  41*  34   ALT  230*  150*  107*   ALKPHOS  58  65  89   CALCIUM  8.3*  8.4*  8.2*   ALBUMIN  3.1*  3.0*  2.8*   PROT  5.7*  5.9*  5.8*   MG  2.0  2.0  2.0   PHOS  1.9*  1.9*  2.3*       No  results for input(s): PH, PCO2, PO2, HCO3, POCSATURATED, BE in the last 24 hours.    Scheduled Meds:   enoxparin  40 mg Subcutaneous Daily    HYDROmorphone  0.5 mg Intravenous Once    iron sucrose (VENOFER) IVPB  100 mg Intravenous Daily    metoclopramide HCl  10 mg Intravenous Q6H    pregabalin  75 mg Oral BID     Continuous Infusions:   dextrose 5 % and 0.45 % NaCl with KCl 20 mEq 75 mL/hr at 06/21/18 2331     PRN Meds:albuterol sulfate, diphenhydrAMINE, hydrALAZINE, ketorolac, labetalol, naloxone, ondansetron, senna-docusate 8.6-50 mg    ASSESSMENT     1. 4 Days Post-Op s/p liver resection with cholecystecomy and incisional hernia repair  2. Doing well post op, progressing    PLAN     Neurdoing well    Will start diet  Angela Harmon MD   [facility] -  [phone]

## 2018-06-22 NOTE — PLAN OF CARE
Problem: Patient Care Overview  Goal: Plan of Care Review  Outcome: Ongoing (interventions implemented as appropriate)  Pt in NAD. V/s stable. AAOx4. RIJ tlc dressing CDI. PIV infusing D5 1/2 NS with 20 mEq of K at 75 mL/hr dressing CDI. Continuous cardiac monitoring in place NSR. NPO ex meds. Midline to abd open to air edges approximate CDI. Pt reports mild pain PRN and scheduled meds given. Pt ambulates in room. Encouraged to call for assistance verbalized understanding. Safety maintained bed in low locked position, SR up X2, bed alarm on, and call bell in reach. Will continue to monitor.

## 2018-06-22 NOTE — PLAN OF CARE
Problem: Patient Care Overview  Goal: Plan of Care Review  Pt on documented O2. No apparent distress noted. Will continue to monitor.

## 2018-06-22 NOTE — PROGRESS NOTES
Central line removed by Adina REEVES, patient tolerated well, no complaints of pain or discomfort. Safety has been maintained, will continue to monitor.

## 2018-06-22 NOTE — PLAN OF CARE
Problem: Patient Care Overview  Goal: Plan of Care Review  Outcome: Ongoing (interventions implemented as appropriate)  Pt on RA with documented sats. No resp distress noted. Will continue to monitor.

## 2018-06-23 VITALS
RESPIRATION RATE: 18 BRPM | DIASTOLIC BLOOD PRESSURE: 90 MMHG | TEMPERATURE: 99 F | HEART RATE: 73 BPM | OXYGEN SATURATION: 94 % | SYSTOLIC BLOOD PRESSURE: 147 MMHG | WEIGHT: 234.13 LBS | HEIGHT: 71 IN | BODY MASS INDEX: 32.78 KG/M2

## 2018-06-23 LAB
ALBUMIN SERPL BCP-MCNC: 3.1 G/DL
ALP SERPL-CCNC: 105 U/L
ALT SERPL W/O P-5'-P-CCNC: 94 U/L
ANION GAP SERPL CALC-SCNC: 8 MMOL/L
AST SERPL-CCNC: 34 U/L
BASOPHILS # BLD AUTO: 0.01 K/UL
BASOPHILS NFR BLD: 0.2 %
BILIRUB SERPL-MCNC: 0.8 MG/DL
BUN SERPL-MCNC: 12 MG/DL
CALCIUM SERPL-MCNC: 8.9 MG/DL
CHLORIDE SERPL-SCNC: 104 MMOL/L
CO2 SERPL-SCNC: 25 MMOL/L
CREAT SERPL-MCNC: 1 MG/DL
DIFFERENTIAL METHOD: ABNORMAL
EOSINOPHIL # BLD AUTO: 0.2 K/UL
EOSINOPHIL NFR BLD: 2.3 %
ERYTHROCYTE [DISTWIDTH] IN BLOOD BY AUTOMATED COUNT: 13 %
EST. GFR  (AFRICAN AMERICAN): >60 ML/MIN/1.73 M^2
EST. GFR  (NON AFRICAN AMERICAN): >60 ML/MIN/1.73 M^2
GLUCOSE SERPL-MCNC: 128 MG/DL
HCT VFR BLD AUTO: 37.6 %
HGB BLD-MCNC: 12.6 G/DL
LYMPHOCYTES # BLD AUTO: 0.8 K/UL
LYMPHOCYTES NFR BLD: 12.3 %
MAGNESIUM SERPL-MCNC: 1.9 MG/DL
MCH RBC QN AUTO: 29.4 PG
MCHC RBC AUTO-ENTMCNC: 33.5 G/DL
MCV RBC AUTO: 88 FL
MONOCYTES # BLD AUTO: 0.5 K/UL
MONOCYTES NFR BLD: 7.9 %
NEUTROPHILS # BLD AUTO: 4.9 K/UL
NEUTROPHILS NFR BLD: 75.9 %
PHOSPHATE SERPL-MCNC: 2.9 MG/DL
PLATELET # BLD AUTO: 164 K/UL
PMV BLD AUTO: 9.5 FL
POTASSIUM SERPL-SCNC: 4.1 MMOL/L
PROT SERPL-MCNC: 6.3 G/DL
RBC # BLD AUTO: 4.29 M/UL
SODIUM SERPL-SCNC: 137 MMOL/L
WBC # BLD AUTO: 6.43 K/UL

## 2018-06-23 PROCEDURE — 99900035 HC TECH TIME PER 15 MIN (STAT)

## 2018-06-23 PROCEDURE — 83735 ASSAY OF MAGNESIUM: CPT

## 2018-06-23 PROCEDURE — 94761 N-INVAS EAR/PLS OXIMETRY MLT: CPT

## 2018-06-23 PROCEDURE — 85025 COMPLETE CBC W/AUTO DIFF WBC: CPT

## 2018-06-23 PROCEDURE — 94799 UNLISTED PULMONARY SVC/PX: CPT

## 2018-06-23 PROCEDURE — 63600175 PHARM REV CODE 636 W HCPCS: Performed by: SURGERY

## 2018-06-23 PROCEDURE — 84100 ASSAY OF PHOSPHORUS: CPT

## 2018-06-23 PROCEDURE — 25000003 PHARM REV CODE 250: Performed by: SURGERY

## 2018-06-23 PROCEDURE — 80053 COMPREHEN METABOLIC PANEL: CPT

## 2018-06-23 PROCEDURE — 94664 DEMO&/EVAL PT USE INHALER: CPT

## 2018-06-23 PROCEDURE — 36415 COLL VENOUS BLD VENIPUNCTURE: CPT

## 2018-06-23 RX ORDER — TRAMADOL HYDROCHLORIDE 50 MG/1
50 TABLET ORAL EVERY 6 HOURS PRN
Qty: 20 TABLET | Refills: 0 | Status: SHIPPED | OUTPATIENT
Start: 2018-06-23 | End: 2018-07-02

## 2018-06-23 RX ADMIN — METOCLOPRAMIDE 10 MG: 5 INJECTION, SOLUTION INTRAMUSCULAR; INTRAVENOUS at 05:06

## 2018-06-23 RX ADMIN — METOCLOPRAMIDE 10 MG: 5 INJECTION, SOLUTION INTRAMUSCULAR; INTRAVENOUS at 12:06

## 2018-06-23 RX ADMIN — METOCLOPRAMIDE 10 MG: 5 INJECTION, SOLUTION INTRAMUSCULAR; INTRAVENOUS at 11:06

## 2018-06-23 RX ADMIN — PREGABALIN 75 MG: 75 CAPSULE ORAL at 08:06

## 2018-06-23 NOTE — PLAN OF CARE
Discharge orders noted, no HH or HME ordered.    Future Appointments  Date Time Provider Department Center   10/8/2018 9:30 AM Jim Edwards MD [facility]       Pt's nurse will go over medications/signs and symptoms prior to discharge       06/23/18 1243   Final Note   Assessment Type Final Discharge Note   Discharge Disposition Home   Hospital Follow Up  Appt(s) scheduled? No  (Offices closed for Weekend, Patient to schedule own follow up appointment)   Right Care Referral Info   Post Acute Recommendation No Care     Julieta Vyas RN Transitional Navigator  [phone]

## 2018-06-23 NOTE — PROGRESS NOTES
Pts PCT stating pt has not voided during night. Stated she has asked him and he stated that he hasn't been. When asked again pt stated that he did X2 since PCT has been there, but it has been only 40 minutes. Pt additional is afraid to eat and drink. Drank maybe 40 mLs of bottled water.

## 2018-06-23 NOTE — PLAN OF CARE
Problem: Patient Care Overview  Goal: Plan of Care Review  Outcome: Ongoing (interventions implemented as appropriate)  Pts safety maintained. Meds given per MAR. No N/V, SOB, distress or complains of pain. Incision CDI, slight bruising. Vitals stable through the night

## 2018-06-27 ENCOUNTER — TELEPHONE (OUTPATIENT)
Dept: NEUROLOGY | Facility: HOSPITAL | Age: 53
End: 2018-06-27

## 2018-06-27 NOTE — TELEPHONE ENCOUNTER
----- Message from Gina Ng sent at 6/27/2018  2:21 PM CDT -----  Contact: Patient  JPB- Patient called and wanted to speak to nurse regarding the site is oazing. Broke out in rash as well around the site. Redness around site. Patient wants direction on how to approach this. Call back number [phone].

## 2018-06-28 RX ORDER — SULFAMETHOXAZOLE AND TRIMETHOPRIM 800; 160 MG/1; MG/1
1 TABLET ORAL 2 TIMES DAILY
Qty: 14 TABLET | Refills: 0 | Status: SHIPPED | OUTPATIENT
Start: 2018-06-28 | End: 2018-08-03

## 2018-06-28 NOTE — DISCHARGE SUMMARY
[facility]  Discharge Summary      Patient ID:  Omid Falcon  [id number]  52 y.o.  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 Admitted to the hospital for surgical resection.  Underwent  :     EXPLORATORY-LAPAROTOMY  RESECTION-HEPATIC SEGMENT VI  RESECTION HEPATIC SEGMENT VIII  THERMAL ABLATION 3 ADDITIONAL HEPATIC TUMORS SEGMENT VII  CHOLECYSTECTOMY  REPAIR-HERNIAS-INCISIONAL MULTIPLE -INITIAL with mesh VERSATEX  RETRORECTUS  ILENE NERVE BLOCK BILATERAL  LYSIS ADHESIONS  UNLISTED PROCEDURE ABDOMEN        Spent 2 days in the ICU and was transferred to the floor. Progressed well, diet was advanced in the usual fashion.  He was sent home POD 5.        Final Diagnoses:               Principal Problem: Secondary neuroendocrine tumor of liver              Secondary Diagnoses:         Active Hospital Problems     Diagnosis   POA    *Secondary neuroendocrine tumor of liver [C7B.8]   Yes    Carcinoid tumor [D3A.00]   Yes    Carcinoid syndrome [E34.0]   Yes    Secondary neuroendocrine tumor of distant lymph nodes  [C7B.8]   Yes       Resolved Hospital Problems     Diagnosis Date Resolved POA    Chronic cholecystitis [K81.1] 06/20/2018 Yes         Discharged Condition: good           Disposition: Home or Self Care     Follow Up/Patient Instructions:      Medications:  Reconciled Home Medications:            Medication List        START taking these medications    iron-vit c-b12-folic acid Tab  Commonly known as:  I-Car-C Plus  Take 1 tablet by mouth once daily.      traMADol 50 mg tablet  Commonly known as:  ULTRAM  Take 1 tablet (50 mg total) by mouth every 6 (six) hours as needed for Pain.          CONTINUE taking these medications    lanreotide 120 mg/0.5 mL Syrg  Commonly known as:  SOMATULINE DEPOT  Inject 120 mg into the skin every 28 days.                Discharge Procedure Orders      Diet Adult Regular   Order Comments: Take stool softner      Notify your health care provider if you experience any of the following:  temperature >100.4      Notify your health care provider if you experience any of the following:  persistent nausea and vomiting or diarrhea      Notify your health care provider if you experience any of the following:  severe uncontrolled pain      Notify your health care provider if you experience any of the following:  redness, tenderness, or signs of infection (pain, swelling, redness, odor or green/yellow discharge around incision site)      Notify your health care provider if you experience any of the following:  difficulty breathing or increased cough      Notify your health care provider if you experience any of the following:  severe persistent headache      Notify your health care provider if you experience any of the following:  worsening rash      Notify your health care provider if you experience any of the following:  persistent dizziness, light-headedness, or visual disturbances      Notify your health care provider if you experience any of the following:  increased confusion or weakness           No dressing needed   Order Comments: Ok to shower.  Keep wound clean and dry, no oils or creams lotions or scrubs.  Wash daily with soap and water.           Follow-up Information      J Pernell Gould MD In 2 weeks.    Specialty:  General Surgery  Why:  Offices closed for Weekend, Patient to schedule own follow up appointment  Contact information:  [address]  [place] [address]  [phone]                      Activity: activity as tolerated  Diet: regular diet  Wound Care: keep wound clean and dry and as directed     Follow-up with With Dr. Gould in 2-3 weeks.     Signed:  Damon Leblanc  6/28/2018  12:46 PM

## 2018-06-28 NOTE — PROGRESS NOTES
[facility]  Discharge Summary     Patient ID:  Omid Falcon  [id number]  52 y.o.  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 Admitted to the hospital for surgical resection.  Underwent  :    EXPLORATORY-LAPAROTOMY  RESECTION-HEPATIC SEGMENT VI  RESECTION HEPATIC SEGMENT VIII  THERMAL ABLATION 3 ADDITIONAL HEPATIC TUMORS SEGMENT VII  CHOLECYSTECTOMY  REPAIR-HERNIAS-INCISIONAL MULTIPLE -INITIAL with mesh VERSATEX  RETRORECTUS  ILENE NERVE BLOCK BILATERAL  LYSIS ADHESIONS  UNLISTED PROCEDURE ABDOMEN      Spent 2 days in the ICU and was transferred to the floor. Progressed well, diet was advanced in the usual fashion.  He was sent home POD 5.      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     traMADol 50 mg tablet  Commonly known as:  ULTRAM  Take 1 tablet (50 mg total) by mouth every 6 (six) hours as needed for Pain.        CONTINUE taking these medications    lanreotide 120 mg/0.5 mL Syrg  Commonly known as:  SOMATULINE DEPOT  Inject 120 mg into the skin every 28 days.            Discharge Procedure Orders  Diet Adult Regular   Order Comments: Take stool softner     Notify your health care provider if you experience any of the following:  temperature >100.4     Notify your health care provider if you experience any of the following:  persistent nausea and vomiting or diarrhea     Notify your health care provider if you experience any of the following:  severe uncontrolled pain     Notify your health care provider if you experience any of the following:  redness, tenderness, or signs of infection (pain, swelling, redness, odor or green/yellow discharge around incision site)     Notify your health care provider if you experience any of the following:  difficulty breathing or increased cough     Notify your health care provider if you experience any of the following:  severe persistent headache     Notify your health care provider if you experience any of the following:  worsening rash     Notify your health care provider if you experience any of the following:  persistent dizziness, light-headedness, or visual disturbances     Notify your health care provider if you experience any of the following:  increased confusion or weakness     No dressing needed   Order Comments: Ok to shower.  Keep wound clean and dry, no  oils or creams lotions or scrubs.  Wash daily with soap and water.       Follow-up Information     FRENCH Gould MD In 2 weeks.    Specialty:  General Surgery  Why:  Offices closed for Weekend, Patient to schedule own follow up appointment  Contact information:  [address] [place] [address]  [phone]                 Activity: activity as tolerated  Diet: regular diet  Wound Care: keep wound clean and dry and as directed    Follow-up with With Dr. Gould in 2-3 weeks.    Signed:  Damon Leblanc  6/28/2018  12:46 PM

## 2018-07-02 ENCOUNTER — OFFICE VISIT (OUTPATIENT)
Dept: NEUROLOGY | Facility: HOSPITAL | Age: 53
End: 2018-07-02
Attending: SURGERY
Payer: COMMERCIAL

## 2018-07-02 VITALS
DIASTOLIC BLOOD PRESSURE: 78 MMHG | WEIGHT: 229.06 LBS | TEMPERATURE: 98 F | BODY MASS INDEX: 32.79 KG/M2 | HEART RATE: 85 BPM | HEIGHT: 70 IN | SYSTOLIC BLOOD PRESSURE: 110 MMHG

## 2018-07-02 DIAGNOSIS — Z09 POSTOP CHECK: Primary | ICD-10-CM

## 2018-07-02 PROCEDURE — 99213 OFFICE O/P EST LOW 20 MIN: CPT | Performed by: SURGERY

## 2018-07-02 RX ORDER — CETIRIZINE HYDROCHLORIDE 10 MG/1
10 TABLET ORAL DAILY
Qty: 30 TABLET | Refills: 2 | COMMUNITY
Start: 2018-07-02 | End: 2018-08-03

## 2018-07-02 RX ORDER — HYDROCORTISONE 25 MG/G
CREAM TOPICAL 2 TIMES DAILY
Qty: 2 TUBE | Refills: 3 | Status: SHIPPED | OUTPATIENT
Start: 2018-07-02 | End: 2018-10-08

## 2018-07-02 NOTE — PROGRESS NOTES
Has incisiuonal rash and both sides of abdomen    Causing lot of itching    nio pain no fever  Sleeping well      Wound examined  No fluid collection    Stop bactrim    aleergic reaction to glue    Continue hydrocortisone cream, Zyrtec, bendryl

## 2018-07-12 ENCOUNTER — TELEPHONE (OUTPATIENT)
Dept: NEUROLOGY | Facility: HOSPITAL | Age: 53
End: 2018-07-12

## 2018-07-12 NOTE — TELEPHONE ENCOUNTER
----- Message from Gina Ng sent at 7/12/2018  1:25 PM CDT -----  Contact: Patient  JPB- Patient called wanting to speak to nurse about a follow up appt with Dr Gould. For a 6 week follow up appt. Call back number is [phone]

## 2018-07-12 NOTE — TELEPHONE ENCOUNTER
Returned pts call. F/u appt scheduled with Dr. Gould. Discussed 12 week restage as well, will coordinate scans at next visit.

## 2018-08-03 ENCOUNTER — OFFICE VISIT (OUTPATIENT)
Dept: NEUROLOGY | Facility: HOSPITAL | Age: 53
End: 2018-08-03
Attending: SURGERY
Payer: COMMERCIAL

## 2018-08-03 VITALS
WEIGHT: 230.63 LBS | TEMPERATURE: 98 F | HEIGHT: 70 IN | HEART RATE: 64 BPM | SYSTOLIC BLOOD PRESSURE: 113 MMHG | BODY MASS INDEX: 33.02 KG/M2 | DIASTOLIC BLOOD PRESSURE: 81 MMHG

## 2018-08-03 DIAGNOSIS — C7B.8 SECONDARY NEUROENDOCRINE TUMOR OF DISTANT LYMPH NODES: ICD-10-CM

## 2018-08-03 DIAGNOSIS — C7B.8 SECONDARY NEUROENDOCRINE TUMOR OF LIVER: ICD-10-CM

## 2018-08-03 DIAGNOSIS — K90.89 BILE SALT-INDUCED DIARRHEA: Primary | ICD-10-CM

## 2018-08-03 LAB
EXT 24 HR UR METANEPHRINE: NORMAL
EXT 24 HR UR NORMETANEPHRINE: NORMAL
EXT 24 HR UR NORMETANEPHRINE: NORMAL
EXT 25 HYDROXY VIT D2: NORMAL
EXT 25 HYDROXY VIT D3: NORMAL
EXT 5 HIAA 24 HR URINE: NORMAL
EXT 5 HIAA BLOOD: 11 NG/ML (ref 0–22)
EXT ACTH: NORMAL
EXT AFP: NORMAL
EXT ALBUMIN: 4.5 G/DL (ref 3.6–5.1)
EXT ALKALINE PHOSPHATASE: 68 U/L (ref 40–115)
EXT ALT: 19 U/L (ref 9–46)
EXT AMYLASE: NORMAL
EXT ANTI ISLET CELL AB: NORMAL
EXT ANTI PARIETAL CELL AB: NORMAL
EXT ANTI THYROID AB: NORMAL
EXT AST: 15 U/L (ref 10–35)
EXT BILIRUBIN DIRECT: NORMAL
EXT BILIRUBIN TOTAL: 0.7 MG/DL (ref 0.2–1.2)
EXT BK VIRUS DNA QN PCR: NORMAL
EXT BUN: 13 MG/DL (ref 7–25)
EXT C PEPTIDE: NORMAL
EXT CA 125: NORMAL
EXT CA 19-9: NORMAL
EXT CA 27-29: NORMAL
EXT CALCITONIN: NORMAL
EXT CALCIUM: 9.7 MG/DL (ref 8.6–10.3)
EXT CEA: NORMAL
EXT CHLORIDE: 108 MMOL/L (ref 98–110)
EXT CHOLESTEROL: NORMAL
EXT CHROMOGRANIN A: NORMAL
EXT CO2: 25 MMOL/L (ref 20–31)
EXT CREATININE UA: NORMAL
EXT CREATININE: 1.03 MG/DL (ref 0.7–1.33)
EXT CYCLOSPORONE LEVEL: NORMAL
EXT DOPAMINE: NORMAL
EXT EBV DNA BY PCR: NORMAL
EXT EPINEPHRINE: NORMAL
EXT FOLATE: NORMAL
EXT FREE T3: NORMAL
EXT FREE T4: NORMAL
EXT FSH: NORMAL
EXT GASTRIN RELEASING PEPTIDE: NORMAL
EXT GASTRIN RELEASING PEPTIDE: NORMAL
EXT GASTRIN: NORMAL
EXT GGT: NORMAL
EXT GHRELIN: NORMAL
EXT GLUCAGON: NORMAL
EXT GLUCOSE: 93 MG/DL (ref 65–99)
EXT GROWTH HORMONE: NORMAL
EXT HCV RNA QUANT PCR: NORMAL
EXT HDL: NORMAL
EXT HEMATOCRIT: 41.7 % (ref 38.5–50)
EXT HEMOGLOBIN A1C: NORMAL
EXT HEMOGLOBIN: 14.2 G/DL (ref 13.2–17.1)
EXT HISTAMINE 24 HR URINE: NORMAL
EXT HISTAMINE: NORMAL
EXT IGF-1: NORMAL
EXT IMMUNKNOW (NON-STIMULATED): NORMAL
EXT IMMUNKNOW (STIMULATED): NORMAL
EXT INR: NORMAL
EXT INSULIN: NORMAL
EXT LANREOTIDE LEVEL: 1956 PG/ML
EXT LDH, TOTAL: NORMAL
EXT LDL CHOLESTEROL: NORMAL
EXT LIPASE: NORMAL
EXT MAGNESIUM: NORMAL
EXT METANEPHRINE FREE PLASMA: NORMAL
EXT MOTILIN: NORMAL
EXT NEUROKININ A CAMB: NORMAL
EXT NEUROKININ A ISI: 16 PG/ML (ref 0–40)
EXT NEUROTENSIN: NORMAL
EXT NOREPINEPHRINE: NORMAL
EXT NORMETANEPHRINE: NORMAL
EXT NSE: NORMAL
EXT OCTREOTIDE LEVEL: NORMAL
EXT PANCREASTATIN CAMB: NORMAL
EXT PANCREASTATIN ISI: 86 PG/ML (ref 10–135)
EXT PANCREATIC POLYPEPTIDE: NORMAL
EXT PHOSPHORUS: NORMAL
EXT PLATELETS: 213 1000/UL (ref 140–400)
EXT POTASSIUM: 4 MMOL/L (ref 3.5–5.3)
EXT PROGRAF LEVEL: NORMAL
EXT PROLACTIN: NORMAL
EXT PROTEIN TOTAL: 7.7 G/DL (ref 6.1–8.1)
EXT PROTEIN UA: NORMAL
EXT PT: NORMAL
EXT PTH, INTACT: NORMAL
EXT PTT: NORMAL
EXT RAPAMUNE LEVEL: NORMAL
EXT SEROTONIN: 100 NG/ML (ref 56–244)
EXT SODIUM: 142 MMOL/L (ref 135–146)
EXT SOMATOSTATIN: NORMAL
EXT SUBSTANCE P: NORMAL
EXT TRIGLYCERIDES: NORMAL
EXT TRYPTASE: NORMAL
EXT TSH: NORMAL
EXT URIC ACID: NORMAL
EXT URINE AMYLASE U/HR: NORMAL
EXT URINE AMYLASE U/L: NORMAL
EXT VASOACTIVE INTESTINAL POLYPEPTIDE: NORMAL
EXT VITAMIN B12: NORMAL
EXT VMA 24 HR URINE: NORMAL
EXT WBC: 7.2 1000/UL (ref 3.8–10.8)
NEURON SPECIFIC ENOLASE: NORMAL

## 2018-08-03 PROCEDURE — 99213 OFFICE O/P EST LOW 20 MIN: CPT | Performed by: SURGERY

## 2018-08-03 NOTE — PROGRESS NOTES
"NOLANETS:  [facility]  A collaboration between [facility] and [facility]      PATIENT: Omid Falcon  MRN: [id number]  DATE: 8/3/2018    Subjective:      Chief Complaint: Post-op Evaluation (pt had surgery on June 18,2018)      Vitals:   Vitals:    08/03/18 0927   BP: 113/81   Pulse: 64   Temp: 97.8 °F (36.6 °C)   TempSrc: Oral   Weight: 104.6 kg (230 lb 9.6 oz)   Height: 5' 10" (1.778 m)        Karnofsky Score:       Diagnosis:   1. Bile salt-induced diarrhea    2. Secondary neuroendocrine tumor of liver    3. Secondary neuroendocrine tumor of distant lymph nodes         Oncologic History:     TI carcinoid  R colectomy  2/2016     Recurrence in liver now.  Lanreotide 120 mg Q month    Interval History: c/o watery diarrhea after every meal. No fevers. Back to  Work, had red rash from dermabond superglue. Resolved.    S/p 100% debulking KI 67 <1%.        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       Past Surgical History:  Past Surgical History:   Procedure Laterality Date    ABDOMINAL HERNIA REPAIR Bilateral 6/18/2018    Procedure: REPAIR-HERNIA-INCISIONAL-INITIAL with mesh;  Surgeon: NEEL Gould MD;  Location: [facility] OR;  Service: General;  Laterality: Bilateral;    BACK SURGERY      CHOLECYSTECTOMY N/A 6/18/2018    Procedure: CHOLECYSTECTOMY;  Surgeon: NEEL Gould MD;  Location: [facility] OR;  Service: General;  Laterality: N/A;    COLON SURGERY      EXPLORATORY LAPAROTOMY N/A 6/18/2018    Procedure: EXPLORATORY-LAPAROTOMY;  Surgeon: NEEL Gould MD;  Location: [facility] OR;  Service: General;  Laterality: N/A;    KNEE SURGERY      LIVER RESECTION N/A 6/18/2018    Procedure: RESECTION-HEPATIC;  Surgeon: NEEL" Pernell Gould MD;  Location: [facility] OR;  Service: General;  Laterality: N/A;       Family History:  History reviewed. No pertinent family history.    Allergies:  Review of patient's allergies indicates:   Allergen Reactions    Rocephin [ceftriaxone] Hives     Flushing feeling       Medications:  Current Outpatient Prescriptions   Medication Sig Dispense Refill    lanreotide (SOMATULINE DEPOT) 120 mg/0.5 mL Syrg Inject 120 mg into the skin every 28 days.      hydrocortisone 2.5 % cream Apply topically 2 (two) times daily. for 10 days 2 Tube 3     No current facility-administered medications for this visit.        Review of Systems   Constitutional: Negative.  Negative for appetite change, chills, fatigue, fever and unexpected weight change.   HENT: Negative.  Negative for congestion, hearing loss and sore throat.    Eyes: Negative.  Negative for pain, discharge and itching.   Respiratory: Negative.  Negative for cough, chest tightness, shortness of breath and wheezing.    Cardiovascular: Negative.  Negative for chest pain, palpitations and leg swelling.   Gastrointestinal: Positive for abdominal distention (hernias) and diarrhea (occaisional). Negative for abdominal pain, blood in stool, constipation, nausea and vomiting.   Endocrine: Negative.  Negative for cold intolerance, heat intolerance and polydipsia.   Genitourinary: Negative.  Negative for difficulty urinating, dysuria and flank pain.   Musculoskeletal: Negative.  Negative for arthralgias, joint swelling and myalgias.   Skin: Negative.  Negative for color change, pallor and rash.   Allergic/Immunologic: Negative.    Neurological: Negative.  Negative for dizziness, syncope and light-headedness.   Hematological: Negative.  Negative for adenopathy. Does not bruise/bleed easily.   Psychiatric/Behavioral: Negative.  Negative for agitation, confusion, dysphoric mood, hallucinations, sleep disturbance and suicidal ideas. The patient is not nervous/anxious.     All other systems reviewed and are negative.     Objective:      Physical Exam   Constitutional: He is oriented to person, place, and time. He appears well-developed and well-nourished. No distress.   HENT:   Head: Normocephalic and atraumatic.   Eyes: Conjunctivae and EOM are normal. Pupils are equal, round, and reactive to light. No scleral icterus.   Neck: Normal range of motion. Neck supple. No JVD present. No tracheal deviation present.   Cardiovascular: Normal rate, regular rhythm, normal heart sounds and intact distal pulses.    Pulmonary/Chest: Effort normal and breath sounds normal. No respiratory distress. He has no wheezes.   Abdominal: Soft. Bowel sounds are normal. He exhibits no distension and no mass. There is no tenderness. There is no rebound and no guarding. A hernia (incisional , upper abdomen) is present.   Musculoskeletal: Normal range of motion. He exhibits no edema or tenderness.   Neurological: He is alert and oriented to person, place, and time. He has normal reflexes.   Skin: Skin is warm and dry. No rash noted. He is not diaphoretic. No erythema. No pallor.   Psychiatric: He has a normal mood and affect. His behavior is normal. Thought content normal.   Nursing note and vitals reviewed.     Assessment:       1. Bile salt-induced diarrhea    2. Secondary neuroendocrine tumor of liver    3. Secondary neuroendocrine tumor of distant lymph nodes        Laboratory Data:    Neuroendocrine Labs Latest Ref Rng & Units 8/3/2018 7/2/2018 6/23/2018 6/22/2018 6/21/2018 6/20/2018 6/19/2018   EXT 5 HIAA BLOOD 0 - 22 ng/ml - - - - - - -   EXT SEROTONIN 56 - 244 ng/ml - - - - - - -   EXT CHROMOGRANIN A 25 - 140 ng/ml - - - - - - -   SEROTONIN <=230 ng/mL - - - - - - -   PANCREASTATIN 10 - 135 pg/mL - - - - - - -   EXT PANCREASTATIN KIM 10 - 135 pg/ml - - - - - - -   NEUROKININ A 0 - 40 pg/mL - - - - - - -   EXT NEUROKININ A KIM 0 - 40 pg/ml - - - - - - -   EXT LANREOTIDE LEVEL pg/ml - - - - - - -   WBC  3.90 - 12.70 K/uL - - 6.43 5.41 6.40 6.83 9.78   EXT WBC 3.8 - 10.8 1000/ul - - - - - - -   HGB 14.0 - 18.0 g/dL - - 12.6(L) 10.8(L) 11.3(L) 10.7(L) 11.3(L)   EXT HGB 13.2 - 17.1 g/dl - - - - - - -   HCT 40.0 - 54.0 % - - 37.6(L) 33.7(L) 34.6(L) 32.8(L) 34.4(L)   EXT HCT 38.5 - 50.0 % - - - - - - -   PLATLETS 150 - 350 K/uL - - 164 142(L) 148(L) 121(L) 139(L)   EXT PLATLETS 140 - 400 1000/ul - - - - - - -   PT 9.0 - 12.5 sec - - - - - - -   PTT 21.0 - 32.0 sec - - - - - - -   INR 0.8 - 1.2 - - - - - - -   GLUCOSE 70 - 110 mg/dL - - 128(H) 129(H) 141(H) 149(H) 168(H)   EXT GLUCOSE 65 - 99 mg/dl - - - - - - -   BUN 6 - 20 mg/dL - - 12 14 15 17 16   EXT BUN 7 - 25 mg/dl - - - - - - -   CREATININE 0.5 - 1.4 mg/dL - - 1.0 0.9 1.0 1.1 1.1   EXT CREATININE 0.70 - 1.33 mg/dl - - - - - - -    - 145 mmol/L - - 137 136 138 139 135(L)   EXT  - 146 mmol/L - - - - - - -   K 3.5 - 5.1 mmol/L - - 4.1 3.8 3.7 3.7 4.1   EXT K 3.5 - 5.3 mmol/l - - - - - - -   CHLORIDE 95 - 110 mmol/L - - 104 105 104 105 106   EXT CHLORIDE 98 - 110 mmol/l - - - - - - -   CO2 23 - 29 mmol/L - - 25 25 30(H) 29 23   EXT CO2 20 - 31 mmol/l - - - - - - -   CALCIUM 8.7 - 10.5 mg/dL - - 8.9 8.2(L) 8.4(L) 8.3(L) 8.5(L)   EXT CALCIUM 8.6 - 10.3 mg/dl - - - - - - -   PROTEIN, TOTAL 6.0 - 8.4 g/dL - - 6.3 5.8(L) 5.9(L) 5.7(L) 6.1   EXT PROTEIN, TOTAL 6.1 - 8.1 g/dl - - - - - - -   PHOSPHORUS 2.7 - 4.5 mg/dL - - 2.9 2.3(L) 1.9(L) 1.9(L) 2.6(L)   ALBUMIN 3.5 - 5.2 g/dL - - 3.1(L) 2.8(L) 3.0(L) 3.1(L) 3.5   EXT ALBUMIN 3.6 - 5.1 g/dl - - - - - - -   TOTAL BILIRUBIN 0.1 - 1.0 mg/dL - - 0.8 0.8 0.9 1.3(H) 1.3(H)   EXT TOTAL BILIRUBIN 0.2 - 1.2 mg/dl - - - - - - -   ALK PHOSPHATASE 55 - 135 U/L - - 105 89 65 58 48(L)   EXT ALK PHOSPHATASE 40 - 115 u/l - - - - - - -   SGOT (AST) 10 - 40 U/L - - 34 34 41(H) 101(H) 212(H)   EXT SGOT (AST) 10 - 35 u/l - - - - - - -   SGPT (ALT) 10 - 44 U/L - - 94(H) 107(H) 150(H) 230(H) 300(H)   EXT ALT 9 - 46 u/l - - - - - - -    SERUM AMYLASE 30 - 110 U/L - - - - - - -   MG 1.6 - 2.6 mg/dL - - 1.9 2.0 2.0 2.0 2.1   Weight - 230 lbs 10 oz 229 lbs 1 oz 234 lbs 2 oz - - - -   [place] [facility] [place]OLOGIC DIAGNOSIS  MESENTERIC MASS, BIOPSY (REVIEW OF 10 OUTSIDE SLIDES LABELED [id number]):  Well-differentiated neuroendocrine tumor, low grade (WHO Grade 1)  NET PANEL MESENTERIC MASS: BLOCK [id number] (Immunostains performed with appropriate positive and  negative controls):  -- Mitoses: less than 1 per 10hpf  -- Necrosis: Not identified  -- Ki-67: Positive; less than 1% cells staining  -- Chromogranin: Positive (intensity 2+; 90% cells)  -- Synaptophysin: Positive (intensity 2+; 90% cells)  -- CD31: Positive; 25 vessels per HPF                     Impression:  Bile salt diarrhea.  100% debulked. KI 67 1%    Plan:       Cholestyramine 1/2 packet AC  If no improvement, add Zenpep iii w meals, ii w snacks.  RTC EW per schedule.      NEEL Gould MD, FACS  Professor of Surgery, [facility]  Neuroendocrine Surgery, Hepatic/Pancreatic & General Surgery  [address]  [place]  [address]  ph. [phone]; [phone]  fax. [phone]

## 2018-08-03 NOTE — PATIENT INSTRUCTIONS
Follow up with Dr. Edwards, scheduled on 10/8/18.    Complete CT, Mri, Echo prior to appt, scheduled on September 28, 2018      MRI at 915am  CT at 1015am  Echo at 11am    Pt instructed to arrive 30 minutes prior to procedure at 845am.

## 2018-09-28 ENCOUNTER — HOSPITAL ENCOUNTER (OUTPATIENT)
Dept: CARDIOLOGY | Facility: HOSPITAL | Age: 53
Discharge: HOME | End: 2018-09-28
Attending: SURGERY
Payer: COMMERCIAL

## 2018-09-28 ENCOUNTER — HOSPITAL ENCOUNTER (OUTPATIENT)
Dept: RADIOLOGY | Facility: HOSPITAL | Age: 53
Discharge: HOME | End: 2018-09-28
Attending: SURGERY
Payer: COMMERCIAL

## 2018-09-28 DIAGNOSIS — C7B.8 SECONDARY NEUROENDOCRINE TUMOR OF LIVER: ICD-10-CM

## 2018-09-28 DIAGNOSIS — D49.89 NEOPLASM OF ABDOMEN: ICD-10-CM

## 2018-09-28 DIAGNOSIS — C7A.012 MALIGNANT CARCINOID TUMOR OF THE ILEUM: ICD-10-CM

## 2018-09-28 DIAGNOSIS — C7B.8 SECONDARY NEUROENDOCRINE TUMOR OF DISTANT LYMPH NODES: ICD-10-CM

## 2018-09-28 DIAGNOSIS — C7A.012 MALIGNANT CARCINOID TUMOR OF ILEUM: ICD-10-CM

## 2018-09-28 DIAGNOSIS — C7A.012 MALIGNANT CARCINOID TUMOR OF THE ILEUM: Primary | ICD-10-CM

## 2018-09-28 LAB
DIASTOLIC DYSFUNCTION: NO
ESTIMATED PA SYSTOLIC PRESSURE: 19
GLOBAL PERICARDIAL EFFUSION: NORMAL
RETIRED EF AND QEF - SEE NOTES: 55 (ref 55–65)
TRICUSPID VALVE REGURGITATION: NORMAL

## 2018-09-28 PROCEDURE — 25500020 PHARM REV CODE 255: Performed by: SURGERY

## 2018-09-28 PROCEDURE — 93306 TTE W/DOPPLER COMPLETE: CPT | Mod: 26,,, | Performed by: INTERNAL MEDICINE

## 2018-09-28 PROCEDURE — 74183 MRI ABD W/O CNTR FLWD CNTR: CPT | Mod: TC

## 2018-09-28 PROCEDURE — 74177 CT ABD & PELVIS W/CONTRAST: CPT | Mod: TC

## 2018-09-28 PROCEDURE — 74177 CT ABD & PELVIS W/CONTRAST: CPT | Mod: 26,,, | Performed by: RADIOLOGY

## 2018-09-28 PROCEDURE — 74183 MRI ABD W/O CNTR FLWD CNTR: CPT | Mod: 26,,, | Performed by: RADIOLOGY

## 2018-09-28 PROCEDURE — 93306 TTE W/DOPPLER COMPLETE: CPT

## 2018-09-28 PROCEDURE — A9585 GADOBUTROL INJECTION: HCPCS | Performed by: SURGERY

## 2018-09-28 RX ORDER — GADOBUTROL 604.72 MG/ML
10 INJECTION INTRAVENOUS
Status: COMPLETED | OUTPATIENT
Start: 2018-09-28 | End: 2018-09-28

## 2018-09-28 RX ADMIN — IOHEXOL 100 ML: 350 INJECTION, SOLUTION INTRAVENOUS at 11:09

## 2018-09-28 RX ADMIN — IOHEXOL 30 ML: 350 INJECTION, SOLUTION INTRAVENOUS at 10:09

## 2018-09-28 RX ADMIN — GADOBUTROL 10 ML: 604.72 INJECTION INTRAVENOUS at 10:09

## 2018-10-08 ENCOUNTER — OFFICE VISIT (OUTPATIENT)
Dept: NEUROLOGY | Facility: HOSPITAL | Age: 53
End: 2018-10-08
Attending: SURGERY
Payer: COMMERCIAL

## 2018-10-08 VITALS
DIASTOLIC BLOOD PRESSURE: 93 MMHG | TEMPERATURE: 98 F | BODY MASS INDEX: 33.46 KG/M2 | HEART RATE: 52 BPM | RESPIRATION RATE: 16 BRPM | HEIGHT: 70 IN | WEIGHT: 233.69 LBS | SYSTOLIC BLOOD PRESSURE: 134 MMHG

## 2018-10-08 DIAGNOSIS — C78.7 SECONDARY MALIGNANT NEOPLASM OF LIVER AND INTRAHEPATIC BILE DUCT: ICD-10-CM

## 2018-10-08 DIAGNOSIS — C7B.8 SECONDARY NEUROENDOCRINE TUMOR OF DISTANT LYMPH NODES: ICD-10-CM

## 2018-10-08 DIAGNOSIS — D49.89 NEOPLASM OF ABDOMEN: ICD-10-CM

## 2018-10-08 DIAGNOSIS — C7A.012 MALIGNANT CARCINOID TUMOR OF ILEUM: Primary | ICD-10-CM

## 2018-10-08 DIAGNOSIS — E34.0 CARCINOID SYNDROME: ICD-10-CM

## 2018-10-08 PROCEDURE — 99214 OFFICE O/P EST MOD 30 MIN: CPT | Performed by: SURGERY

## 2018-10-08 RX ORDER — CHOLESTYRAMINE 4 G/9G
4 POWDER, FOR SUSPENSION ORAL
COMMUNITY
End: 2018-11-01 | Stop reason: SDUPTHER

## 2018-10-08 NOTE — LETTER
October 8, 2018        Negro Pfeiffer MD  [address]  [facility] [address]       NOLANETS: [facility]  A collaboration between [facility] and [facility] [address]  [address]  [place] [address]  Phone: [phone]  Fax: [phone]        NEEL Gould M.D., FACS  Yeison Mcgill M.D.  Houston Aquino M.D.   Joan Miller M.D., FACS  DO Jim Wyatt M.D., FACS   Patient: Omid Falcon   MR Number: [id number]   YOB: 1965   Date of Visit: 10/8/2018     Dear Dr. Pfeiffre    Thank you for the kind referral of Omid Falcon. We saw this patient on 10/8/2018, and a copy of our clinic note is enclosed. We certainly appreciate the opportunity to participate in your patient's care.     If you have any questions or wish to discuss your patient further, please do not hesitate to contact us at [phone].       Kindest personal regards,          Jim Edwards MD, FACS  The Sebasti[employer] Mcintyre Professor of Surgery & Neurosciences  [facility], Dept. Of Surgery  [address] [place] [address], [address]  [place] [address] [phone]

## 2018-10-08 NOTE — PATIENT INSTRUCTIONS
Blood work / Lab work / Tumor markers every 3 mos.  Get lab work drawn at least 1 month prior to appointment. --- due November 2018 and February 2019    Scans:  CT Abd/Pelvis and MRI liver in 6 mos.  ---  March 2019  Call Scheduling Department at [phone] to schedule scans prior to next appointment:      Return clinic appointment in 6 months with Dr. Edwards - appointment made    Echo every year - due September 2019

## 2018-10-08 NOTE — PROGRESS NOTES
"NOLANETS:  [facility]  A collaboration between [facility] and [facility]    PATIENT: Omid Falcon  MRN: [id number]  DATE: 10/8/2018    Vitals:    10/08/18 0924   BP: (!) 134/93   BP Location: Right arm   Pulse: (!) 52   Resp: 16   Temp: 97.6 °F (36.4 °C)   TempSrc: Oral   Weight: 106 kg (233 lb 11 oz)   Height: 5' 10" (1.778 m)      Last 2 Weight Measurements:   Wt Readings from Last 2 Encounters:   10/08/18 106 kg (233 lb 11 oz)   08/03/18 104.6 kg (230 lb 9.6 oz)     BMI: Body mass index is 33.53 kg/m².    Karnofsky Score    Karnofsky Score:  90% - Able to Carry on Normal Activity: Minor Symptoms of Disease       Diagnosis:   1. Malignant carcinoid tumor of ileum    2. Secondary neuroendocrine tumor of distant lymph nodes    3. Secondary malignant neoplasm of liver and intrahepatic bile duct    4. Carcinoid syndrome    5. Neoplasm of abdomen      Interval History: Mr. 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/2016       Past Surgical History:   Procedure Laterality Date    ABDOMINAL HERNIA REPAIR Bilateral 6/18/2018    Procedure: REPAIR-HERNIA-INCISIONAL-INITIAL with mesh;  Surgeon: NEEL Gould MD;  Location: [facility] OR;  Service: General;  Laterality: Bilateral;    BACK SURGERY      CHOLECYSTECTOMY N/A 6/18/2018    Procedure: CHOLECYSTECTOMY;  Surgeon: NEEL Gould MD;  Location: [facility] OR;  Service: General;  Laterality: N/A;    CHOLECYSTECTOMY N/A 6/18/2018    Performed by NEEL Gould MD at [facility] OR    COLON SURGERY      CYSTOSCOPY WITH RETROGRADE PYELOGRAM Right 12/11/2015    Performed by FABI" Jaime Winn MD at [facility] OR    EXPLORATORY LAPAROTOMY N/A 6/18/2018    Procedure: EXPLORATORY-LAPAROTOMY;  Surgeon: NEEL Gould MD;  Location: [facility] OR;  Service: General;  Laterality: N/A;    EXPLORATORY-LAPAROTOMY N/A 6/18/2018    Performed by NEEL Gould MD at [facility] OR    KNEE SURGERY      LITHOTRIPSY-LASER Right 12/11/2015    Performed by FABI Winn MD at [facility] OR    LIVER RESECTION N/A 6/18/2018    Procedure: RESECTION-HEPATIC;  Surgeon: NEEL Gould MD;  Location: [facility] OR;  Service: General;  Laterality: N/A;    REPAIR-HERNIA-INCISIONAL-INITIAL with mesh Bilateral 6/18/2018    Performed by NEEL Gould MD at [facility] OR    RESECTION-HEPATIC N/A 6/18/2018    Performed by NEEL Gould MD at [facility] OR    URETEROSCOPY Right 12/11/2015    Performed by FABI Winn MD at [facility] OR       Review of patient's allergies indicates:   Allergen Reactions    Rocephin [ceftriaxone] Hives, Itching and Other (See Comments)     Coughing and rhinitis  Flushing feeling    Dermabond [tissue glues] Hives    Epinephrine        Current Outpatient Medications   Medication Sig Dispense Refill    cholestyramine (QUESTRAN) 4 gram packet Take 4 g by mouth 3 (three) times daily with meals. 1/2 pack TID      lanreotide (SOMATULINE DEPOT) 120 mg/0.5 mL Syrg Inject 120 mg into the skin every 28 days.       No current facility-administered medications for this visit.        Review of Systems     Number of Days per Week Number per Day   DIARRHEA 0    BRISTOL STOOL SCALE RATING     FLUSHING 0    WHEEZING 0      WEIGHT GAIN/LOSS    ENERGY RATING (0-10)         Physical Exam: deferred.     Pathology Data:  SPECIMEN  1) Gallbladder.  2) Liver resection segment #8.  3) Liver resection segment #6.  4) Hernia sac #1.  5) Hernia sac #2.  6) Hernia sac #3.  FINAL PATHOLOGIC DIAGNOSIS  1. Gallbladder, cholecystectomy:  -Chronic cholecystitis.  2. Liver, resection segment eight:  -Metastatic  "well-differentiated neuroendocrine tumor, grade 1.  -Mitoses: <1 per 10 HPF.  -Necrosis: Not identified.  -Ki-67 proliferation index: <1%.  -CD31: Positive, 27 vessels per hpf.  -Chromogranin: Positive (2+) staining in 90% of cells.  -Synaptophysin: Positive (2+) staining in 90% of cells.  All immunohistochemical stains have satisfactory positive and negative controls.  3. Liver, resection segment six:  -Metastatic well-differentiated neuroendocrine tumor, grade 1.  4. Soft tissue, submitted as "hernia sac #1", excision:  -Benign fibroadipose tissue compatible with hernia sac.            Laboratory Data:  Neuroendocrine Labs Latest Ref Rng & Units 8/3/2018   EXT 5 HIAA BLOOD 0 - 22 ng/ml 11   EXT SEROTONIN 56 - 244 ng/ml 100   EXT CHROMOGRANIN A 25 - 140 ng/ml    SEROTONIN <=230 ng/mL    PANCREASTATIN 10 - 135 pg/mL    EXT PANCREASTATIN KIM 10 - 135 pg/ml 86   NEUROKININ A 0 - 40 pg/mL    EXT NEUROKININ A KIM 0 - 40 pg/ml 16   EXT LANREOTIDE LEVEL pg/ml 1,956   WBC 3.90 - 12.70 K/uL    EXT WBC 3.8 - 10.8 1000/ul 7.2   HGB 14.0 - 18.0 g/dL    EXT HGB 13.2 - 17.1 g/dl 14.2   HCT 40.0 - 54.0 %    EXT HCT 38.5 - 50.0 % 41.7   PLATLETS 150 - 350 K/uL    EXT PLATLETS 140 - 400 1000/ul 213   PT 9.0 - 12.5 sec    PTT 21.0 - 32.0 sec    INR 0.8 - 1.2    GLUCOSE 70 - 110 mg/dL    EXT GLUCOSE 65 - 99 mg/dl 93   BUN 6 - 20 mg/dL    EXT BUN 7 - 25 mg/dl 13   CREATININE 0.5 - 1.4 mg/dL    EXT CREATININE 0.70 - 1.33 mg/dl 1.03    - 145 mmol/L    EXT  - 146 mmol/L 142   K 3.5 - 5.1 mmol/L    EXT K 3.5 - 5.3 mmol/l 4.0   CHLORIDE 95 - 110 mmol/L    EXT CHLORIDE 98 - 110 mmol/l 108   CO2 23 - 29 mmol/L    EXT CO2 20 - 31 mmol/l 25   CALCIUM 8.7 - 10.5 mg/dL    EXT CALCIUM 8.6 - 10.3 mg/dl 9.7   PROTEIN, TOTAL 6.0 - 8.4 g/dL    EXT PROTEIN, TOTAL 6.1 - 8.1 g/dl 7.7   PHOSPHORUS 2.7 - 4.5 mg/dL    ALBUMIN 3.5 - 5.2 g/dL    EXT ALBUMIN 3.6 - 5.1 g/dl 4.5   TOTAL BILIRUBIN 0.1 - 1.0 mg/dL    EXT TOTAL BILIRUBIN 0.2 - 1.2 " mg/dl 0.7   ALK PHOSPHATASE 55 - 135 U/L    EXT ALK PHOSPHATASE 40 - 115 u/l 68   SGOT (AST) 10 - 40 U/L    EXT SGOT (AST) 10 - 35 u/l 15   SGPT (ALT) 10 - 44 U/L    EXT ALT 9 - 46 u/l 19   SERUM AMYLASE 30 - 110 U/L    MG 1.6 - 2.6 mg/dL    Weight           Radiology Data:  FINDINGS:  There is stable 6 mm left lower lobe nodule (series 3, image 15.  No pleural effusion.    Abdomen and pelvis:    Treated lesions with metallic clips in segment VIII and segment V. Hypervascular focus within segment 4 B (series 3, image 21), and more conspicuous on same day MRI.  The liver is diffusely steatotic.  Hepatic vasculature is patent.    The spleen, pancreas, adrenal glands, and bilateral kidneys (aside from bilateral renal cysts) are unremarkable.  The gallbladder is surgically absent.  Postsurgical change of the proximal colon.  Stomach and small bowel are normal in caliber.  The urinary bladder is unremarkable.  There is no free fluid or adenopathy.  There is ventral midline abdominal scarring.  Lumbosacral fixation hardware noted.  No aggressive osseous lesion.    RECIST SUMMARY:    Date of prior examination for comparison: CT dated 02/09/2018    Lesion 1: Hepatic segment VIII treated with metallic clip present.  Prior measurement 1.3 cm    Lesion 2: Tip of the right lobe of the liver treated with metallic clip present.  Prior measurement 1.0 cm.      Impression       Post treatment changes as above.  Additional hypervascular focus within segment 4B is better demonstrated on same day MRI, technically indeterminate.     FINDINGS:  Treated liver lesion within the segment 8 and segment 5.  Subcentimeter area of arterial enhancement segment 4B  , series 12, image 49 not identified on any other sequences, follow-up advised.    Hepatic steatosis.  The biliary ducts are not dilated.  The gallbladder has been removed.  The pancreas, spleen, adrenal glands and bilateral kidneys appear normal.  Bilateral small benign kidney  cysts.The aorta tapers normally, no para-aortic lymphadenopathy. The mesentery appears normal. Postoperative changes of the lower lumbar spine.      Impression       Treated liver lesions.  There is a  subcentimeter lesion within the segment 4B seen only on the immediate arterial postcontrast image, not seen on any other images.  Follow-up advised.    Cholecystectomy.    RECIST SUMMARY:    Date of prior examination for comparison: 02/09/2018    Lesion 1: Hepatic segment 8.  Has been treated, nonenhancing.  Prior measurement 1.3 cm.    Lesion 2: Tip of the right hepatic lobe.  Has been treated, nonenhancing.  Prior measurement .  1.0 cm.           Impression:  1.  stable post cytoreduction of liver       Plan:restage in 6 months with ct and mri       Labs: Markers: 3 month intervals             Scans: 6 month intervals    Echocardiogram: 12 month intervals    Return to Clinic:6 month intervals    Orders placed this visit:   Orders Placed This Encounter   Procedures    CT Abdomen Pelvis With Contrast    MRI Abdomen W WO Contrast    5-HIAA Plasma (Neuoendocrine)    Serotonin serum    Pancreastatin    Neurokinin A    Lanreotide Drug Levels    Comprehensive metabolic panel    CBC auto differential    2D echo with color flow doppler        25 Minutes Face-to-Face; Counseling/Coordination of Care > 50 percent of Visit     Jim Edwards MD, FACS  The Sebastián Mcintyre Professor of Surgery, [facility]  [address]  [place]  [address]  Cell [phone]  ph. [phone]; [phone]  fax. [phone]  [email or web address]

## 2018-11-01 DIAGNOSIS — C78.7 SECONDARY MALIGNANT NEOPLASM OF LIVER: ICD-10-CM

## 2018-11-01 DIAGNOSIS — C7B.8 SECONDARY NEUROENDOCRINE TUMOR OF DISTANT LYMPH NODES: ICD-10-CM

## 2018-11-01 DIAGNOSIS — C7A.012 MALIGNANT CARCINOID TUMOR OF THE ILEUM: Primary | ICD-10-CM

## 2018-11-01 NOTE — TELEPHONE ENCOUNTER
Spoke to patient. Put in a refill for patients cholestyramine packets and routed to Dr. Edwards for a signature. Patient verbalized his understanding and has no further questions at this time.

## 2018-11-02 RX ORDER — CHOLESTYRAMINE 4 G/9G
4 POWDER, FOR SUSPENSION ORAL
Qty: 60 PACKET | Refills: 11 | Status: SHIPPED | OUTPATIENT
Start: 2018-11-02 | End: 2019-12-02 | Stop reason: SDUPTHER

## 2018-12-01 PROBLEM — R07.9 CHEST PAIN: Status: ACTIVE | Noted: 2018-12-01

## 2018-12-01 PROBLEM — R07.9 CHEST PAIN: Status: RESOLVED | Noted: 2018-12-01 | Resolved: 2018-12-01

## 2018-12-01 PROBLEM — R07.2 PRECORDIAL PAIN: Status: ACTIVE | Noted: 2018-12-01

## 2018-12-01 PROBLEM — E66.9 OBESITY (BMI 30.0-34.9): Status: ACTIVE | Noted: 2018-12-01

## 2018-12-01 PROBLEM — E66.811 OBESITY (BMI 30.0-34.9): Status: ACTIVE | Noted: 2018-12-01

## 2019-02-15 LAB
EXT 24 HR UR METANEPHRINE: ABNORMAL
EXT 24 HR UR NORMETANEPHRINE: ABNORMAL
EXT 24 HR UR NORMETANEPHRINE: ABNORMAL
EXT 25 HYDROXY VIT D2: ABNORMAL
EXT 25 HYDROXY VIT D3: ABNORMAL
EXT 5 HIAA 24 HR URINE: ABNORMAL
EXT 5 HIAA BLOOD: 10 NG/ML (ref 0–22)
EXT ACTH: ABNORMAL
EXT AFP: ABNORMAL
EXT ALBUMIN: 4.3 G/DL (ref 3.6–5.1)
EXT ALKALINE PHOSPHATASE: 60 U/L (ref 40–115)
EXT ALT: 30 U/L (ref 9–46)
EXT AMYLASE: ABNORMAL
EXT ANTI ISLET CELL AB: ABNORMAL
EXT ANTI PARIETAL CELL AB: ABNORMAL
EXT ANTI THYROID AB: ABNORMAL
EXT AST: 21 U/L (ref 10–35)
EXT BILIRUBIN DIRECT: ABNORMAL
EXT BILIRUBIN TOTAL: 0.5 MG/DL (ref 0.2–1.2)
EXT BK VIRUS DNA QN PCR: ABNORMAL
EXT BUN: 18 MG/DL (ref 7–25)
EXT C PEPTIDE: ABNORMAL
EXT CA 125: ABNORMAL
EXT CA 19-9: ABNORMAL
EXT CA 27-29: ABNORMAL
EXT CALCITONIN: ABNORMAL
EXT CALCIUM: 9.4 MG/DL (ref 8.6–10.3)
EXT CEA: ABNORMAL
EXT CHLORIDE: 105 MMOL/L (ref 98–110)
EXT CHOLESTEROL: ABNORMAL
EXT CHROMOGRANIN A: ABNORMAL
EXT CO2: 27 MMOL/L (ref 20–32)
EXT CREATININE UA: ABNORMAL
EXT CREATININE: 1.14 MG/DL (ref 0.7–1.33)
EXT CYCLOSPORONE LEVEL: ABNORMAL
EXT DOPAMINE: ABNORMAL
EXT EBV DNA BY PCR: ABNORMAL
EXT EPINEPHRINE: ABNORMAL
EXT FOLATE: ABNORMAL
EXT FREE T3: ABNORMAL
EXT FREE T4: ABNORMAL
EXT FSH: ABNORMAL
EXT GASTRIN RELEASING PEPTIDE: ABNORMAL
EXT GASTRIN RELEASING PEPTIDE: ABNORMAL
EXT GASTRIN: ABNORMAL
EXT GGT: ABNORMAL
EXT GHRELIN: ABNORMAL
EXT GLUCAGON: ABNORMAL
EXT GLUCOSE: 149 MG/DL (ref 65–139)
EXT GROWTH HORMONE: ABNORMAL
EXT HCV RNA QUANT PCR: ABNORMAL
EXT HDL: ABNORMAL
EXT HEMATOCRIT: 41.9 % (ref 5–50)
EXT HEMOGLOBIN A1C: ABNORMAL
EXT HEMOGLOBIN: 14.3 G/DL (ref 13.2–17.1)
EXT HISTAMINE 24 HR URINE: ABNORMAL
EXT HISTAMINE: ABNORMAL
EXT IGF-1: ABNORMAL
EXT IMMUNKNOW (NON-STIMULATED): ABNORMAL
EXT IMMUNKNOW (STIMULATED): ABNORMAL
EXT INR: ABNORMAL
EXT INSULIN: ABNORMAL
EXT LANREOTIDE LEVEL: 5636 PG/ML
EXT LDH, TOTAL: ABNORMAL
EXT LDL CHOLESTEROL: ABNORMAL
EXT LIPASE: ABNORMAL
EXT MAGNESIUM: ABNORMAL
EXT METANEPHRINE FREE PLASMA: ABNORMAL
EXT MOTILIN: ABNORMAL
EXT NEUROKININ A CAMB: ABNORMAL
EXT NEUROKININ A ISI: 10 PG/ML (ref 0–40)
EXT NEUROTENSIN: ABNORMAL
EXT NOREPINEPHRINE: ABNORMAL
EXT NORMETANEPHRINE: ABNORMAL
EXT NSE: ABNORMAL
EXT OCTREOTIDE LEVEL: ABNORMAL
EXT PANCREASTATIN CAMB: ABNORMAL
EXT PANCREASTATIN ISI: 68 PG/ML (ref 10–135)
EXT PANCREATIC POLYPEPTIDE: ABNORMAL
EXT PHOSPHORUS: ABNORMAL
EXT PLATELETS: 175 1000/UL (ref 140–400)
EXT POTASSIUM: 4.1 MMOL/L (ref 3.5–5.3)
EXT PROGRAF LEVEL: ABNORMAL
EXT PROLACTIN: ABNORMAL
EXT PROTEIN TOTAL: 7 G/DL (ref 6.1–8.1)
EXT PROTEIN UA: ABNORMAL
EXT PT: ABNORMAL
EXT PTH, INTACT: ABNORMAL
EXT PTT: ABNORMAL
EXT RAPAMUNE LEVEL: ABNORMAL
EXT SEROTONIN: 95 NG/ML (ref 56–244)
EXT SODIUM: 140 MMOL/L (ref 135–146)
EXT SOMATOSTATIN: ABNORMAL
EXT SUBSTANCE P: ABNORMAL
EXT TRIGLYCERIDES: ABNORMAL
EXT TRYPTASE: ABNORMAL
EXT TSH: ABNORMAL
EXT URIC ACID: ABNORMAL
EXT URINE AMYLASE U/HR: ABNORMAL
EXT URINE AMYLASE U/L: ABNORMAL
EXT VASOACTIVE INTESTINAL POLYPEPTIDE: ABNORMAL
EXT VITAMIN B12: ABNORMAL
EXT VMA 24 HR URINE: ABNORMAL
EXT WBC: 4.6 1000/UL (ref 3.8–10.8)
NEURON SPECIFIC ENOLASE: ABNORMAL

## 2019-03-06 ENCOUNTER — HOSPITAL ENCOUNTER (OUTPATIENT)
Dept: RADIOLOGY | Facility: HOSPITAL | Age: 54
Discharge: HOME | End: 2019-03-06
Attending: SURGERY
Payer: COMMERCIAL

## 2019-03-06 DIAGNOSIS — D49.89 NEOPLASM OF ABDOMEN: ICD-10-CM

## 2019-03-06 PROCEDURE — A9585 GADOBUTROL INJECTION: HCPCS | Performed by: SURGERY

## 2019-03-06 PROCEDURE — 74177 CT ABDOMEN PELVIS WITH CONTRAST: ICD-10-PCS | Mod: 26,,, | Performed by: RADIOLOGY

## 2019-03-06 PROCEDURE — 74177 CT ABD & PELVIS W/CONTRAST: CPT | Mod: 26,,, | Performed by: RADIOLOGY

## 2019-03-06 PROCEDURE — 74183 MRI ABD W/O CNTR FLWD CNTR: CPT | Mod: TC

## 2019-03-06 PROCEDURE — 74177 CT ABD & PELVIS W/CONTRAST: CPT | Mod: TC

## 2019-03-06 PROCEDURE — 74183 MRI ABD W/O CNTR FLWD CNTR: CPT | Mod: 26,,, | Performed by: RADIOLOGY

## 2019-03-06 PROCEDURE — 25500020 PHARM REV CODE 255: Performed by: SURGERY

## 2019-03-06 PROCEDURE — 74183 MRI ABDOMEN W WO CONTRAST: ICD-10-PCS | Mod: 26,,, | Performed by: RADIOLOGY

## 2019-03-06 RX ORDER — GADOBUTROL 604.72 MG/ML
10 INJECTION INTRAVENOUS
Status: COMPLETED | OUTPATIENT
Start: 2019-03-06 | End: 2019-03-06

## 2019-03-06 RX ADMIN — IOHEXOL 30 ML: 350 INJECTION, SOLUTION INTRAVENOUS at 07:03

## 2019-03-06 RX ADMIN — GADOBUTROL 10 ML: 604.72 INJECTION INTRAVENOUS at 11:03

## 2019-03-06 RX ADMIN — IOHEXOL 100 ML: 350 INJECTION, SOLUTION INTRAVENOUS at 09:03

## 2019-04-08 ENCOUNTER — OFFICE VISIT (OUTPATIENT)
Dept: NEUROLOGY | Facility: HOSPITAL | Age: 54
End: 2019-04-08
Attending: SURGERY
Payer: COMMERCIAL

## 2019-04-08 VITALS
HEIGHT: 71 IN | BODY MASS INDEX: 33.24 KG/M2 | DIASTOLIC BLOOD PRESSURE: 75 MMHG | HEART RATE: 67 BPM | TEMPERATURE: 97 F | SYSTOLIC BLOOD PRESSURE: 111 MMHG | WEIGHT: 237.44 LBS

## 2019-04-08 DIAGNOSIS — C7B.8 SECONDARY NEUROENDOCRINE TUMOR OF LIVER: ICD-10-CM

## 2019-04-08 DIAGNOSIS — C7A.012 MALIGNANT CARCINOID TUMOR OF ILEUM: Primary | ICD-10-CM

## 2019-04-08 DIAGNOSIS — C7B.8 SECONDARY NEUROENDOCRINE TUMOR OF DISTANT LYMPH NODES: ICD-10-CM

## 2019-04-08 DIAGNOSIS — D49.89 NEOPLASM OF ABDOMEN: ICD-10-CM

## 2019-04-08 PROCEDURE — 99214 OFFICE O/P EST MOD 30 MIN: CPT | Performed by: SURGERY

## 2019-04-08 RX ORDER — METOPROLOL SUCCINATE 25 MG/1
TABLET, EXTENDED RELEASE ORAL
COMMUNITY
Start: 2019-02-19 | End: 2021-12-21

## 2019-04-08 NOTE — PROGRESS NOTES
"NOLANETS:  [facility]  A collaboration between [facility] and [facility]    PATIENT: Omid Falcon  MRN: [id number]  DATE: 4/8/2019    Vitals:    04/08/19 0817   BP: 111/75   Pulse: 67   Temp: 96.6 °F (35.9 °C)   TempSrc: Oral   Weight: 107.7 kg (237 lb 7 oz)   Height: 5' 11" (1.803 m)      Last 2 Weight Measurements:   Wt Readings from Last 2 Encounters:   04/08/19 107.7 kg (237 lb 7 oz)   12/01/18 109.3 kg (240 lb 15.4 oz)     BMI: Body mass index is 33.12 kg/m².    Karnofsky Score    Karnofsky Score:  90% - Able to Carry on Normal Activity: Minor Symptoms of Disease       Diagnosis:   1. Malignant carcinoid tumor of ileum    2. Secondary neuroendocrine tumor of distant lymph nodes    3. Secondary neuroendocrine tumor of liver    4. Neoplasm of abdomen      Interval History: Mr. Falcon returns to the office In routine follow up of an ileal NEt with savita mets liver mets-- on lanreotide s/p cytoreduction in June 2018    Past Medical History:   Diagnosis Date    Cancer     colon cancer    Diarrhea     Gout     Kidney stone     Malignant carcinoid tumor of ileum 02/2016    Malignant carcinoid tumors of other sites 01/2016       Past Surgical History:   Procedure Laterality Date    BACK SURGERY      CHOLECYSTECTOMY N/A 6/18/2018    [facility]formed [facility] OR    COLON SURGERY      CYSTOSCOPY WITH RETROGRADE PYELOGRAM Right 12/11/2015    [facility]    EXPLORATORY-LAPAROTOMY N/A 6/18/2018    [facility] at [facility] OR    KNEE SURGERY      LITHOTRIPSY-LASER Right 12/11/2015    [facility] OR    REPAIR-HERNIA-INCISIONAL-INITIAL with mesh Bilateral 6/18/2018    Performed by NEEL Gould [facility] at [facility] OR    RESECTION-HEPATIC N/A 6/18/2018    Performed by [facility] at [facility] OR    URETEROSCOPY " Right 12/11/2015    [facility] OR       Review of patient's allergies indicates:   Allergen Reactions    Ceftriaxone Hives, Itching and Other (See Comments)     Coughing and rhinitis  Flushing feeling  Coughing and rhinitis  Other reaction(s): Other (See Comments)  Coughing and rhinitis  Flushing feeling    Dermabond [tissue glues] Hives    Epinephrine Other (See Comments)     With chemo meds       Current Outpatient Medications   Medication Sig Dispense Refill    acetaminophen (TYLENOL) 325 MG tablet Take 2 tablets (650 mg total) by mouth every 8 (eight) hours as needed for Pain.  0    cholestyramine (QUESTRAN) 4 gram packet Take 1 packet (4 g total) by mouth 3 (three) times daily with meals. 1/2 pack TID 60 packet 11    lanreotide (SOMATULINE DEPOT) 120 mg/0.5 mL Syrg Inject 120 mg into the skin every 28 days.      famotidine (PEPCID) 20 MG tablet Take 1 tablet (20 mg total) by mouth 2 (two) times daily. 60 tablet 0    metoprolol succinate (TOPROL-XL) 25 MG 24 hr tablet        No current facility-administered medications for this visit.        Review of Systems     Number of Days per Week Number per Day   DIARRHEA 0    BRISTOL STOOL SCALE RATING     FLUSHING 0    WHEEZING 0      WEIGHT GAIN/LOSS 237 today-- stable   ENERGY RATING (0-10) 10        Physical Exam: deferred.     Pathology Data:  No new tissue    Laboratory Data:  Neuroendocrine Labs Latest Ref Rng & Units 2/15/2019 12/1/2018   EXT 5 HIAA BLOOD 0 - 22 ng/ml 10    EXT SEROTONIN 56 - 244 ng/ml 95    EXT CHROMOGRANIN A 25 - 140 ng/ml     SEROTONIN <=230 ng/mL     PANCREASTATIN 10 - 135 pg/mL     EXT PANCREASTATIN KIM 10 - 135 pg/ml 68    NEUROKININ A 0 - 40 pg/mL     EXT NEUROKININ A KIM 0 - 40 pg/ml 10    EXT LANREOTIDE LEVEL pg/ml 5,636      Neuroendocrine Labs Latest Ref Rng & Units 12/1/2018 11/30/2018   EXT 5 HIAA BLOOD 0 - 22 ng/ml     EXT SEROTONIN 56 - 244 ng/ml     EXT CHROMOGRANIN A 25 - 140 ng/ml      SEROTONIN <=230 ng/mL     PANCREASTATIN 10 - 135 pg/mL     EXT PANCREASTATIN KIM 10 - 135 pg/ml     NEUROKININ A 0 - 40 pg/mL     EXT NEUROKININ A KIM 0 - 40 pg/ml     EXT LANREOTIDE LEVEL pg/ml       Neuroendocrine Labs Latest Ref Rng & Units 10/8/2018 8/3/2018   EXT 5 HIAA BLOOD 0 - 22 ng/ml  11   EXT SEROTONIN 56 - 244 ng/ml  100   EXT CHROMOGRANIN A 25 - 140 ng/ml     SEROTONIN <=230 ng/mL     PANCREASTATIN 10 - 135 pg/mL     EXT PANCREASTATIN KIM 10 - 135 pg/ml  86   NEUROKININ A 0 - 40 pg/mL     EXT NEUROKININ A KIM 0 - 40 pg/ml  16   EXT LANREOTIDE LEVEL pg/ml  1,956         Radiology Data:  FINDINGS:  Postoperative changes of the liver identified.  There is a 0.3 cm arterial hyperenhancing focus within the inferior aspect of segment 4B, unchanged when compared to the previous exam (axial series 11, image 50).  Additional 0.4 cm arterial hyperenhancing focus identified within segment 7, unchanged when compared to the previous exam (axial series 11, image 38).  There are a few scattered foci of diffusion signal hyperintensity within the right and left lobes which cannot be confirmed on post-contrast images but have remained unchanged is in size and conspicuity when compared to the previous exam.  No intrahepatic bile duct dilatation.  Portal vasculature is patent.    Gallbladder is surgically absent.  Common bile duct is slightly dilated measuring 8 mm in maximum dimension with tapering at the level of the ampulla.    Stomach, duodenum, spleen, pancreas and adrenal glands are within normal limits.    Kidneys are normal in size and location.  Nonenhancing cortical cystic lesions noted within both kidneys, likely cysts.  No definite cortical enhancing lesions.  No hydronephrosis or hydroureter.    The small bowel is normal in caliber.  Postoperative changes of right hemicolectomy.  Visualized colon demonstrates no significant abnormalities.    No ascites.  No definite abdominal or pelvic lymph node  enlargement.  No focal mesenteric masses.    Aorta tapers normally with mild atherosclerotic calcification.    Surgical scar noted within the anterior midline abdominal wall.    Postoperative changes of the lumbar spine noted.      Impression       1. Postoperative changes of the liver.  Stable subcentimeter arterial hyperenhancing foci within the right and left lobes, indeterminate.  No new lesions identified.  2. Postoperative changes of partial colectomy and cholecystectomy.  No lesions meet criteria for RECIST inclusion.               Impression       1. Postoperative changes of the liver.  No definite CT findings to correlate with the segment 4B arterial hyperenhancing lesion noted on MRI dated 09/28/2018.  Follow-up MRI recommended.  2. Postoperative changes of right colectomy and possible partial sigmoidectomy.  3. Additional stable chronic findings as detailed above.             Impression:  1. CASI       Plan:restage in 6 months       Labs: Markers: 3 month intervals             Scans: 6 month intervals    Echocardiogram: 12 month intervals    Return to Clinic:6 month intervals    Orders placed this visit:   Orders Placed This Encounter   Procedures    CT Abdomen Pelvis With Contrast    MRI Abdomen W WO Contrast    5-HIAA Plasma (Neuoendocrine)    Serotonin serum    Pancreastatin    Neurokinin A    CBC auto differential    Comprehensive metabolic panel    Lanreotide Drug Levels        25 Minutes Face-to-Face; Counseling/Coordination of Care > 50 percent of Visit     Jim Edwards MD, FACS  The [employer] Professor of Surgery, [facility]  [address], [address]  [place]  [address]  Cell [phone]  ph. [phone]; [phone]  fax. [phone]  [email or web address]

## 2019-04-08 NOTE — LETTER
May 1, 2019        Juan Antonio Frances MD  [address] [place] [id number]       NOLANETS: [facility]  A collaboration between [facility] and [facility] [address]  [address]  [place] [address]  Phone: [phone]  Fax: [phone]        NEEL Gould M.D., FACS  Yeison Mcgill M.D.  Houston Aquino M.D.   Joan Miller M.D., FACS  DO Jim Wyatt M.D., FACS   Patient: Omid Falcon   MR Number: [id number]   YOB: 1965   Date of Visit: 4/8/2019     Dear Dr. Frances    Thank you for the kind referral of Omid Falcon. We saw this patient on 4/8/2019, and a copy of our clinic note is enclosed. We certainly appreciate the opportunity to participate in your patient's care.     If you have any questions or wish to discuss your patient further, please do not hesitate to contact us at [phone].       Kindest personal regards,        Jim Edwards MD, FACS  The [employer]in[employer] Professor of Surgery & Neurosciences  [facility], Dept. Of Surgery  [address] [place] [address], [address]  [place] [address] [phone]

## 2019-04-08 NOTE — PATIENT INSTRUCTIONS
Blood work / Lab work / Tumor markers every 3 mos.  Get lab work drawn at least 1 month prior to appointment. --- due May 2019 and August 2019    Scans:  CT Abd/Pelvis and MRI liver in 6 mos.  ---  Due in September 2019  Call Scheduling Department at [phone] to schedule scans prior to next appointment:      Return clinic appointment in 6 months with Dr. Edwards - appointment made    Echo every year - due September 2019

## 2019-07-23 LAB
EXT 24 HR UR METANEPHRINE: ABNORMAL
EXT 24 HR UR METANEPHRINE: ABNORMAL
EXT 24 HR UR NORMETANEPHRINE: ABNORMAL
EXT 25 HYDROXY VIT D2: ABNORMAL
EXT 25 HYDROXY VIT D2: ABNORMAL
EXT 25 HYDROXY VIT D3: ABNORMAL
EXT 25 HYDROXY VIT D3: ABNORMAL
EXT 5 HIAA 24 HR URINE: ABNORMAL
EXT 5 HIAA 24 HR URINE: ABNORMAL
EXT 5 HIAA BLOOD: 35 NG/ML (ref 0–22)
EXT 5 HIAA BLOOD: ABNORMAL
EXT ACTH: ABNORMAL
EXT ACTH: ABNORMAL
EXT AFP: ABNORMAL
EXT AFP: ABNORMAL
EXT ALBUMIN: ABNORMAL
EXT ALBUMIN: ABNORMAL
EXT ALKALINE PHOSPHATASE: ABNORMAL
EXT ALKALINE PHOSPHATASE: ABNORMAL
EXT ALT: ABNORMAL
EXT ALT: ABNORMAL
EXT AMYLASE: ABNORMAL
EXT AMYLASE: ABNORMAL
EXT ANTI ISLET CELL AB: ABNORMAL
EXT ANTI ISLET CELL AB: ABNORMAL
EXT ANTI PARIETAL CELL AB: ABNORMAL
EXT ANTI PARIETAL CELL AB: ABNORMAL
EXT ANTI THYROID AB: ABNORMAL
EXT ANTI THYROID AB: ABNORMAL
EXT AST: ABNORMAL
EXT AST: ABNORMAL
EXT BILIRUBIN DIRECT: ABNORMAL
EXT BILIRUBIN DIRECT: ABNORMAL MG/DL
EXT BILIRUBIN TOTAL: ABNORMAL
EXT BILIRUBIN TOTAL: ABNORMAL
EXT BK VIRUS DNA QN PCR: ABNORMAL
EXT BK VIRUS DNA QN PCR: ABNORMAL
EXT BUN: ABNORMAL
EXT BUN: ABNORMAL
EXT C PEPTIDE: ABNORMAL
EXT C PEPTIDE: ABNORMAL
EXT CA 125: ABNORMAL
EXT CA 125: ABNORMAL
EXT CA 19-9: ABNORMAL
EXT CA 19-9: ABNORMAL
EXT CA 27-29: ABNORMAL
EXT CA 27-29: ABNORMAL
EXT CALCITONIN: ABNORMAL
EXT CALCITONIN: ABNORMAL
EXT CALCIUM: ABNORMAL
EXT CALCIUM: ABNORMAL
EXT CEA: ABNORMAL
EXT CEA: ABNORMAL
EXT CHLORIDE: ABNORMAL
EXT CHLORIDE: ABNORMAL
EXT CHOLESTEROL: ABNORMAL
EXT CHOLESTEROL: ABNORMAL
EXT CHROMOGRANIN A: ABNORMAL
EXT CHROMOGRANIN A: ABNORMAL
EXT CO2: ABNORMAL
EXT CO2: ABNORMAL
EXT CREATININE UA: ABNORMAL
EXT CREATININE UA: ABNORMAL
EXT CREATININE: ABNORMAL
EXT CREATININE: ABNORMAL MG/DL
EXT CYCLOSPORONE LEVEL: ABNORMAL
EXT CYCLOSPORONE LEVEL: ABNORMAL
EXT DOPAMINE: ABNORMAL
EXT DOPAMINE: ABNORMAL
EXT EBV DNA BY PCR: ABNORMAL
EXT EBV DNA BY PCR: ABNORMAL
EXT EPINEPHRINE: ABNORMAL
EXT EPINEPHRINE: ABNORMAL
EXT FOLATE: ABNORMAL
EXT FOLATE: ABNORMAL
EXT FREE T3: ABNORMAL
EXT FREE T3: ABNORMAL
EXT FREE T4: ABNORMAL
EXT FREE T4: ABNORMAL
EXT FSH: ABNORMAL
EXT FSH: ABNORMAL
EXT GASTRIN RELEASING PEPTIDE: ABNORMAL
EXT GASTRIN: ABNORMAL
EXT GASTRIN: ABNORMAL
EXT GGT: ABNORMAL
EXT GGT: ABNORMAL
EXT GHRELIN: ABNORMAL
EXT GHRELIN: ABNORMAL
EXT GLUCAGON: ABNORMAL
EXT GLUCAGON: ABNORMAL
EXT GLUCOSE: ABNORMAL
EXT GLUCOSE: ABNORMAL
EXT GROWTH HORMONE: ABNORMAL
EXT GROWTH HORMONE: ABNORMAL
EXT HCV RNA QUANT PCR: ABNORMAL
EXT HCV RNA QUANT PCR: ABNORMAL
EXT HDL: ABNORMAL
EXT HDL: ABNORMAL
EXT HEMATOCRIT: ABNORMAL
EXT HEMATOCRIT: ABNORMAL
EXT HEMOGLOBIN A1C: ABNORMAL
EXT HEMOGLOBIN A1C: ABNORMAL %
EXT HEMOGLOBIN: ABNORMAL
EXT HEMOGLOBIN: ABNORMAL
EXT HISTAMINE 24 HR URINE: ABNORMAL
EXT HISTAMINE 24 HR URINE: ABNORMAL
EXT HISTAMINE: ABNORMAL
EXT HISTAMINE: ABNORMAL
EXT IGF-1: ABNORMAL
EXT IGF-1: ABNORMAL
EXT IMMUNKNOW (NON-STIMULATED): ABNORMAL
EXT IMMUNKNOW (NON-STIMULATED): ABNORMAL
EXT IMMUNKNOW (STIMULATED): ABNORMAL
EXT IMMUNKNOW (STIMULATED): ABNORMAL
EXT INR: ABNORMAL
EXT INR: ABNORMAL
EXT INSULIN: ABNORMAL
EXT INSULIN: ABNORMAL
EXT LANREOTIDE LEVEL: 3422 PG/ML
EXT LANREOTIDE LEVEL: ABNORMAL
EXT LDH, TOTAL: ABNORMAL
EXT LDH, TOTAL: ABNORMAL
EXT LDL CHOLESTEROL: ABNORMAL
EXT LDL CHOLESTEROL: ABNORMAL
EXT LIPASE: ABNORMAL
EXT LIPASE: ABNORMAL
EXT MAGNESIUM: ABNORMAL
EXT MAGNESIUM: ABNORMAL
EXT METANEPHRINE FREE PLASMA: ABNORMAL
EXT METANEPHRINE FREE PLASMA: ABNORMAL
EXT MOTILIN: ABNORMAL
EXT MOTILIN: ABNORMAL
EXT NEUROKININ A CAMB: ABNORMAL
EXT NEUROKININ A CAMB: ABNORMAL
EXT NEUROKININ A ISI: 11 PG/ML (ref 0–40)
EXT NEUROKININ A ISI: ABNORMAL
EXT NEUROTENSIN: ABNORMAL
EXT NEUROTENSIN: ABNORMAL
EXT NOREPINEPHRINE: ABNORMAL
EXT NOREPINEPHRINE: ABNORMAL
EXT NORMETANEPHRINE: ABNORMAL
EXT NORMETANEPHRINE: ABNORMAL
EXT NSE: ABNORMAL
EXT NSE: ABNORMAL
EXT OCTREOTIDE LEVEL: ABNORMAL
EXT OCTREOTIDE LEVEL: ABNORMAL
EXT PANCREASTATIN CAMB: 110 PG/ML (ref 0–88)
EXT PANCREASTATIN CAMB: ABNORMAL
EXT PANCREASTATIN ISI: ABNORMAL
EXT PANCREASTATIN ISI: ABNORMAL
EXT PANCREATIC POLYPEPTIDE: ABNORMAL
EXT PANCREATIC POLYPEPTIDE: ABNORMAL
EXT PHOSPHORUS: ABNORMAL
EXT PHOSPHORUS: ABNORMAL
EXT PLATELETS: ABNORMAL
EXT PLATELETS: ABNORMAL
EXT POTASSIUM: ABNORMAL
EXT POTASSIUM: ABNORMAL
EXT PROGRAF LEVEL: ABNORMAL
EXT PROGRAF LEVEL: ABNORMAL
EXT PROLACTIN: ABNORMAL
EXT PROLACTIN: ABNORMAL
EXT PROTEIN TOTAL: ABNORMAL
EXT PROTEIN TOTAL: ABNORMAL
EXT PROTEIN UA: ABNORMAL
EXT PROTEIN UA: ABNORMAL
EXT PT: ABNORMAL
EXT PT: ABNORMAL
EXT PTH, INTACT: ABNORMAL
EXT PTH, INTACT: ABNORMAL
EXT PTT: ABNORMAL
EXT PTT: ABNORMAL
EXT RAPAMUNE LEVEL: ABNORMAL
EXT RAPAMUNE LEVEL: ABNORMAL
EXT SEROTONIN: 116 NG/ML (ref 56–244)
EXT SEROTONIN: ABNORMAL
EXT SODIUM: ABNORMAL
EXT SODIUM: ABNORMAL MMOL/L
EXT SOMATOSTATIN: ABNORMAL
EXT SOMATOSTATIN: ABNORMAL
EXT SUBSTANCE P: ABNORMAL
EXT SUBSTANCE P: ABNORMAL
EXT TRIGLYCERIDES: ABNORMAL
EXT TRIGLYCERIDES: ABNORMAL
EXT TRYPTASE: ABNORMAL
EXT TRYPTASE: ABNORMAL
EXT TSH: ABNORMAL
EXT TSH: ABNORMAL
EXT URIC ACID: ABNORMAL
EXT URIC ACID: ABNORMAL
EXT URINE AMYLASE U/HR: ABNORMAL
EXT URINE AMYLASE U/HR: ABNORMAL
EXT URINE AMYLASE U/L: ABNORMAL
EXT URINE AMYLASE U/L: ABNORMAL
EXT VASOACTIVE INTESTINAL POLYPEPTIDE: ABNORMAL
EXT VASOACTIVE INTESTINAL POLYPEPTIDE: ABNORMAL
EXT VITAMIN B12: ABNORMAL
EXT VITAMIN B12: ABNORMAL
EXT VMA 24 HR URINE: ABNORMAL
EXT VMA 24 HR URINE: ABNORMAL
EXT WBC: ABNORMAL
EXT WBC: ABNORMAL
NEURON SPECIFIC ENOLASE: ABNORMAL
NEURON SPECIFIC ENOLASE: ABNORMAL

## 2019-08-08 LAB
REF LAB TEST NAME: NORMAL
REF LAB TEST RESULTS: NORMAL

## 2019-08-23 LAB — 5-HIAA, PLASMA (NEUROEND): 35 NG/ML

## 2019-09-06 LAB
EXT 24 HR UR METANEPHRINE: NORMAL
EXT 24 HR UR METANEPHRINE: NORMAL
EXT 24 HR UR NORMETANEPHRINE: NORMAL
EXT 25 HYDROXY VIT D2: NORMAL
EXT 25 HYDROXY VIT D2: NORMAL
EXT 25 HYDROXY VIT D3: NORMAL
EXT 25 HYDROXY VIT D3: NORMAL
EXT 5 HIAA 24 HR URINE: NORMAL
EXT 5 HIAA 24 HR URINE: NORMAL
EXT 5 HIAA BLOOD: 14 NG/ML (ref 0–22)
EXT 5 HIAA BLOOD: NORMAL
EXT ACTH: NORMAL
EXT ACTH: NORMAL
EXT AFP: NORMAL
EXT AFP: NORMAL
EXT ALBUMIN: 4.4 G/DL (ref 3.6–5.1)
EXT ALBUMIN: NORMAL
EXT ALKALINE PHOSPHATASE: NORMAL
EXT ALKALINE PHOSPHATASE: NORMAL
EXT ALT: 25 U/L (ref 9–46)
EXT ALT: NORMAL
EXT AMYLASE: NORMAL
EXT AMYLASE: NORMAL
EXT ANTI ISLET CELL AB: NORMAL
EXT ANTI ISLET CELL AB: NORMAL
EXT ANTI PARIETAL CELL AB: NORMAL
EXT ANTI PARIETAL CELL AB: NORMAL
EXT ANTI THYROID AB: NORMAL
EXT ANTI THYROID AB: NORMAL
EXT AST: 20 U/L (ref 10–35)
EXT AST: NORMAL
EXT BILIRUBIN DIRECT: NORMAL
EXT BILIRUBIN DIRECT: NORMAL MG/DL
EXT BILIRUBIN TOTAL: 0.7 MG/DL (ref 0.2–1.2)
EXT BILIRUBIN TOTAL: NORMAL
EXT BK VIRUS DNA QN PCR: NORMAL
EXT BK VIRUS DNA QN PCR: NORMAL
EXT BUN: 17 MG/DL (ref 7–25)
EXT BUN: NORMAL
EXT C PEPTIDE: NORMAL
EXT C PEPTIDE: NORMAL
EXT CA 125: NORMAL
EXT CA 125: NORMAL
EXT CA 19-9: NORMAL
EXT CA 19-9: NORMAL
EXT CA 27-29: NORMAL
EXT CA 27-29: NORMAL
EXT CALCITONIN: NORMAL
EXT CALCITONIN: NORMAL
EXT CALCIUM: 9.5 MG/DL (ref 8.6–10.3)
EXT CALCIUM: NORMAL
EXT CEA: NORMAL
EXT CEA: NORMAL
EXT CHLORIDE: 105 MMOL/L (ref 98–110)
EXT CHLORIDE: NORMAL
EXT CHOLESTEROL: NORMAL
EXT CHOLESTEROL: NORMAL
EXT CHROMOGRANIN A: NORMAL
EXT CHROMOGRANIN A: NORMAL
EXT CO2: 26 MMOL/L (ref 20–32)
EXT CO2: NORMAL
EXT CREATININE UA: NORMAL
EXT CREATININE UA: NORMAL
EXT CREATININE: 1.27 MG/DL (ref 0.7–1.33)
EXT CREATININE: NORMAL
EXT CYCLOSPORONE LEVEL: NORMAL
EXT CYCLOSPORONE LEVEL: NORMAL
EXT DOPAMINE: NORMAL
EXT DOPAMINE: NORMAL
EXT EBV DNA BY PCR: NORMAL
EXT EBV DNA BY PCR: NORMAL
EXT EPINEPHRINE: NORMAL
EXT EPINEPHRINE: NORMAL
EXT FOLATE: NORMAL
EXT FOLATE: NORMAL
EXT FREE T3: NORMAL
EXT FREE T3: NORMAL
EXT FREE T4: NORMAL
EXT FREE T4: NORMAL
EXT FSH: NORMAL
EXT FSH: NORMAL
EXT GASTRIN RELEASING PEPTIDE: NORMAL
EXT GASTRIN: NORMAL
EXT GASTRIN: NORMAL
EXT GGT: NORMAL
EXT GGT: NORMAL
EXT GHRELIN: NORMAL
EXT GHRELIN: NORMAL
EXT GLUCAGON: NORMAL
EXT GLUCAGON: NORMAL
EXT GLUCOSE: 130 MG/DL (ref 65–139)
EXT GLUCOSE: NORMAL
EXT GROWTH HORMONE: NORMAL
EXT GROWTH HORMONE: NORMAL
EXT HCV RNA QUANT PCR: NORMAL
EXT HCV RNA QUANT PCR: NORMAL
EXT HDL: NORMAL
EXT HDL: NORMAL
EXT HEMATOCRIT: 41.8 % (ref 38.5–50)
EXT HEMATOCRIT: NORMAL
EXT HEMOGLOBIN A1C: NORMAL
EXT HEMOGLOBIN A1C: NORMAL %
EXT HEMOGLOBIN: 14.1 G/DL (ref 13.2–17.1)
EXT HEMOGLOBIN: NORMAL
EXT HISTAMINE 24 HR URINE: NORMAL
EXT HISTAMINE 24 HR URINE: NORMAL
EXT HISTAMINE: NORMAL
EXT HISTAMINE: NORMAL
EXT IGF-1: NORMAL
EXT IGF-1: NORMAL
EXT IMMUNKNOW (NON-STIMULATED): NORMAL
EXT IMMUNKNOW (NON-STIMULATED): NORMAL
EXT IMMUNKNOW (STIMULATED): NORMAL
EXT IMMUNKNOW (STIMULATED): NORMAL
EXT INR: NORMAL
EXT INR: NORMAL
EXT INSULIN: NORMAL
EXT INSULIN: NORMAL
EXT LANREOTIDE LEVEL: 4449 PG/ML
EXT LANREOTIDE LEVEL: NORMAL
EXT LDH, TOTAL: NORMAL
EXT LDH, TOTAL: NORMAL
EXT LDL CHOLESTEROL: NORMAL
EXT LDL CHOLESTEROL: NORMAL
EXT LIPASE: NORMAL
EXT LIPASE: NORMAL
EXT MAGNESIUM: NORMAL
EXT MAGNESIUM: NORMAL
EXT METANEPHRINE FREE PLASMA: NORMAL
EXT METANEPHRINE FREE PLASMA: NORMAL
EXT MOTILIN: NORMAL
EXT MOTILIN: NORMAL
EXT NEUROKININ A CAMB: NORMAL
EXT NEUROKININ A CAMB: NORMAL
EXT NEUROKININ A ISI: NORMAL
EXT NEUROKININ A ISI: NORMAL PG/ML (ref 0–40)
EXT NEUROTENSIN: NORMAL
EXT NEUROTENSIN: NORMAL
EXT NOREPINEPHRINE: NORMAL
EXT NOREPINEPHRINE: NORMAL
EXT NORMETANEPHRINE: NORMAL
EXT NORMETANEPHRINE: NORMAL
EXT NSE: NORMAL
EXT NSE: NORMAL
EXT OCTREOTIDE LEVEL: NORMAL
EXT OCTREOTIDE LEVEL: NORMAL
EXT PANCREASTATIN CAMB: NORMAL
EXT PANCREASTATIN CAMB: NORMAL
EXT PANCREASTATIN ISI: 79 PG/ML (ref 10–135)
EXT PANCREASTATIN ISI: NORMAL
EXT PANCREATIC POLYPEPTIDE: NORMAL
EXT PANCREATIC POLYPEPTIDE: NORMAL
EXT PHOSPHORUS: NORMAL
EXT PHOSPHORUS: NORMAL
EXT PLATELETS: 198 THOUSAND/UL (ref 140–400)
EXT PLATELETS: NORMAL
EXT POTASSIUM: 4.1 MMOL/L (ref 3.5–5.3)
EXT POTASSIUM: NORMAL
EXT PROGRAF LEVEL: NORMAL
EXT PROGRAF LEVEL: NORMAL
EXT PROLACTIN: NORMAL
EXT PROLACTIN: NORMAL
EXT PROTEIN TOTAL: 7.4 G/DL (ref 6.1–8.1)
EXT PROTEIN TOTAL: NORMAL
EXT PROTEIN UA: NORMAL
EXT PROTEIN UA: NORMAL
EXT PT: NORMAL
EXT PT: NORMAL
EXT PTH, INTACT: NORMAL
EXT PTH, INTACT: NORMAL
EXT PTT: NORMAL
EXT PTT: NORMAL
EXT RAPAMUNE LEVEL: NORMAL
EXT RAPAMUNE LEVEL: NORMAL
EXT SEROTONIN: 90 NG/ML (ref 56–244)
EXT SEROTONIN: NORMAL
EXT SODIUM: 138 MMOL/L (ref 135–146)
EXT SODIUM: NORMAL
EXT SOMATOSTATIN: NORMAL
EXT SOMATOSTATIN: NORMAL
EXT SUBSTANCE P: NORMAL
EXT SUBSTANCE P: NORMAL
EXT TRIGLYCERIDES: NORMAL
EXT TRIGLYCERIDES: NORMAL
EXT TRYPTASE: NORMAL
EXT TRYPTASE: NORMAL
EXT TSH: NORMAL
EXT TSH: NORMAL
EXT URIC ACID: NORMAL
EXT URIC ACID: NORMAL
EXT URINE AMYLASE U/HR: NORMAL
EXT URINE AMYLASE U/HR: NORMAL
EXT URINE AMYLASE U/L: NORMAL
EXT URINE AMYLASE U/L: NORMAL
EXT VASOACTIVE INTESTINAL POLYPEPTIDE: NORMAL
EXT VASOACTIVE INTESTINAL POLYPEPTIDE: NORMAL
EXT VITAMIN B12: NORMAL
EXT VITAMIN B12: NORMAL
EXT VMA 24 HR URINE: NORMAL
EXT VMA 24 HR URINE: NORMAL
EXT WBC: 4.8 THOUSAND/UL (ref 3.8–10.8)
EXT WBC: NORMAL
NEURON SPECIFIC ENOLASE: NORMAL
NEURON SPECIFIC ENOLASE: NORMAL

## 2019-09-11 LAB
ALBUMIN SERPL-MCNC: 4.4 G/DL (ref 3.6–5.1)
ALBUMIN/GLOB SERPL: 1.5 (CALC) (ref 1–2.5)
ALP SERPL-CCNC: 62 U/L (ref 40–115)
ALT SERPL-CCNC: 25 U/L (ref 9–46)
AST SERPL-CCNC: 20 U/L (ref 10–35)
BASOPHILS # BLD AUTO: 38 CELLS/UL (ref 0–200)
BASOPHILS NFR BLD AUTO: 0.8 %
BILIRUB SERPL-MCNC: 0.7 MG/DL (ref 0.2–1.2)
BUN SERPL-MCNC: 17 MG/DL (ref 7–25)
BUN/CREAT SERPL: NORMAL (CALC) (ref 6–22)
CALCIUM SERPL-MCNC: 9.5 MG/DL (ref 8.6–10.3)
CHLORIDE SERPL-SCNC: 105 MMOL/L (ref 98–110)
CO2 SERPL-SCNC: 26 MMOL/L (ref 20–32)
CREAT SERPL-MCNC: 1.27 MG/DL (ref 0.7–1.33)
EOSINOPHIL # BLD AUTO: 19 CELLS/UL (ref 15–500)
EOSINOPHIL NFR BLD AUTO: 0.4 %
ERYTHROCYTE [DISTWIDTH] IN BLOOD BY AUTOMATED COUNT: 13.2 % (ref 11–15)
GFRSERPLBLD MDRD-ARVRAT: 65 ML/MIN/1.73M2
GLOBULIN SER CALC-MCNC: 3 G/DL (CALC) (ref 1.9–3.7)
GLUCOSE SERPL-MCNC: 135 MG/DL (ref 65–139)
HCT VFR BLD AUTO: 41.8 % (ref 38.5–50)
HGB BLD-MCNC: 14.1 G/DL (ref 13.2–17.1)
LYMPHOCYTES # BLD AUTO: 1320 CELLS/UL (ref 850–3900)
LYMPHOCYTES NFR BLD AUTO: 27.5 %
MCH RBC QN AUTO: 30.1 PG (ref 27–33)
MCHC RBC AUTO-ENTMCNC: 33.7 G/DL (ref 32–36)
MCV RBC AUTO: 89.1 FL (ref 80–100)
MONOCYTES # BLD AUTO: 422 CELLS/UL (ref 200–950)
MONOCYTES NFR BLD AUTO: 8.8 %
NEUTROPHILS # BLD AUTO: 3000 CELLS/UL (ref 1500–7800)
NEUTROPHILS NFR BLD AUTO: 62.5 %
PLATELET # BLD AUTO: 198 THOUSAND/UL (ref 140–400)
PMV BLD REES-ECKER: 10.2 FL (ref 7.5–12.5)
POTASSIUM SERPL-SCNC: 4.1 MMOL/L (ref 3.5–5.3)
PROT SERPL-MCNC: 7.4 G/DL (ref 6.1–8.1)
RBC # BLD AUTO: 4.69 MILLION/UL (ref 4.2–5.8)
SEROTONIN SER-MCNC: 90 NG/ML (ref 56–244)
SODIUM SERPL-SCNC: 138 MMOL/L (ref 135–146)
WBC # BLD AUTO: 4.8 THOUSAND/UL (ref 3.8–10.8)

## 2019-10-03 ENCOUNTER — TELEPHONE (OUTPATIENT)
Dept: NEUROLOGY | Facility: HOSPITAL | Age: 54
End: 2019-10-03

## 2019-10-03 DIAGNOSIS — C7B.8 SECONDARY NEUROENDOCRINE TUMOR OF DISTANT LYMPH NODES: ICD-10-CM

## 2019-10-03 DIAGNOSIS — C7A.012 MALIGNANT CARCINOID TUMOR OF ILEUM: Primary | ICD-10-CM

## 2019-10-03 DIAGNOSIS — C7B.8 SECONDARY NEUROENDOCRINE TUMOR OF LIVER: ICD-10-CM

## 2019-10-03 LAB
ALBUMIN SERPL-MCNC: 4.5 G/DL (ref 3.6–5.1)
ALBUMIN/GLOB SERPL: 1.5 (CALC) (ref 1–2.5)
ALP SERPL-CCNC: 64 U/L (ref 40–115)
ALT SERPL-CCNC: 30 U/L (ref 9–46)
AST SERPL-CCNC: 23 U/L (ref 10–35)
BASOPHILS # BLD AUTO: 28 CELLS/UL (ref 0–200)
BASOPHILS NFR BLD AUTO: 0.5 %
BILIRUB SERPL-MCNC: 0.6 MG/DL (ref 0.2–1.2)
BUN SERPL-MCNC: 22 MG/DL (ref 7–25)
BUN/CREAT SERPL: 14 (CALC) (ref 6–22)
CALCIUM SERPL-MCNC: 9.8 MG/DL (ref 8.6–10.3)
CHLORIDE SERPL-SCNC: 105 MMOL/L (ref 98–110)
CO2 SERPL-SCNC: 28 MMOL/L (ref 20–32)
CREAT SERPL-MCNC: 1.56 MG/DL (ref 0.7–1.33)
EOSINOPHIL # BLD AUTO: 28 CELLS/UL (ref 15–500)
EOSINOPHIL NFR BLD AUTO: 0.5 %
ERYTHROCYTE [DISTWIDTH] IN BLOOD BY AUTOMATED COUNT: 13 % (ref 11–15)
GFRSERPLBLD MDRD-ARVRAT: 51 ML/MIN/1.73M2
GLOBULIN SER CALC-MCNC: 3.1 G/DL (CALC) (ref 1.9–3.7)
GLUCOSE SERPL-MCNC: 109 MG/DL (ref 65–139)
HCT VFR BLD AUTO: 42.1 % (ref 38.5–50)
HGB BLD-MCNC: 14 G/DL (ref 13.2–17.1)
LYMPHOCYTES # BLD AUTO: 1425 CELLS/UL (ref 850–3900)
LYMPHOCYTES NFR BLD AUTO: 25.9 %
MCH RBC QN AUTO: 29.2 PG (ref 27–33)
MCHC RBC AUTO-ENTMCNC: 33.3 G/DL (ref 32–36)
MCV RBC AUTO: 87.9 FL (ref 80–100)
MONOCYTES # BLD AUTO: 380 CELLS/UL (ref 200–950)
MONOCYTES NFR BLD AUTO: 6.9 %
NEUROKININ A: 11 PG/ML
NEUTROPHILS # BLD AUTO: 3641 CELLS/UL (ref 1500–7800)
NEUTROPHILS NFR BLD AUTO: 66.2 %
PANCREASTATIN: 110 PG/ML
PLATELET # BLD AUTO: 179 THOUSAND/UL (ref 140–400)
PMV BLD REES-ECKER: 10.2 FL (ref 7.5–12.5)
POTASSIUM SERPL-SCNC: 5.1 MMOL/L (ref 3.5–5.3)
PROT SERPL-MCNC: 7.6 G/DL (ref 6.1–8.1)
RBC # BLD AUTO: 4.79 MILLION/UL (ref 4.2–5.8)
REF LAB TEST NAME: NORMAL
REF LAB TEST RESULTS: NORMAL PG/ML
SEROTONIN SER-MCNC: 116 NG/ML (ref 56–244)
SODIUM SERPL-SCNC: 142 MMOL/L (ref 135–146)
WBC # BLD AUTO: 5.5 THOUSAND/UL (ref 3.8–10.8)

## 2019-10-03 NOTE — TELEPHONE ENCOUNTER
Returned patients call. Assisted patient to schedule for scans and appointment with Dr. Brewer. Patient has no further questions at this time.

## 2019-10-03 NOTE — TELEPHONE ENCOUNTER
----- Message from Chantale Rebolledo sent at 10/3/2019  9:21 AM CDT -----  Contact: [phone]  EAW  Patient is requesting a callback concerning his upcoming appt.

## 2019-10-03 NOTE — TELEPHONE ENCOUNTER
----- Message from Rashmi Higuera sent at 10/3/2019  2:57 PM CDT -----  Contact: SELF [phone]  EAW - Patient is calling to reschedule his appointment with Dr. Brewer. Please call

## 2019-10-08 LAB
5-HIAA, PLASMA (NEUROEND): 14 NG/ML
NEUROKININ A: NORMAL PG/ML
PANCREASTATIN: 79 PG/ML (ref 10–135)
REF LAB TEST NAME: NORMAL
REF LAB TEST RESULTS: NORMAL PG/ML

## 2019-10-25 ENCOUNTER — HOSPITAL ENCOUNTER (OUTPATIENT)
Dept: RADIOLOGY | Facility: HOSPITAL | Age: 54
Discharge: HOME | End: 2019-10-25
Attending: SURGERY
Payer: COMMERCIAL

## 2019-10-25 ENCOUNTER — HOSPITAL ENCOUNTER (OUTPATIENT)
Dept: CARDIOLOGY | Facility: HOSPITAL | Age: 54
Discharge: HOME | End: 2019-10-25
Attending: SURGERY
Payer: COMMERCIAL

## 2019-10-25 VITALS — HEIGHT: 71 IN | BODY MASS INDEX: 32.48 KG/M2 | WEIGHT: 232 LBS

## 2019-10-25 DIAGNOSIS — C7B.8 SECONDARY NEUROENDOCRINE TUMOR OF LIVER: ICD-10-CM

## 2019-10-25 DIAGNOSIS — D49.89 NEOPLASM OF ABDOMEN: ICD-10-CM

## 2019-10-25 DIAGNOSIS — C7A.012 MALIGNANT CARCINOID TUMOR OF ILEUM: ICD-10-CM

## 2019-10-25 DIAGNOSIS — C7B.8 SECONDARY NEUROENDOCRINE TUMOR OF DISTANT LYMPH NODES: ICD-10-CM

## 2019-10-25 LAB
AORTIC ROOT ANNULUS: 3.4 CM
AORTIC VALVE CUSP SEPERATION: 2.29 CM
ASCENDING AORTA: 3.33 CM
BSA FOR ECHO PROCEDURE: 2.3 M2
CREAT SERPL-MCNC: 1.3 MG/DL (ref 0.5–1.4)
CV ECHO LV RWT: 0.34 CM
E WAVE DECELERATION TIME: 168.61 MSEC
E/A RATIO: 1.11
ECHO LV POSTERIOR WALL: 0.98 CM (ref 0.6–1.1)
FRACTIONAL SHORTENING: 42 % (ref 28–44)
INTERVENTRICULAR SEPTUM: 1.09 CM (ref 0.6–1.1)
LA MAJOR: 4.02 CM
LA MINOR: 4.61 CM
LA WIDTH: 3.51 CM
LEFT ATRIUM SIZE: 3.51 CM
LEFT ATRIUM VOLUME INDEX: 20 ML/M2
LEFT ATRIUM VOLUME: 44.98 CM3
LEFT INTERNAL DIMENSION IN SYSTOLE: 3.3 CM (ref 2.1–4)
LEFT VENTRICLE DIASTOLIC VOLUME INDEX: 71.44 ML/M2
LEFT VENTRICLE DIASTOLIC VOLUME: 160.47 ML
LEFT VENTRICLE MASS INDEX: 106 G/M2
LEFT VENTRICLE SYSTOLIC VOLUME INDEX: 26.1 ML/M2
LEFT VENTRICLE SYSTOLIC VOLUME: 58.55 ML
LEFT VENTRICULAR INTERNAL DIMENSION IN DIASTOLE: 5.71 CM (ref 3.5–6)
LEFT VENTRICULAR MASS: 237.5 G
MV PEAK A VEL: 0.65 M/S
MV PEAK E VEL: 0.72 M/S
PISA TR MAX VEL: 1.96 M/S
PV PEAK VELOCITY: 0.84 CM/S
RA MAJOR: 4.02 CM
RA PRESSURE: 3 MMHG
RA WIDTH: 3.41 CM
SAMPLE: NORMAL
STJ: 3.1 CM
TR MAX PG: 15 MMHG
TV REST PULMONARY ARTERY PRESSURE: 18 MMHG

## 2019-10-25 PROCEDURE — 93306 TTE W/DOPPLER COMPLETE: CPT

## 2019-10-25 PROCEDURE — 74183 MRI ABD W/O CNTR FLWD CNTR: CPT | Mod: TC

## 2019-10-25 PROCEDURE — 25500020 PHARM REV CODE 255: Performed by: SURGERY

## 2019-10-25 PROCEDURE — A9585 GADOBUTROL INJECTION: HCPCS | Performed by: SURGERY

## 2019-10-25 PROCEDURE — 74177 CT ABDOMEN PELVIS WITH CONTRAST: ICD-10-PCS | Mod: 26,,, | Performed by: RADIOLOGY

## 2019-10-25 PROCEDURE — 74183 MRI ABD W/O CNTR FLWD CNTR: CPT | Mod: 26,,, | Performed by: RADIOLOGY

## 2019-10-25 PROCEDURE — 74177 CT ABD & PELVIS W/CONTRAST: CPT | Mod: TC

## 2019-10-25 PROCEDURE — 93306 TTE W/DOPPLER COMPLETE: CPT | Mod: 26,,, | Performed by: INTERNAL MEDICINE

## 2019-10-25 PROCEDURE — 93306 ECHO (CUPID ONLY): ICD-10-PCS | Mod: 26,,, | Performed by: INTERNAL MEDICINE

## 2019-10-25 PROCEDURE — 74183 MRI ABDOMEN W WO CONTRAST: ICD-10-PCS | Mod: 26,,, | Performed by: RADIOLOGY

## 2019-10-25 PROCEDURE — 74177 CT ABD & PELVIS W/CONTRAST: CPT | Mod: 26,,, | Performed by: RADIOLOGY

## 2019-10-25 RX ORDER — GADOBUTROL 604.72 MG/ML
10 INJECTION INTRAVENOUS
Status: COMPLETED | OUTPATIENT
Start: 2019-10-25 | End: 2019-10-25

## 2019-10-25 RX ADMIN — IOHEXOL 100 ML: 350 INJECTION, SOLUTION INTRAVENOUS at 10:10

## 2019-10-25 RX ADMIN — GADOBUTROL 10 ML: 604.72 INJECTION INTRAVENOUS at 09:10

## 2019-10-25 RX ADMIN — IOHEXOL 30 ML: 350 INJECTION, SOLUTION INTRAVENOUS at 09:10

## 2019-11-01 ENCOUNTER — OFFICE VISIT (OUTPATIENT)
Dept: NEUROLOGY | Facility: HOSPITAL | Age: 54
End: 2019-11-01
Attending: SURGERY
Payer: COMMERCIAL

## 2019-11-01 VITALS
TEMPERATURE: 97 F | DIASTOLIC BLOOD PRESSURE: 88 MMHG | HEART RATE: 59 BPM | HEIGHT: 70 IN | SYSTOLIC BLOOD PRESSURE: 136 MMHG | WEIGHT: 240.19 LBS | BODY MASS INDEX: 34.39 KG/M2

## 2019-11-01 DIAGNOSIS — C7B.8 NEUROENDOCRINE CARCINOMA METASTATIC TO LIVER: ICD-10-CM

## 2019-11-01 DIAGNOSIS — C7B.8 SECONDARY NEUROENDOCRINE TUMOR OF DISTANT LYMPH NODES: ICD-10-CM

## 2019-11-01 DIAGNOSIS — C7A.8 NEUROENDOCRINE CARCINOMA METASTATIC TO LIVER: ICD-10-CM

## 2019-11-01 DIAGNOSIS — C7B.8 SECONDARY NEUROENDOCRINE TUMOR OF LIVER: Primary | ICD-10-CM

## 2019-11-01 PROCEDURE — 99213 OFFICE O/P EST LOW 20 MIN: CPT | Performed by: SURGERY

## 2019-11-01 NOTE — PATIENT INSTRUCTIONS
Blood work / Lab work / Tumor markers: due every 6 mos.  --- Due March 2020 and September 2020  Get lab work drawn at least 1 month prior to appointment.    Scans:   CT Abd/Pelvis in 6 mos.  ---  Due April 2020  Hold on liver MRI  Hold on gallium 68 somatostatin receptor PET    Call Scheduling Department at [phone] to schedule scans prior to next appointment:      Return Clinic Appointment: in 6 months with Dr. Brewer - appointment made    Echo: hold on echocardiogram

## 2019-11-01 NOTE — PROGRESS NOTES
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  Review most recent imaging and blood work results. Establish surveillance and treatment plan.     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    Interval History:   He returns to clinic for scheduled follow-up to review the findings on the most recent imaging and blood work and to establish a surveillance program at this juncture.     Recent testing includes tumor markers (9/2019), Ct abd/pelvis (10/2019), MRI abd (10/2019), Ga 68 PET/CT scan (2/2018), echocardiogram (10/2019).    Vitals: There were no vitals taken for this visit. 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       Past Surgical History:  Past Surgical History:   Procedure Laterality Date    ABDOMINAL HERNIA REPAIR Bilateral 6/18/2018    Procedure: REPAIR-HERNIA-INCISIONAL-INITIAL with mesh;  Surgeon: NEEL Gould MD;  Location: [facility] OR;  Service: General;  Laterality: Bilateral;    BACK SURGERY      CHOLECYSTECTOMY N/A 6/18/2018    Procedure: CHOLECYSTECTOMY;  Surgeon: NEEL Gould MD;  Location: [facility] OR;  Service: General;  Laterality: N/A;     COLON SURGERY      EXPLORATORY LAPAROTOMY N/A 6/18/2018    Procedure: EXPLORATORY-LAPAROTOMY;  Surgeon: NEEL Gould MD;  Location: [facility] OR;  Service: General;  Laterality: N/A;    KNEE SURGERY      LIVER RESECTION N/A 6/18/2018    Procedure: RESECTION-HEPATIC;  Surgeon: NEEL Gould MD;  Location: [facility] OR;  Service: General;  Laterality: N/A;       Family History:  No family history on file.    Allergies:  Ceftriaxone; Dermabond [tissue glues]; and Epinephrine    Medications:  Current Outpatient Medications   Medication Sig    acetaminophen (TYLENOL) 325 MG tablet Take 2 tablets (650 mg total) by mouth every 8 (eight) hours as needed for Pain.    cholestyramine (QUESTRAN) 4 gram packet Take 1 packet (4 g total) by mouth 3 (three) times daily with meals. 1/2 pack TID    famotidine (PEPCID) 20 MG tablet Take 1 tablet (20 mg total) by mouth 2 (two) times daily.    lanreotide (SOMATULINE DEPOT) 120 mg/0.5 mL Syrg Inject 120 mg into the skin every 28 days.    metoprolol succinate (TOPROL-XL) 25 MG 24 hr tablet      No current facility-administered medications for this visit.         Review of Systems   Constitutional: Positive for diaphoresis.   HENT: Positive for hearing loss.    Eyes: Positive for visual disturbance.   Respiratory: Positive for shortness of breath.    Cardiovascular: Negative.    Gastrointestinal: Positive for diarrhea.   Endocrine: Negative.    Genitourinary: Negative.    Skin: Positive for rash.        moles   Allergic/Immunologic: Negative.    Neurological: Negative.    Hematological: Negative.    Psychiatric/Behavioral: Negative.           Objective:      Physical Exam   Constitutional: He is oriented to person, place, and time. He appears well-developed and well-nourished. No distress.   HENT:   Head: Normocephalic.   Eyes: Pupils are equal, round, and reactive to light.   Neck: Normal range of motion.   Cardiovascular: Normal rate.   Pulmonary/Chest: No stridor. No  respiratory distress.   Neurological: He is alert and oriented to person, place, and time.   Skin: Skin is warm. He is not diaphoretic.   Psychiatric: He has a normal mood and affect. His behavior is normal. Judgment and thought content normal.        Laboratory Data:    Neuroendocrine Labs Latest Ref Rng & Units 9/6/2019   EXT 5 HIAA BLOOD 0 - 22 ng/mL 14   EXT SEROTONIN 56 - 244 ng/mL 90   EXT CHROMOGRANIN A 25 - 140 ng/ml    SEROTONIN <=230 ng/mL    PANCREASTATIN 10 - 135 pg/mL    EXT PANCREASTATIN KIM 10 - 135 pg/mL 79   EXT PANCREASTATIN CAMB 0 - 88 pg/mL    NEUROKININ A 0 - 40 pg/mL    EXT NEUROKININ A KIM 0 - 40 pg/mL less than 10   EXT LANREOTIDE LEVEL pg/mL 4,449   WBC 3.8 - 10.8 Thousand/uL 4.8   EXT WBC 3.8 - 10.8 thousand/uL 4.8   HGB 13.2 - 17.1 g/dL 14.1   EXT HGB 13.2 - 17.1 g/dL 14.1   HCT 38.5 - 50.0 % 41.8   EXT HCT 38.5 - 50.0 % 41.8   PLATLETS 140 - 400 Thousand/uL 198   EXT PLATLETS 140 - 400 thousand/uL 198   PT 9.0 - 12.5 sec    PTT 21.0 - 32.0 sec    INR 0.8 - 1.2    GLUCOSE 65 - 139 mg/dL 135   EXT GLUCOSE 65 - 139 mg/dL 130   BUN 7 - 25 mg/dL 17   EXT BUN 7 - 25 mg/dL 17   CREATININE 0.70 - 1.33 mg/dL 1.27   EXT CREATININE 0.70 - 1.33 mg/dL 1.27    - 146 mmol/L 138   EXT  - 146 mmol/L 138   K 3.5 - 5.3 mmol/L 4.1   EXT K 3.5 - 5.3 mmol/L 4.1   CHLORIDE 98 - 110 mmol/L 105   EXT CHLORIDE 98 - 110 mmol/L 105   CO2 20 - 32 mmol/L 26   EXT CO2 20 - 32 mmol/L 26   CALCIUM 8.6 - 10.3 mg/dL 9.5   EXT CALCIUM 8.6 - 10.3 mg/dL 9.5   PROTEIN, TOTAL 6.1 - 8.1 g/dL 7.4   EXT PROTEIN, TOTAL 6.1 - 8.1 g/dL 7.4   PHOSPHORUS 2.7 - 4.5 mg/dL    ALBUMIN 3.6 - 5.1 g/dL 4.4   EXT ALBUMIN 3.6 - 5.1 g/dL 4.4   TOTAL BILIRUBIN 0.2 - 1.2 mg/dL 0.7   EXT TOTAL BILIRUBIN 0.2 - 1.2 mg/dL 0.7   ALK PHOSPHATASE 40 - 115 U/L 62   EXT ALK PHOSPHATASE 40 - 115 u/l    SGOT (AST) 10 - 35 U/L 20   EXT SGOT (AST) 10 - 35 U/L 20   SGPT (ALT) 9 - 46 U/L 25   EXT ALT 9 - 46 U/L 25     Scans:     MRI Abdomen W WO  Contrast-10/25/19  Narrative: EXAMINATION:  MRI ABDOMEN W WO CONTRAST    CLINICAL HISTORY:  Neoplasm: abdomen, metastatic, recurrence, suspected/known;  Neoplasm of unspecified behavior of other specified sites    TECHNIQUE:  Coronal and axial T2, axial T2 Fiesta, axial T2 fat sat, axial DWI, and T1 in and out of phase as well as postcontrast T1 axial with dynamic fat-sat postcontrast T1 imaging at 2, 4 and 7 minutes.  10 mL of Gadavist contrast was injected intravenously.    COMPARISON:  03/06/2019    FINDINGS:  Postoperative change of the liver are again identified.  There is a relatively stable 0.5 cm hyperenhancing focus within the right lobe of liver segment 4B best identified on axial series 12, image 50.  This is stable in size previously measuring 0.5 cm.  In addition there is a separate similar hyperenhancing focus in the more periphery of the right lobe segment 7 series 12, image 43 difficult to quantify though measures approximately 0.4 cm..  There are continued scattered areas of diffusion hyperintensity elsewhere throughout the liver.    The spleen is and pancreas are stable in size without focal lesion.  No aneurysmal dilatation of the abdominal aorta.  The adrenal glands are within normal limits bilaterally.    Kidneys stable in size without hydronephrosis.    Remote operative change from cholecystectomy again seen.  No evidence for intrahepatic biliary duct dilatation.  Common duct measuring 6 mm previously measuring 8.    No definite free intraperitoneal fluid throughout the abdomen.  There is ventral abdominal incision scar partially visualized.  There is partially visualized hardware in the lumbar spine.  Small bilateral lower pole renal cysts again seen.  Impression: Continued subcentimeter regions of arterial hyperenhancement within the liver unchanged from prior.  In addition there are continued several scattered diffusion hyperintense lesions elsewhere cannot exclude subcentimeter metastases  and remain indeterminate.    There is no evidence for new lesion or increased sized hepatic lesion to suggest worsening metastatic disease.    Please see above for additional details.      Echo-10/25/19  · Normal left ventricular systolic function. The estimated ejection   fraction is 55%  · Normal LV diastolic function.  · Normal right ventricular systolic function.  · Normal central venous pressure (3 mm Hg).  · The estimated PA systolic pressure is 18 mm Hg  · Aortic valve not fully evaluated due to lack of doppler images       CT Abdomen Pelvis With Contrast-10/25/19    CLINICAL HISTORY:  Neoplasm: abdomen, metastatic, recurrence, suspected/known; Neoplasm of unspecified behavior of other specified sites    TECHNIQUE:  Low dose axial images, sagittal and coronal reformations were obtained from the lung bases to the pubic symphysis following the IV administration of 100 mL of Omnipaque 350 and the oral administration of 30 ml of Omnipaque 350.    COMPARISON:  MRI earlier today 10/25/2019 and CT abdomen pelvis 03/06/2019    FINDINGS:  There is no consolidation in the visualized lungs.  There is remote operative change of the liver with surgical clips along the dome the liver.  Continued surgical clips in the gallbladder fossa compatible with prior cholecystectomy.  Additional surgical clips in the right lobe inferiorly.    Adrenal glands are within normal limits.  Spleen and pancreas are normal in size without evidence for focal lesion.  There are few scattered small region of hypo density within the kidneys bilaterally which may represent cysts though too small for further characterization.    There is generalized decreased attenuation of the liver which may represent posttherapy change or fatty infiltration.  There is no focal enhancing lesion throughout the liver to correlate with foci seen on MRI.  Please see MRI report for further details.  The kidneys uptake contrast bilaterally.  No hydronephrosis.  The  pending is not clearly seen and may have been removed no secondary signs to suggest acute appendicitis.    Portal vein is patent as is the IVC.  Nonspecific prostate calcifications.  No aneurysmal dilatation of the abdominal aorta.    Remote operative change with L4 through S1 posterior spinal fusion with L4/L5 and L5/S1 interbody devices.  No evidence for or subluxation of the spine.  There is fracture of the left S1 pedicular screw unchanged from prior.    This report was flagged in Epic as abnormal.  Impression: Remote operative change from cholecystectomy and hepatic lesion resection.  No evidence for focal lesion throughout the liver to correlate with lesion seen on prior MRI.  Please see MRI report for further details.    No acute intra-abdominal findings.  Several scattered renal hypodensity suggestive for cysts.    Stable spinal fusion L4 through S1 with posterior hardware and L4/L5 and L5/S1 interbody fusion devices.  Please note there is stable fracture of the left S1 pedicular screw.  Clinical correlation advised.       Ga 68 PET/CT scan- 2/9/18  Findings: Patient was administered 2.77 mCi of gallium 68 octreotide intravenously. There is a metastatic lesion in the inferior most tip of the right lobe of the liver SUV max 11.49, previously 10.83.    There is a metastatic lesion suspected in the dome of the liver SUV max 6.18, previously 9.51.    There is physiologic intracranial, head, neck activity. Heart and mediastinum are normal. No lymphadenopathy are seen. There is physiologic GI  adrenal and pancreatic activity. Bowel loops are unremarkable. No adenopathy is seen. No bone lesions are seen. There is postoperative change. Lungs are clear.      Impression      Liver metastases with mixed response to therapy.       Assessment/Impression:         Problem List Items Addressed This Visit     None           Plan:         I had a long conversation with the patient about the features of his case that support  the treatment and surveillance recommendations outlined below:  1.  He had a grade 1 well-differentiated midgut carcinoid diagnosed in 2016.   The extent of disease evaluation at that time is unclear, but he was re-explored in 2018 for what sounds like liver metastases.  His most recent cross-sectional imaging shows no evidence of disease progression.  A CT scan the abdomen pelvis is the most effective method for looking at the remnant small bowel, small bowel mesentery, and retro peritoneal lymph node basins.   2.  In patients with liver involvement, the liver is the major  of a neuroendocrine related mortality.  For this reason we keep a very close eye on the liver and have a low threshold for treatment in the setting of progression of disease.  His most recent imaging shows no evidence of disease progression in the liver. I have somatostatin receptor imaging that dates back to 2018 that I believe shows the disease that was removed at that time.  3.  He is on Q 28 day dosing of somatostatin receptor inhibitor therapy.  This would be the standard of care for patients with metastatic midgut carcinoid and is associated with a survival benefit.  He had drug levels drawn on his recent blood work that show therapeutic levels of Lanreotide.  We will continue him on this drug at the current dose.  4.  His previous gallium 68 PET was done in the context of recurrence of his disease.  There are several reasons why we would consider follow-up gallium 68 PET imagin.  New or worsening symptoms that are suggestive but not necessarily characteristic of a neuroendocrine syndrome, 2.  Changes within his blood work that would suggest recurrence or progression of disease, or 3.  Findings on conventional cross-sectional imaging that would suggest recurrence and for which an extent of disease evaluation would be indicated to establish a treatment plan.  5.  He had an echocardiogram that was normal. In patients with  elevations in neuroendocrine markers, there can be changes to the valves of the heart consistent with carcinoid heart disease.  In patients with normalization of there markers post surgery or in patients without elevations in neuroendocrine markers or without a systemic syndrome, the likelihood of seeing changes to follow-up echocardiogram is quite low.  I would hold on future echocardiograms unless something were to change within his blood work.    He is getting 24 hr urine done for 5 HIAA every 3 months.  Since he has no systemic symptoms consistent with a neuroendocrine syndrome and his blood work shows no abnormalities that would suggest a neuroendocrine syndrome, I would hold on further urine studies unless he were to develop symptoms suggestive of carcinoid.    We also discussed elevations in his glucose on a recent blood draw.  The blood work that I have show a glucose level of 135 which was within the normal range.  However by report, he had a random blood glucose level that was as high as 250.  One of the main side effects of somatostatin receptor inhibitor therapy is suppression of pancreatic function and a rise in systemic glucose levels.  In patients that are diabetic or prediabetic this can become problematic and increase the likelihood of having diabetes related complications. However in patients that do not have diabetes or are not prediabetic, a single elevation in a random glucose measurement would not be an indication to change therapy or lower the dose of this medication.  I did explain that there will be times when we have to compromise on the systemic levels of somatostatin receptor inhibitor therapy in patients with diabetes, but I do not believe this is relevant in this case at this juncture.    He has intermittent diarrhea that by description sounds like malabsorption.  This has not been an ongoing issue, has not resulted in any weight loss, and he is not considered a significant problem for  him.  We discussed dietary and behavior modification that can limit malabsorption in the context of neuroendocrine tumors and somatostatin receptor inhibitor therapy.  An additional side effect of Lanreotide is suppression of the pancreatic exocrine function.  If he consumes a meal that exceeds the amount of pancreatic function available, he will struggle to breakdown the food adequately sending under digested food more downstream within the intestinal tract.  This will result in the type of bowel movements that he describes.  If dietary and behavior modification such as eating smaller more frequent meals does not improve the symptoms, we will start patients on pancreatic enzyme supplementation.    CT scan the abdomen pelvis in 6 months  Neuroendocrine blood work in 6 months  Hold on liver MRI  Hold on gallium 68 somatostatin receptor PET  Hold on urine 5 HIAA      Vicki Brewer MD, MPH, FACS  Professor of Surgery, [facility]  Neuroendocrine Surgery, Hepatic/Pancreatic & General Surgical Oncology  [address]  [place]  [address]  ph. [phone]; [phone]  fax. [phone]

## 2019-12-02 DIAGNOSIS — C78.7 SECONDARY MALIGNANT NEOPLASM OF LIVER: ICD-10-CM

## 2019-12-02 DIAGNOSIS — C7A.012 MALIGNANT CARCINOID TUMOR OF THE ILEUM: ICD-10-CM

## 2019-12-02 DIAGNOSIS — C7B.8 SECONDARY NEUROENDOCRINE TUMOR OF DISTANT LYMPH NODES: ICD-10-CM

## 2019-12-03 RX ORDER — CHOLESTYRAMINE 4 G/9G
POWDER, FOR SUSPENSION ORAL
Qty: 60 PACKET | Refills: 11 | Status: SHIPPED | OUTPATIENT
Start: 2019-12-03 | End: 2021-02-05 | Stop reason: SDUPTHER

## 2020-03-24 ENCOUNTER — TELEPHONE (OUTPATIENT)
Dept: NEUROLOGY | Facility: HOSPITAL | Age: 55
End: 2020-03-24

## 2020-03-24 NOTE — TELEPHONE ENCOUNTER
----- Message from Rashmi Higuera sent at 3/24/2020  9:48 AM CDT -----  Contact: self  [phone]  MM - Patient is calling to reschedule his appointments. Please call

## 2020-03-24 NOTE — TELEPHONE ENCOUNTER
Returned patients call, rescheduled patients CT & appointment with Dr. Brewer as he did not want to travel at this time.

## 2020-05-18 LAB

## 2020-05-20 LAB

## 2020-05-27 ENCOUNTER — TELEPHONE (OUTPATIENT)
Dept: NEUROLOGY | Facility: HOSPITAL | Age: 55
End: 2020-05-27

## 2020-05-27 DIAGNOSIS — C7A.012 MALIGNANT CARCINOID TUMOR OF ILEUM: Primary | ICD-10-CM

## 2020-05-27 DIAGNOSIS — C7B.8 SECONDARY NEUROENDOCRINE TUMOR OF LIVER: ICD-10-CM

## 2020-05-27 DIAGNOSIS — C7B.8 SECONDARY NEUROENDOCRINE TUMOR OF DISTANT LYMPH NODES: ICD-10-CM

## 2020-05-27 RX ORDER — LANREOTIDE ACETATE 120 MG/.5ML
120 INJECTION SUBCUTANEOUS
Qty: 1 SYRINGE | Refills: 11 | Status: SHIPPED | OUTPATIENT
Start: 2020-05-27 | End: 2021-12-21

## 2020-05-27 NOTE — TELEPHONE ENCOUNTER
Received a refill request from [facility] for Lanreotide. Refill placed in [facility] and routed to Dr. Miller to sign.

## 2020-05-27 NOTE — TELEPHONE ENCOUNTER
Returned patients call. Rescheduled patients appointment, as he was not able to have his CT & labs done. Patient has no further questions at this time.

## 2020-05-27 NOTE — TELEPHONE ENCOUNTER
----- Message from Rashmi Higuera sent at 5/27/2020  3:18 PM CDT -----  Contact: self [phone]  MM - Patient is calling to reschedule his appointment. Please call

## 2020-06-08 ENCOUNTER — HOSPITAL ENCOUNTER (OUTPATIENT)
Dept: RADIOLOGY | Facility: HOSPITAL | Age: 55
Discharge: HOME | End: 2020-06-08
Attending: SURGERY
Payer: COMMERCIAL

## 2020-06-08 DIAGNOSIS — C7A.8 NEUROENDOCRINE CARCINOMA METASTATIC TO LIVER: ICD-10-CM

## 2020-06-08 DIAGNOSIS — C7B.8 NEUROENDOCRINE CARCINOMA METASTATIC TO LIVER: ICD-10-CM

## 2020-06-08 DIAGNOSIS — C7B.8 SECONDARY NEUROENDOCRINE TUMOR OF DISTANT LYMPH NODES: ICD-10-CM

## 2020-06-08 LAB
CREAT SERPL-MCNC: 1.3 MG/DL (ref 0.5–1.4)
SAMPLE: NORMAL

## 2020-06-08 PROCEDURE — 74177 CT ABD & PELVIS W/CONTRAST: CPT | Mod: 26,,, | Performed by: RADIOLOGY

## 2020-06-08 PROCEDURE — 74177 CT ABD & PELVIS W/CONTRAST: CPT | Mod: TC

## 2020-06-08 PROCEDURE — 74177 CT ABDOMEN PELVIS WITH CONTRAST: ICD-10-PCS | Mod: 26,,, | Performed by: RADIOLOGY

## 2020-06-08 PROCEDURE — 25500020 PHARM REV CODE 255: Performed by: SURGERY

## 2020-06-08 RX ADMIN — IOHEXOL 100 ML: 350 INJECTION, SOLUTION INTRAVENOUS at 08:06

## 2020-06-19 ENCOUNTER — OFFICE VISIT (OUTPATIENT)
Dept: NEUROLOGY | Facility: HOSPITAL | Age: 55
End: 2020-06-19
Attending: SURGERY
Payer: COMMERCIAL

## 2020-06-19 DIAGNOSIS — E34.0 CARCINOID SYNDROME: ICD-10-CM

## 2020-06-19 DIAGNOSIS — Z12.89 ENCOUNTER FOR SCREENING FOR MALIGNANT NEOPLASM OF OTHER SITES: ICD-10-CM

## 2020-06-19 DIAGNOSIS — C7B.8 SECONDARY NEUROENDOCRINE TUMOR OF DISTANT LYMPH NODES: ICD-10-CM

## 2020-06-19 DIAGNOSIS — C7A.012 MALIGNANT CARCINOID TUMOR OF THE ILEUM: ICD-10-CM

## 2020-06-19 DIAGNOSIS — C7B.8 SECONDARY NEUROENDOCRINE TUMOR OF LIVER: Primary | ICD-10-CM

## 2020-06-19 DIAGNOSIS — C7A.012 MALIGNANT CARCINOID TUMOR OF ILEUM: ICD-10-CM

## 2020-06-19 NOTE — PROGRESS NOTES
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 On active treatment    The patient location is: home  The chief complaint leading to consultation is: metastatic midgut carcinoid, on active treatment, symptomatic  Visit type: Virtual visit with audio only (telephone)  Total time spent with patient: 35    The reason for the audio only service rather than synchronous audio and video virtual visit was related to technical difficulties or patient preference/necessity.     Each patient to whom I provide medical services by telemedicine is:  (1) informed of the relationship between the physician and patient and the respective role of any other health care provider with respect to management of the patient; and (2) notified that they may decline to receive medical services by telemedicine and may withdraw from such care at any time. Patient verbally consented to receive this service via voice-only telephone call.    This service was not originating from a related E/M service provided within the previous 7 days nor will  to an E/M service or procedure within the next 24 hours or my soonest available appointment.  Prevailing standard of care was able to be met in this audio-only visit.      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  He had lymph node and liver metastatic involvement at presentation.  He underwent a surgical debulking and has been on somatostatin receptor inhibitor therapy since 2016.    Interval History:   CT A/P  NET BW    Vitals: There were no vitals taken for this visit. 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       Past Surgical History:  Past Surgical History:   Procedure Laterality Date    ABDOMINAL HERNIA REPAIR Bilateral 6/18/2018    Procedure: REPAIR-HERNIA-INCISIONAL-INITIAL with mesh;  Surgeon: NEEL Gould MD;  Location: [facility] OR;  Service: General;  Laterality: Bilateral;    BACK SURGERY      CHOLECYSTECTOMY N/A 6/18/2018    Procedure: CHOLECYSTECTOMY;  Surgeon: NEEL Gould MD;  Location: [facility] OR;  Service: General;  Laterality: N/A;    COLON SURGERY      EXPLORATORY LAPAROTOMY N/A 6/18/2018    Procedure: EXPLORATORY-LAPAROTOMY;  Surgeon: NEEL Gould MD;  Location: [facility] OR;  Service: General;  Laterality: N/A;    KNEE SURGERY      LIVER RESECTION N/A 6/18/2018    Procedure: RESECTION-HEPATIC;  Surgeon: NEEL Gould MD;  Location: [facility] OR;  Service: General;  Laterality: N/A;       Family History:  No family history on file.    Allergies:  Ceftriaxone, Dermabond [tissue glues], and Epinephrine    Medications:  Current Outpatient Medications   Medication Sig    acetaminophen (TYLENOL) 325 MG tablet Take 2 tablets (650 mg total) by mouth every 8 (eight) hours as needed for Pain.    cholestyramine (QUESTRAN) 4 gram packet TAKE 1\2 PACKET BY MOUTH THREE TIMES DAILY WITH MEALS    famotidine (PEPCID) 20 MG tablet Take 1 tablet (20 mg total) by mouth 2 (two) times daily. (Patient not taking: Reported on 11/1/2019)    lanreotide (SOMATULINE DEPOT) 120 mg/0.5 mL Syrg Inject  0.5 mLs (120 mg total) into the skin every 28 days.    metoprolol succinate (TOPROL-XL) 25 MG 24 hr tablet      No current facility-administered medications for this visit.         Review of Systems   Constitutional: Positive for diaphoresis.   HENT: Positive for hearing loss.    Eyes: Positive for visual disturbance.   Respiratory: Positive for shortness of breath.    Cardiovascular: Negative.    Gastrointestinal: Positive for diarrhea.   Endocrine: Negative.    Genitourinary: Negative.    Skin: Positive for rash.        moles   Allergic/Immunologic: Negative.    Neurological: Negative.    Hematological: Negative.    Psychiatric/Behavioral: Negative.           Objective:      Physical Exam  Constitutional:       General: He is not in acute distress.     Appearance: He is well-developed. He is not diaphoretic.   Pulmonary:      Effort: No respiratory distress.      Breath sounds: No stridor.   Neurological:      Mental Status: He is alert and oriented to person, place, and time.   Psychiatric:         Mood and Affect: Mood normal.         Behavior: Behavior normal.         Thought Content: Thought content normal.         Judgment: Judgment normal.          Laboratory Data:    Neuroendocrine Labs Latest Ref Rng & Units 5/20/2020 5/18/2020   5 HIAA BLOOD Up to 22 ng/mL     EXT 5 HIAA BLOOD 0 - 22 ng/mL  10   SEROTONIN <=230 ng/mL     SEROTONIN 56 - 244 ng/mL     EXT SEROTONIN 56 - 244 ng/ml  110   EXT CHROMOGRANIN A 25 - 140 ng/ml     PANCREASTATIN 10 - 135 pg/mL     PANCREASTATIN 10 - 135 pg/mL     EXT PANCREASTATIN KIM 10 - 135 pg/mL 51        Neuroendocrine Labs Latest Ref Rng & Units 9/6/2019 7/23/2019   5 HIAA BLOOD Up to 22 ng/mL 14 35   EXT 5 HIAA BLOOD 0 - 22 ng/mL 14 35 (A)   SEROTONIN <=230 ng/mL     SEROTONIN 56 - 244 ng/mL 90 116   EXT SEROTONIN 56 - 244 ng/ml 90 116   EXT CHROMOGRANIN A 25 - 140 ng/ml     PANCREASTATIN 10 - 135 pg/mL 79 110 (H)   PANCREASTATIN 10 - 135 pg/mL     EXT PANCREASTATIN KIM 10  - 135 pg/mL 79        Neuroendocrine Labs Latest Ref Rng & Units 9/6/2019   EXT 5 HIAA BLOOD 0 - 22 ng/mL 14   EXT SEROTONIN 56 - 244 ng/mL 90   EXT CHROMOGRANIN A 25 - 140 ng/ml    SEROTONIN <=230 ng/mL    PANCREASTATIN 10 - 135 pg/mL    EXT PANCREASTATIN KIM 10 - 135 pg/mL 79   EXT PANCREASTATIN CAMB 0 - 88 pg/mL    NEUROKININ A 0 - 40 pg/mL    EXT NEUROKININ A KIM 0 - 40 pg/mL less than 10   EXT LANREOTIDE LEVEL pg/mL 4,449   EXT SGOT (AST) 10 - 35 U/L 20   SGPT (ALT) 9 - 46 U/L 25   EXT ALT 9 - 46 U/L 25     Scans:     CT Abdomen Pelvis With Contrast  Narrative: EXAMINATION:  CT ABDOMEN PELVIS WITH CONTRAST    CLINICAL HISTORY:  Malignant neuroendocrine tumors; Other secondary neuroendocrine tumors    TECHNIQUE:  Low dose axial images, sagittal and coronal reformations were obtained from the lung bases to the pubic symphysis following the IV administration of 100 mL of Omnipaque 350 .  Oral contrast was not given.    COMPARISON:  CT 10/25/2019 and 03/06/2019 and MRI 10/25/2019    FINDINGS:  Subcentimeter nodular opacity left lower lobe measuring 0.6 cm unchanged from priors.    Right lobe of the liver along the dome and inferiorly again seen.  There is no definite abnormal parenchymal enhancement to suggest focal hepatic lesion.  Please note previous subcentimeter enhancing lesions seen on the MRI are not seen with CT.  Remote operative change from cholecystectomy.  Spleen pancreas and adrenal glands are stable in size without focal lesion.    Kidneys are stable in size with uptake and excretion of contrast bilaterally without hydronephrosis.  Subcentimeter hypodensities within the lower poles of the kidneys most compatible with cysts however most are too small for further characterization although relatively stable.    The appendix is not seen and may have been removed with surgical anastomosis along the expected cecum.    No aneurysmal dilatation of the abdominal aorta.  No free intraperitoneal gas or  fluid.    Remote operative change status post posterior spinal fusion L4 through S1 again seen without evidence for hardware failure.  No evidence for acute fracture or subluxation visualized spine.    Nonspecific prostate calcifications.    Midline ventral incision scar again seen.  Impression: No significant change from prior.  Remote operative change cholecystectomy, right hemicolectomy and prior hepatic lesion resection.    No evidence for new hepatic region of enhancement to suggest focal lesion.  Please note previous subcentimeter enhancing lesion seen on prior MRI are again not seen with CT.    Stable probable bilateral renal cysts.    Please see above for additional details.    Electronically signed by: Eleno Monreal DO  Date:    06/08/2020  Time:    08:49      MRI Abdomen W WO Contrast-10/25/19  Narrative: EXAMINATION:  MRI ABDOMEN W WO CONTRAST    CLINICAL HISTORY:  Neoplasm: abdomen, metastatic, recurrence, suspected/known;  Neoplasm of unspecified behavior of other specified sites    TECHNIQUE:  Coronal and axial T2, axial T2 Fiesta, axial T2 fat sat, axial DWI, and T1 in and out of phase as well as postcontrast T1 axial with dynamic fat-sat postcontrast T1 imaging at 2, 4 and 7 minutes.  10 mL of Gadavist contrast was injected intravenously.    COMPARISON:  03/06/2019    FINDINGS:  Postoperative change of the liver are again identified.  There is a relatively stable 0.5 cm hyperenhancing focus within the right lobe of liver segment 4B best identified on axial series 12, image 50.  This is stable in size previously measuring 0.5 cm.  In addition there is a separate similar hyperenhancing focus in the more periphery of the right lobe segment 7 series 12, image 43 difficult to quantify though measures approximately 0.4 cm..  There are continued scattered areas of diffusion hyperintensity elsewhere throughout the liver.    The spleen is and pancreas are stable in size without focal lesion.  No aneurysmal  dilatation of the abdominal aorta.  The adrenal glands are within normal limits bilaterally.    Kidneys stable in size without hydronephrosis.    Remote operative change from cholecystectomy again seen.  No evidence for intrahepatic biliary duct dilatation.  Common duct measuring 6 mm previously measuring 8.    No definite free intraperitoneal fluid throughout the abdomen.  There is ventral abdominal incision scar partially visualized.  There is partially visualized hardware in the lumbar spine.  Small bilateral lower pole renal cysts again seen.  Impression: Continued subcentimeter regions of arterial hyperenhancement within the liver unchanged from prior.  In addition there are continued several scattered diffusion hyperintense lesions elsewhere cannot exclude subcentimeter metastases and remain indeterminate.    There is no evidence for new lesion or increased sized hepatic lesion to suggest worsening metastatic disease.    Echo-10/25/19  · Normal left ventricular systolic function. The estimated ejection   fraction is 55%  · Normal LV diastolic function.  · Normal right ventricular systolic function.  · Normal central venous pressure (3 mm Hg).  · The estimated PA systolic pressure is 18 mm Hg  · Aortic valve not fully evaluated due to lack of doppler images        Ga 68 PET/CT scan- 2/9/18  Findings: Patient was administered 2.77 mCi of gallium 68 octreotide intravenously. There is a metastatic lesion in the inferior most tip of the right lobe of the liver SUV max 11.49, previously 10.83.    There is a metastatic lesion suspected in the dome of the liver SUV max 6.18, previously 9.51.    There is physiologic intracranial, head, neck activity. Heart and mediastinum are normal. No lymphadenopathy are seen. There is physiologic GI  adrenal and pancreatic activity. Bowel loops are unremarkable. No adenopathy is seen. No bone lesions are seen. There is postoperative change. Lungs are clear.      Impression       Liver metastases with mixed response to therapy.       Assessment/Impression:         Problem List Items Addressed This Visit        Oncology    Secondary neuroendocrine tumor of liver - Primary    Relevant Orders    Pancreastatin    Serotonin serum    Comprehensive metabolic panel    Lanreotide Drug Levels    Secondary neuroendocrine tumor of distant lymph nodes    Relevant Orders    Pancreastatin    Serotonin serum    Comprehensive metabolic panel    Lanreotide Drug Levels    Carcinoid tumor    Relevant Orders    Pancreastatin    Serotonin serum    Comprehensive metabolic panel    Lanreotide Drug Levels       Endocrine    Carcinoid syndrome    Relevant Orders    Pancreastatin    Serotonin serum    Comprehensive metabolic panel    Lanreotide Drug Levels      Other Visit Diagnoses     Encounter for screening for malignant neoplasm of other sites        Relevant Orders    CT Abdomen Pelvis With Contrast           Plan:       I had a long conversation with the patient about the features of his case that support the treatment and surveillance recommendations outlined below:  1.  He and I initially met in November of last year.  At that time I reviewed the natural history of a low-grade, well-differentiated, metastatic midgut carcinoid tumor.  The favorable aspects of the disease include a slow in indolent biologic behavior, patients live a long time even in the setting of residual and/or metastatic disease, and even when diagnosed at a young age, patients are more likely than not going to live to die of other causes.  The less favorable aspects of the disease are an ongoing risk of slow progression of residual tumor burden over the course of one's remaining lifetime, the need to be on some form of systemic therapy for what can be years to decades, and in patients who struggle with symptoms related to a neuroendocrine syndrome or as a consequence of the surgery required to treat the primary site there is significant  dietary and behavior modification required to keep those symptoms in check.  His cross-sectional imaging has been underwhelming during surveillance consistent with the favorable aspects of the disease.  However he still struggles with symptoms likely related to a carcinoid syndrome.  2.  He had a CT scan of the abdomen pelvis prior to this visit.  It shows overall stable changes compared to prior.  CT scan the abdomen pelvis with contrast remain the standard surveillance stool for abdominal neuroendocrine tumors.  In his case we may consider a liver MRI to better evaluate the known residual disease within his liver as this is of more value in trying to evaluate subtle signs of progression in the site.  His last liver MRI was in October of last year.  It showed stable disease compared to a prior MRI.  3.  He is on standard of care somatostatin receptor inhibitor therapy with Lanreotide.  With no signs of progression, we would have no reason to change systemic treatment recommendations at this juncture  4.  He had a gallium 68 PET in 2018.  This showed multifocal somatostatin avid disease in his liver with varying degrees of avidity.  They called a mixed response to treatment.  I reviewed with him at this visit the handful of reasons I would consider getting follow-up somatostatin receptor PET imagin.  New or worsening symptoms suggestive of an evolution of a neuroendocrine syndrome, 2.  Changes in blood work that would suggest a serologic progression of disease, or 3.  Findings on conventional cross-sectional imaging that would suggest progression and for which an extent of disease evaluation would be required to put treatment options in perspective.      He and I had previously discussed symptoms that sound much like malabsorption.  He went on to 1 packet per day of cholestyramine and some sublingual B12.  The cholestyramine did improve his diarrhea and therefore the etiology was more likely to be bile  salt reabsorption issues.  There is little downside to taking small doses of cholestyramine to better control symptoms related to the surgery done to remove his primary.  B12 deficiencies are common in patients that have there ileum removed.  Sublingual B12 is any easy supplement to add.  Barring any symptoms that come up in the next several months, I would plan on seeing him back with imaging and blood work in 6 months.    CT scan the abdomen pelvis in 6 months  Neuroendocrine blood work in 6 months  Continue cholestyramine and B12 as prescribed  Continue to hold on echocardiograms for this indication    Vicki Brewer MD, MPH, FACS  Professor of Surgery, [facility]  Neuroendocrine Surgery, Hepatic/Pancreatic & General Surgical Oncology  [address], [address]  [place]  [address]  ph. [phone]; [phone]  fax. [phone] Patient - Audio Only Telehealth Visit

## 2020-06-22 ENCOUNTER — DOCUMENTATION ONLY (OUTPATIENT)
Dept: NEUROLOGY | Facility: HOSPITAL | Age: 55
End: 2020-06-22

## 2020-06-22 NOTE — PROGRESS NOTES
Mailed patient a copy of clinic note, after visit summary and updated lab orders from 6/19/2020 appointment with Dr Brewer.

## 2021-02-05 DIAGNOSIS — C7A.012 MALIGNANT CARCINOID TUMOR OF THE ILEUM: ICD-10-CM

## 2021-02-05 DIAGNOSIS — C78.7 SECONDARY MALIGNANT NEOPLASM OF LIVER: ICD-10-CM

## 2021-02-05 DIAGNOSIS — C7B.8 SECONDARY NEUROENDOCRINE TUMOR OF DISTANT LYMPH NODES: ICD-10-CM

## 2021-02-05 RX ORDER — CHOLESTYRAMINE 4 G/9G
POWDER, FOR SUSPENSION ORAL
Qty: 60 PACKET | Refills: 11 | Status: SHIPPED | OUTPATIENT
Start: 2021-02-05 | End: 2022-03-31 | Stop reason: SDUPTHER

## 2021-05-06 ENCOUNTER — TELEPHONE (OUTPATIENT)
Dept: NEUROLOGY | Facility: HOSPITAL | Age: 56
End: 2021-05-06

## 2021-05-14 ENCOUNTER — HOSPITAL ENCOUNTER (OUTPATIENT)
Dept: RADIOLOGY | Facility: HOSPITAL | Age: 56
Discharge: HOME | End: 2021-05-14
Attending: SURGERY
Payer: COMMERCIAL

## 2021-05-14 DIAGNOSIS — Z12.89 ENCOUNTER FOR SCREENING FOR MALIGNANT NEOPLASM OF OTHER SITES: ICD-10-CM

## 2021-05-14 PROCEDURE — 74177 CT ABD & PELVIS W/CONTRAST: CPT | Mod: 26,,, | Performed by: RADIOLOGY

## 2021-05-14 PROCEDURE — 74177 CT ABD & PELVIS W/CONTRAST: CPT | Mod: TC

## 2021-05-14 PROCEDURE — 74177 CT ABDOMEN PELVIS WITH CONTRAST: ICD-10-PCS | Mod: 26,,, | Performed by: RADIOLOGY

## 2021-05-14 PROCEDURE — A9698 NON-RAD CONTRAST MATERIALNOC: HCPCS | Performed by: SURGERY

## 2021-05-14 PROCEDURE — 25500020 PHARM REV CODE 255: Performed by: SURGERY

## 2021-05-14 RX ADMIN — IOHEXOL 100 ML: 350 INJECTION, SOLUTION INTRAVENOUS at 10:05

## 2021-05-14 RX ADMIN — IOHEXOL 1000 ML: 12 SOLUTION ORAL at 09:05

## 2021-06-09 ENCOUNTER — TELEPHONE (OUTPATIENT)
Dept: NEUROLOGY | Facility: HOSPITAL | Age: 56
End: 2021-06-09

## 2021-06-11 ENCOUNTER — OFFICE VISIT (OUTPATIENT)
Dept: NEUROLOGY | Facility: HOSPITAL | Age: 56
End: 2021-06-11
Attending: SURGERY
Payer: COMMERCIAL

## 2021-06-11 VITALS
BODY MASS INDEX: 30.62 KG/M2 | TEMPERATURE: 98 F | DIASTOLIC BLOOD PRESSURE: 78 MMHG | HEART RATE: 49 BPM | RESPIRATION RATE: 16 BRPM | WEIGHT: 213.38 LBS | SYSTOLIC BLOOD PRESSURE: 118 MMHG

## 2021-06-11 DIAGNOSIS — K90.89 BILE SALT-INDUCED DIARRHEA: ICD-10-CM

## 2021-06-11 DIAGNOSIS — C7B.8 SECONDARY NEUROENDOCRINE TUMOR OF LIVER: ICD-10-CM

## 2021-06-11 DIAGNOSIS — C7A.012 MALIGNANT CARCINOID TUMOR OF THE ILEUM: Primary | ICD-10-CM

## 2021-06-11 DIAGNOSIS — E34.0 CARCINOID SYNDROME: ICD-10-CM

## 2021-06-11 DIAGNOSIS — C7B.8 SECONDARY NEUROENDOCRINE TUMOR OF DISTANT LYMPH NODES: ICD-10-CM

## 2021-06-11 PROCEDURE — 99213 OFFICE O/P EST LOW 20 MIN: CPT | Performed by: SURGERY

## 2021-06-13 PROBLEM — D3A.00 CARCINOID TUMOR: Status: RESOLVED | Noted: 2018-06-18 | Resolved: 2021-06-13

## 2021-06-18 ENCOUNTER — TELEPHONE (OUTPATIENT)
Dept: NEUROLOGY | Facility: HOSPITAL | Age: 56
End: 2021-06-18

## 2021-06-18 DIAGNOSIS — C7A.012 MALIGNANT CARCINOID TUMOR OF THE ILEUM: ICD-10-CM

## 2021-06-18 DIAGNOSIS — C7B.8 SECONDARY NEUROENDOCRINE TUMOR OF LIVER: Primary | ICD-10-CM

## 2021-06-25 ENCOUNTER — TELEPHONE (OUTPATIENT)
Dept: NEUROLOGY | Facility: HOSPITAL | Age: 56
End: 2021-06-25

## 2021-06-28 ENCOUNTER — TELEPHONE (OUTPATIENT)
Dept: NEUROLOGY | Facility: HOSPITAL | Age: 56
End: 2021-06-28

## 2021-06-28 DIAGNOSIS — C7B.8 SECONDARY NEUROENDOCRINE TUMOR OF DISTANT LYMPH NODES: ICD-10-CM

## 2021-06-28 DIAGNOSIS — C7A.012 MALIGNANT CARCINOID TUMOR OF THE ILEUM: ICD-10-CM

## 2021-06-28 DIAGNOSIS — E34.0 CARCINOID SYNDROME: ICD-10-CM

## 2021-06-28 DIAGNOSIS — C7B.8 SECONDARY NEUROENDOCRINE TUMOR OF LIVER: Primary | ICD-10-CM

## 2021-09-21 ENCOUNTER — TELEPHONE (OUTPATIENT)
Dept: DERMATOLOGY | Facility: CLINIC | Age: 56
End: 2021-09-21

## 2021-12-03 ENCOUNTER — TELEPHONE (OUTPATIENT)
Dept: NEUROLOGY | Facility: HOSPITAL | Age: 56
End: 2021-12-03
Payer: COMMERCIAL

## 2021-12-21 ENCOUNTER — HOSPITAL ENCOUNTER (OUTPATIENT)
Dept: RADIOLOGY | Facility: HOSPITAL | Age: 56
Discharge: HOME | End: 2021-12-21
Attending: SURGERY
Payer: COMMERCIAL

## 2021-12-21 ENCOUNTER — OFFICE VISIT (OUTPATIENT)
Dept: NEUROLOGY | Facility: HOSPITAL | Age: 56
End: 2021-12-21
Attending: SURGERY
Payer: COMMERCIAL

## 2021-12-21 VITALS
HEART RATE: 68 BPM | WEIGHT: 217.94 LBS | DIASTOLIC BLOOD PRESSURE: 76 MMHG | BODY MASS INDEX: 31.2 KG/M2 | TEMPERATURE: 98 F | SYSTOLIC BLOOD PRESSURE: 123 MMHG | HEIGHT: 70 IN | RESPIRATION RATE: 20 BRPM

## 2021-12-21 DIAGNOSIS — C7B.8 SECONDARY NEUROENDOCRINE TUMOR OF LIVER: Primary | ICD-10-CM

## 2021-12-21 DIAGNOSIS — C7B.8 SECONDARY NEUROENDOCRINE TUMOR OF LIVER: ICD-10-CM

## 2021-12-21 DIAGNOSIS — C7A.012 MALIGNANT CARCINOID TUMOR OF THE ILEUM: ICD-10-CM

## 2021-12-21 DIAGNOSIS — E34.0 CARCINOID SYNDROME: ICD-10-CM

## 2021-12-21 DIAGNOSIS — C7B.8 SECONDARY NEUROENDOCRINE TUMOR OF DISTANT LYMPH NODES: ICD-10-CM

## 2021-12-21 LAB
CREAT SERPL-MCNC: 1.2 MG/DL (ref 0.5–1.4)
SAMPLE: NORMAL

## 2021-12-21 PROCEDURE — 25500020 PHARM REV CODE 255: Performed by: SURGERY

## 2021-12-21 PROCEDURE — 74177 CT ABDOMEN PELVIS WITH CONTRAST: ICD-10-PCS | Mod: 26,,, | Performed by: STUDENT IN AN ORGANIZED HEALTH CARE EDUCATION/TRAINING PROGRAM

## 2021-12-21 PROCEDURE — 74177 CT ABD & PELVIS W/CONTRAST: CPT | Mod: 26,,, | Performed by: STUDENT IN AN ORGANIZED HEALTH CARE EDUCATION/TRAINING PROGRAM

## 2021-12-21 PROCEDURE — 74177 CT ABD & PELVIS W/CONTRAST: CPT | Mod: TC

## 2021-12-21 PROCEDURE — 99213 OFFICE O/P EST LOW 20 MIN: CPT | Mod: 25 | Performed by: SURGERY

## 2021-12-21 RX ADMIN — IOHEXOL 100 ML: 350 INJECTION, SOLUTION INTRAVENOUS at 09:12

## 2022-03-30 ENCOUNTER — PATIENT MESSAGE (OUTPATIENT)
Dept: NEUROLOGY | Facility: HOSPITAL | Age: 57
End: 2022-03-30
Payer: COMMERCIAL

## 2022-03-31 DIAGNOSIS — C78.7 SECONDARY MALIGNANT NEOPLASM OF LIVER: ICD-10-CM

## 2022-03-31 DIAGNOSIS — C7B.8 SECONDARY NEUROENDOCRINE TUMOR OF DISTANT LYMPH NODES: ICD-10-CM

## 2022-03-31 DIAGNOSIS — C7A.012 MALIGNANT CARCINOID TUMOR OF THE ILEUM: ICD-10-CM

## 2022-03-31 RX ORDER — CHOLESTYRAMINE 4 G/9G
POWDER, FOR SUSPENSION ORAL
Qty: 60 PACKET | Refills: 11 | Status: SHIPPED | OUTPATIENT
Start: 2022-03-31

## 2022-03-31 NOTE — TELEPHONE ENCOUNTER
Patient requested a refill for Questran via a portal message. Refill placed and routed to Dr. Brewer to sign. Patient notified.

## 2022-06-27 ENCOUNTER — TELEPHONE (OUTPATIENT)
Dept: NEUROLOGY | Facility: HOSPITAL | Age: 57
End: 2022-06-27
Payer: COMMERCIAL

## 2022-06-27 NOTE — TELEPHONE ENCOUNTER
----- Message from Tazbranden Edin sent at 6/27/2022 12:32 PM CDT -----  Contact: [phone]  Who Called: PT  Regarding: he was a pt of  but now wants to schedule with , he wants to speak with  nurse for more info   Would the patient rather a call back or a response via [facility]? Call back  Best Call Back Number: [phone]   Additional Information: n/a

## 2022-06-27 NOTE — TELEPHONE ENCOUNTER
Spoke with patient, educated that Dr. Brewer & Dr. Gould are no longer practicing at [facility]. Patient verbalized his understanding and stated he will continuing seeing Dr. Gould at his new location.

## 2022-07-22 ENCOUNTER — TELEPHONE (OUTPATIENT)
Dept: HEMATOLOGY/ONCOLOGY | Facility: CLINIC | Age: 57
End: 2022-07-22
Payer: COMMERCIAL

## 2022-08-25 ENCOUNTER — PATIENT OUTREACH (OUTPATIENT)
Dept: ADMINISTRATIVE | Facility: HOSPITAL | Age: 57
End: 2022-08-25
Payer: COMMERCIAL

## 2022-08-25 ENCOUNTER — PATIENT MESSAGE (OUTPATIENT)
Dept: ADMINISTRATIVE | Facility: HOSPITAL | Age: 57
End: 2022-08-25
Payer: COMMERCIAL

## 2022-08-25 NOTE — PROGRESS NOTES
Pre-Visit Chart Review  For Appointment Scheduled on 9/8/2022    Health Maintenance Due   Topic    Hepatitis C Screening     Pneumococcal Vaccines (Age 0-64) (1 - PCV)    HIV Screening     Shingles Vaccine (1 of 2)    Colorectal Cancer Screening     COVID-19 Vaccine (3 - Booster for Moderna series)

## 2022-09-09 ENCOUNTER — PATIENT OUTREACH (OUTPATIENT)
Dept: ADMINISTRATIVE | Facility: HOSPITAL | Age: 57
End: 2022-09-09
Payer: COMMERCIAL

## 2022-09-09 ENCOUNTER — LAB VISIT (OUTPATIENT)
Dept: LAB | Facility: HOSPITAL | Age: 57
End: 2022-09-09
Payer: COMMERCIAL

## 2022-09-09 ENCOUNTER — OFFICE VISIT (OUTPATIENT)
Dept: FAMILY MEDICINE | Facility: CLINIC | Age: 57
End: 2022-09-09
Payer: COMMERCIAL

## 2022-09-09 VITALS
OXYGEN SATURATION: 98 % | BODY MASS INDEX: 33.08 KG/M2 | HEIGHT: 70 IN | DIASTOLIC BLOOD PRESSURE: 88 MMHG | HEART RATE: 60 BPM | WEIGHT: 231.06 LBS | SYSTOLIC BLOOD PRESSURE: 120 MMHG

## 2022-09-09 DIAGNOSIS — Z11.59 NEED FOR HEPATITIS C SCREENING TEST: ICD-10-CM

## 2022-09-09 DIAGNOSIS — C7B.8 SECONDARY NEUROENDOCRINE TUMOR OF LIVER: ICD-10-CM

## 2022-09-09 DIAGNOSIS — Z00.00 WELL ADULT EXAM: ICD-10-CM

## 2022-09-09 DIAGNOSIS — C7B.8 SECONDARY NEUROENDOCRINE TUMOR OF DISTANT LYMPH NODES: ICD-10-CM

## 2022-09-09 DIAGNOSIS — E34.0 CARCINOID SYNDROME: ICD-10-CM

## 2022-09-09 DIAGNOSIS — R73.03 PREDIABETES: ICD-10-CM

## 2022-09-09 DIAGNOSIS — C7A.012 MALIGNANT CARCINOID TUMOR OF THE ILEUM: ICD-10-CM

## 2022-09-09 DIAGNOSIS — Z11.4 SCREENING FOR HIV (HUMAN IMMUNODEFICIENCY VIRUS): ICD-10-CM

## 2022-09-09 DIAGNOSIS — Z00.00 WELL ADULT EXAM: Primary | ICD-10-CM

## 2022-09-09 DIAGNOSIS — M72.0 PALMAR FIBROMATOSIS: ICD-10-CM

## 2022-09-09 PROBLEM — E29.1 MALE HYPOGONADISM: Status: ACTIVE | Noted: 2020-05-07

## 2022-09-09 PROBLEM — E11.9 TYPE 2 DIABETES MELLITUS WITHOUT COMPLICATION, WITHOUT LONG-TERM CURRENT USE OF INSULIN: Status: ACTIVE | Noted: 2020-05-12

## 2022-09-09 PROBLEM — R79.89 LOW TESTOSTERONE: Status: ACTIVE | Noted: 2020-06-01

## 2022-09-09 PROBLEM — Z90.49 S/P RIGHT COLECTOMY: Status: ACTIVE | Noted: 2020-05-08

## 2022-09-09 PROBLEM — N52.1 ERECTILE DYSFUNCTION DUE TO DISEASES CLASSIFIED ELSEWHERE: Status: ACTIVE | Noted: 2020-06-01

## 2022-09-09 LAB
ALBUMIN SERPL BCP-MCNC: 4.4 G/DL (ref 3.5–5.2)
ALP SERPL-CCNC: 49 U/L (ref 55–135)
ALT SERPL W/O P-5'-P-CCNC: 21 U/L (ref 10–44)
ANION GAP SERPL CALC-SCNC: 10 MMOL/L (ref 8–16)
AST SERPL-CCNC: 21 U/L (ref 10–40)
BILIRUB SERPL-MCNC: 0.6 MG/DL (ref 0.1–1)
BUN SERPL-MCNC: 17 MG/DL (ref 6–20)
CALCIUM SERPL-MCNC: 9.5 MG/DL (ref 8.7–10.5)
CHLORIDE SERPL-SCNC: 108 MMOL/L (ref 95–110)
CHOLEST SERPL-MCNC: 184 MG/DL (ref 120–199)
CHOLEST/HDLC SERPL: 3.8 {RATIO} (ref 2–5)
CO2 SERPL-SCNC: 22 MMOL/L (ref 23–29)
COMPLEXED PSA SERPL-MCNC: 0.76 NG/ML (ref 0–4)
CREAT SERPL-MCNC: 1 MG/DL (ref 0.5–1.4)
EST. GFR  (NO RACE VARIABLE): >60 ML/MIN/1.73 M^2
ESTIMATED AVG GLUCOSE: 117 MG/DL (ref 68–131)
GLUCOSE SERPL-MCNC: 105 MG/DL (ref 70–110)
HBA1C MFR BLD: 5.7 % (ref 4–5.6)
HCV AB SERPL QL IA: NORMAL
HDLC SERPL-MCNC: 49 MG/DL (ref 40–75)
HDLC SERPL: 26.6 % (ref 20–50)
HIV 1+2 AB+HIV1 P24 AG SERPL QL IA: NORMAL
LDLC SERPL CALC-MCNC: 106.8 MG/DL (ref 63–159)
NONHDLC SERPL-MCNC: 135 MG/DL
POTASSIUM SERPL-SCNC: 4.4 MMOL/L (ref 3.5–5.1)
PROT SERPL-MCNC: 7.8 G/DL (ref 6–8.4)
SODIUM SERPL-SCNC: 140 MMOL/L (ref 136–145)
TRIGL SERPL-MCNC: 141 MG/DL (ref 30–150)
TSH SERPL DL<=0.005 MIU/L-ACNC: 0.76 UIU/ML (ref 0.4–4)

## 2022-09-09 PROCEDURE — 1159F PR MEDICATION LIST DOCUMENTED IN MEDICAL RECORD: ICD-10-PCS | Mod: CPTII,S$GLB,, | Performed by: FAMILY MEDICINE

## 2022-09-09 PROCEDURE — 3079F DIAST BP 80-89 MM HG: CPT | Mod: CPTII,S$GLB,, | Performed by: FAMILY MEDICINE

## 2022-09-09 PROCEDURE — 84260 ASSAY OF SEROTONIN: CPT | Performed by: SURGERY

## 2022-09-09 PROCEDURE — 3079F PR MOST RECENT DIASTOLIC BLOOD PRESSURE 80-89 MM HG: ICD-10-PCS | Mod: CPTII,S$GLB,, | Performed by: FAMILY MEDICINE

## 2022-09-09 PROCEDURE — 80061 LIPID PANEL: CPT | Performed by: FAMILY MEDICINE

## 2022-09-09 PROCEDURE — 87389 HIV-1 AG W/HIV-1&-2 AB AG IA: CPT | Performed by: FAMILY MEDICINE

## 2022-09-09 PROCEDURE — 3008F PR BODY MASS INDEX (BMI) DOCUMENTED: ICD-10-PCS | Mod: CPTII,S$GLB,, | Performed by: FAMILY MEDICINE

## 2022-09-09 PROCEDURE — 99386 PREV VISIT NEW AGE 40-64: CPT | Mod: S$GLB,,, | Performed by: FAMILY MEDICINE

## 2022-09-09 PROCEDURE — 36415 COLL VENOUS BLD VENIPUNCTURE: CPT | Mod: PO | Performed by: FAMILY MEDICINE

## 2022-09-09 PROCEDURE — 99386 PR PREVENTIVE VISIT,NEW,40-64: ICD-10-PCS | Mod: S$GLB,,, | Performed by: FAMILY MEDICINE

## 2022-09-09 PROCEDURE — 3008F BODY MASS INDEX DOCD: CPT | Mod: CPTII,S$GLB,, | Performed by: FAMILY MEDICINE

## 2022-09-09 PROCEDURE — 99999 PR PBB SHADOW E&M-EST. PATIENT-LVL IV: CPT | Mod: PBBFAC,,, | Performed by: FAMILY MEDICINE

## 2022-09-09 PROCEDURE — 86803 HEPATITIS C AB TEST: CPT | Performed by: FAMILY MEDICINE

## 2022-09-09 PROCEDURE — 1159F MED LIST DOCD IN RCRD: CPT | Mod: CPTII,S$GLB,, | Performed by: FAMILY MEDICINE

## 2022-09-09 PROCEDURE — 3074F SYST BP LT 130 MM HG: CPT | Mod: CPTII,S$GLB,, | Performed by: FAMILY MEDICINE

## 2022-09-09 PROCEDURE — 80053 COMPREHEN METABOLIC PANEL: CPT | Performed by: FAMILY MEDICINE

## 2022-09-09 PROCEDURE — 83036 HEMOGLOBIN GLYCOSYLATED A1C: CPT | Performed by: FAMILY MEDICINE

## 2022-09-09 PROCEDURE — 99999 PR PBB SHADOW E&M-EST. PATIENT-LVL IV: ICD-10-PCS | Mod: PBBFAC,,, | Performed by: FAMILY MEDICINE

## 2022-09-09 PROCEDURE — 3074F PR MOST RECENT SYSTOLIC BLOOD PRESSURE < 130 MM HG: ICD-10-PCS | Mod: CPTII,S$GLB,, | Performed by: FAMILY MEDICINE

## 2022-09-09 PROCEDURE — 84153 ASSAY OF PSA TOTAL: CPT | Performed by: FAMILY MEDICINE

## 2022-09-09 PROCEDURE — 84443 ASSAY THYROID STIM HORMONE: CPT | Performed by: FAMILY MEDICINE

## 2022-09-09 PROCEDURE — 1160F RVW MEDS BY RX/DR IN RCRD: CPT | Mod: CPTII,S$GLB,, | Performed by: FAMILY MEDICINE

## 2022-09-09 PROCEDURE — 1160F PR REVIEW ALL MEDS BY PRESCRIBER/CLIN PHARMACIST DOCUMENTED: ICD-10-PCS | Mod: CPTII,S$GLB,, | Performed by: FAMILY MEDICINE

## 2022-09-09 RX ORDER — VITAMIN B COMPLEX
1 CAPSULE ORAL DAILY
COMMUNITY

## 2022-09-09 NOTE — PROGRESS NOTES
"Subjective:       Patient ID: Omid Falcon is a 56 y.o. male.    Chief Complaint: Establish Care (Annual; visit ) and Tingling (Right hand (thumb) )    Here today to establish care with a new PCP.   He was seeing Dr. Pfeiffer who has retired.   Patient here today for annual well adult exam.    Tries to eat a healthy diet.  Exercises regularly.  No tobacco  Immunizations.  Tdap 2016, Shingrix x 1, defers flu  Last Lab Work: 2022  Colon Ca screening: Colonoscopy 2016 (Dr. Celso Nesbitt at [place])  Prostate Ca Screening: PSA 2020     Neuroendocrine Tumor : of bowel.  Seeing Heme/Onc.  He has had 2 different surgeries.  Was on medication, currently off.  Getting care at [facility].    Hyperglycemia:  elevated in past due to medication.  His last HgA1c was 5.8    Review of Systems   Constitutional:  Negative for appetite change, fatigue and fever.   HENT:  Negative for congestion, sneezing and sore throat.    Respiratory:  Negative for cough, shortness of breath and wheezing.    Cardiovascular:  Negative for chest pain and palpitations.   Gastrointestinal:  Negative for abdominal pain, constipation, diarrhea, nausea and vomiting.   Genitourinary:  Negative for difficulty urinating, dysuria, frequency and hematuria.   Musculoskeletal:         Numbness of thumb   Neurological:  Negative for dizziness, syncope, weakness and headaches.   Psychiatric/Behavioral:  Negative for agitation, behavioral problems and confusion. The patient is not nervous/anxious.      Objective:      Vitals:    09/09/22 1117   BP: 120/88   Pulse: 60   SpO2: 98%   Weight: 104.8 kg (231 lb 0.7 oz)   Height: 5' 10" (1.778 m)      Physical Exam  Constitutional:       General: He is not in acute distress.  Cardiovascular:      Rate and Rhythm: Normal rate and regular rhythm.      Heart sounds: Normal heart sounds. No murmur heard.  Pulmonary:      Effort: Pulmonary effort is normal. No respiratory distress.      Breath sounds: Normal breath sounds. No " wheezing, rhonchi or rales.   Musculoskeletal:         General: No swelling.      Right hand: Swelling (fibrous nodule by thumb) present. No tenderness.   Skin:     General: Skin is warm and dry.   Neurological:      General: No focal deficit present.      Mental Status: He is alert.   Psychiatric:         Mood and Affect: Mood normal.         Behavior: Behavior normal.         Thought Content: Thought content normal.       Results for orders placed or performed during the hospital encounter of 12/21/21   ISTAT CREATININE   Result Value Ref Range    POC Creatinine 1.2 0.5 - 1.4 mg/dL    Sample VENOUS       Assessment:       1. Well adult exam    2. Malignant carcinoid tumor of the ileum    3. Need for hepatitis C screening test    4. Screening for HIV (human immunodeficiency virus)    5. Prediabetes    6. Palmar fibromatosis        Plan:       Well adult exam  -     Comprehensive Metabolic Panel; Future; Expected date: 09/09/2022  -     Hemoglobin A1C; Future; Expected date: 09/09/2022  -     Lipid Panel; Future; Expected date: 09/09/2022  -     PSA, Screening; Future; Expected date: 09/09/2022  -     TSH; Future; Expected date: 09/09/2022  -     Urinalysis, Reflex to Urine Culture Urine, Clean Catch; Future; Expected date: 09/09/2022  Continue to work on dietary improvements (decrease overall calorie intake, decrease sugar and carb intake, decrease animal protein intake)  Continue to exercise at least 30-40 minutes, 3 times per week  Immunizations were discussed and were up to date  Preventative exams were discussed and PSA today    Malignant carcinoid tumor of the ileum  Management per Heme/Onc  Need for hepatitis C screening test  -     Hepatitis C Antibody; Future; Expected date: 09/09/2022    Screening for HIV (human immunodeficiency virus)  -     HIV 1/2 Ag/Ab (4th Gen); Future; Expected date: 09/09/2022    Prediabetes  -     Hemoglobin A1C; Future; Expected date: 09/09/2022  Recheck HgA1c at this time  Palmar  fibromatosis  -     Ambulatory referral/consult to Orthopedics; Future; Expected date: 09/16/2022  Referal to Ortho today.      Medication List with Changes/Refills   Current Medications    ACETAMINOPHEN (TYLENOL) 325 MG TABLET    Take 2 tablets (650 mg total) by mouth every 8 (eight) hours as needed for Pain.    B COMPLEX VITAMINS CAPSULE    Take 1 capsule by mouth once daily.    BLOOD SUGAR DIAGNOSTIC STRP    1 strip by Other route.    CHOLESTYRAMINE (QUESTRAN) 4 GRAM PACKET    TAKE 1\2 PACKET BY MOUTH THREE TIMES DAILY WITH MEALS    MULTIVITAMIN CAPSULE    Take 1 capsule by mouth once daily.

## 2022-09-13 ENCOUNTER — PATIENT MESSAGE (OUTPATIENT)
Dept: ADMINISTRATIVE | Facility: HOSPITAL | Age: 57
End: 2022-09-13
Payer: COMMERCIAL

## 2022-09-14 LAB — SEROTONIN: 136 NG/ML

## 2022-10-03 ENCOUNTER — OFFICE VISIT (OUTPATIENT)
Dept: ORTHOPEDICS | Facility: CLINIC | Age: 57
End: 2022-10-03
Payer: COMMERCIAL

## 2022-10-03 DIAGNOSIS — G56.01 CARPAL TUNNEL SYNDROME ON RIGHT: ICD-10-CM

## 2022-10-03 DIAGNOSIS — M72.0 PALMAR FIBROMATOSIS: ICD-10-CM

## 2022-10-03 DIAGNOSIS — M65.311 TRIGGER FINGER OF RIGHT THUMB: Primary | ICD-10-CM

## 2022-10-03 PROCEDURE — 99999 PR PBB SHADOW E&M-EST. PATIENT-LVL III: ICD-10-PCS | Mod: PBBFAC,,, | Performed by: ORTHOPAEDIC SURGERY

## 2022-10-03 PROCEDURE — 1159F MED LIST DOCD IN RCRD: CPT | Mod: CPTII,S$GLB,, | Performed by: ORTHOPAEDIC SURGERY

## 2022-10-03 PROCEDURE — 99203 OFFICE O/P NEW LOW 30 MIN: CPT | Mod: 25,S$GLB,, | Performed by: ORTHOPAEDIC SURGERY

## 2022-10-03 PROCEDURE — 1159F PR MEDICATION LIST DOCUMENTED IN MEDICAL RECORD: ICD-10-PCS | Mod: CPTII,S$GLB,, | Performed by: ORTHOPAEDIC SURGERY

## 2022-10-03 PROCEDURE — 99203 PR OFFICE/OUTPT VISIT, NEW, LEVL III, 30-44 MIN: ICD-10-PCS | Mod: 25,S$GLB,, | Performed by: ORTHOPAEDIC SURGERY

## 2022-10-03 PROCEDURE — 3044F HG A1C LEVEL LT 7.0%: CPT | Mod: CPTII,S$GLB,, | Performed by: ORTHOPAEDIC SURGERY

## 2022-10-03 PROCEDURE — 20550 TENDON SHEATH: ICD-10-PCS | Mod: RT,S$GLB,, | Performed by: ORTHOPAEDIC SURGERY

## 2022-10-03 PROCEDURE — 99999 PR PBB SHADOW E&M-EST. PATIENT-LVL III: CPT | Mod: PBBFAC,,, | Performed by: ORTHOPAEDIC SURGERY

## 2022-10-03 PROCEDURE — 20550 NJX 1 TENDON SHEATH/LIGAMENT: CPT | Mod: RT,S$GLB,, | Performed by: ORTHOPAEDIC SURGERY

## 2022-10-03 PROCEDURE — 3044F PR MOST RECENT HEMOGLOBIN A1C LEVEL <7.0%: ICD-10-PCS | Mod: CPTII,S$GLB,, | Performed by: ORTHOPAEDIC SURGERY

## 2022-10-03 RX ADMIN — TRIAMCINOLONE ACETONIDE 40 MG: 40 INJECTION, SUSPENSION INTRA-ARTICULAR; INTRAMUSCULAR at 04:10

## 2022-10-03 NOTE — PROGRESS NOTES
10/3/2022    Chief Complaint:  No chief complaint on file.      HPI:  Omid Falcon is a 56 y.o. male, who presents to clinic today has a history of pain numbness and tingling to his right hand.  He also states that he is having some catching in his thumb.  He states that the thumb catching has only been going on for a few weeks but the numbness has been there for many months.  States that he feels as if he may have carpal tunnel syndrome.  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       PSHX:  Past Surgical History:   Procedure Laterality Date    ABDOMINAL HERNIA REPAIR Bilateral 6/18/2018    Procedure: REPAIR-HERNIA-INCISIONAL-INITIAL with mesh;  Surgeon: NEEL Gould MD;  Location: [facility] OR;  Service: General;  Laterality: Bilateral;    BACK SURGERY      CHOLECYSTECTOMY N/A 6/18/2018    Procedure: CHOLECYSTECTOMY;  Surgeon: NEEL Gould MD;  Location: [facility] OR;  Service: General;  Laterality: N/A;    COLON SURGERY      EXPLORATORY LAPAROTOMY N/A 6/18/2018    Procedure: EXPLORATORY-LAPAROTOMY;  Surgeon: NEEL Gould MD;  Location: [facility] OR;  Service: General;  Laterality: N/A;    KNEE SURGERY      LIVER RESECTION N/A 6/18/2018    Procedure: RESECTION-HEPATIC;  Surgeon: NEEL Gould MD;  Location: [facility] OR;  Service: General;  Laterality: N/A;       FMHX:  No family history on file.    SOCHX:  Social History     Tobacco Use    Smoking status: Never    Smokeless tobacco: Former     Types: Chew    Tobacco comments:     chews once a month   Substance Use Topics    Alcohol use: Yes     Comment: occas       ALLERGIES:  Ceftriaxone, Dermabond [tissue glues], Epinephrine, and Adhesive    CURRENT MEDICATIONS:  Current Outpatient Medications on File Prior to Visit    Medication Sig Dispense Refill    acetaminophen (TYLENOL) 325 MG tablet Take 2 tablets (650 mg total) by mouth every 8 (eight) hours as needed for Pain.  0    b complex vitamins capsule Take 1 capsule by mouth once daily.      blood sugar diagnostic Strp 1 strip by Other route.      cholestyramine (QUESTRAN) 4 gram packet TAKE 1\2 PACKET BY MOUTH THREE TIMES DAILY WITH MEALS 60 packet 11    multivitamin capsule Take 1 capsule by mouth once daily.       No current facility-administered medications on file prior to visit.       REVIEW OF SYSTEMS:  Review of Systems   Constitutional:  Negative for diaphoresis and fever.   HENT:  Positive for hearing loss and tinnitus. Negative for ear pain and nosebleeds.    Eyes:  Negative for pain and redness.   Respiratory:  Negative for cough and shortness of breath.    Cardiovascular:  Negative for chest pain and palpitations.   Gastrointestinal:  Negative for blood in stool, constipation, diarrhea, nausea and vomiting.   Genitourinary:  Negative for frequency and hematuria.   Musculoskeletal:  Positive for back pain. Negative for myalgias.   Skin:  Negative for itching and rash.   Neurological:  Positive for tingling and weakness. Negative for dizziness, seizures and headaches.   Endo/Heme/Allergies:  Positive for environmental allergies.   Psychiatric/Behavioral:  The patient is not nervous/anxious.      GENERAL PHYSICAL EXAM:   There were no vitals taken for this visit.   GEN: well developed, well nourished, no acute distress   HENT: Normocephalic, atraumatic   EYES: No discharge, conjunctiva normal   NECK: Supple, non-tender   PULM: No wheezing, no respiratory distress   CV: RRR   ABD: Soft, non-tender    ORTHO EXAM:   Examination of the right hand and wrist reveals that there is no edema.  There are no major skin changes.  Palpation does produce tenderness over the A1 pulley of the thumb.  Flexion and extension of the thumb reveals painful active triggering.  He does report  grossly intact sensation throughout the median radial and ulnar distributions.  He has a positive Tinel's and a positive Durkan's test at the wrist.  Does have 2+ radial pulse.      RADIOLOGY:   None    ASSESSMENT:   Right thumb triggering, right carpal tunnel syndrome    PLAN:  1. After informed consent was obtained injection was placed the right thumb flexor tendon sheath.  The patient tolerated that well.    2. He will begin wearing carpal tunnel splints at night    3. Will follow up with me on a p.r.n. basis.    Answers submitted by the patient for this visit:  Orthopedics Questionnaire (Submitted on 9/30/2022)  unexpected weight change: No  appetite change : No  sleep disturbance: No  IMMUNOCOMPROMISED: No  dysphoric mood: No  visual disturbance: No  sinus pressure : No  food allergies: No  difficulty urinating: No  numbness: Yes  joint swelling: Yes   (Submitted on 9/30/2022)  Chief Complaint: Arm pain  Pain Chronicity: chronic  History of trauma: No  Onset: more than 1 year ago  Frequency: constantly  Progression since onset: rapidly worsening  injury location: at home  pain- numeric: 5/10  pain location: right shoulder, right hand  pain quality: aching  Radiating Pain: No  Aggravating factors: lying down  inability to bear weight: No  joint locking: Yes  limited range of motion: Yes  stiffness: Yes  Treatments tried: OTC pain meds  physical therapy: not tried  Improvement on treatment: mild

## 2022-10-09 RX ORDER — TRIAMCINOLONE ACETONIDE 40 MG/ML
40 INJECTION, SUSPENSION INTRA-ARTICULAR; INTRAMUSCULAR
Status: DISCONTINUED | OUTPATIENT
Start: 2022-10-03 | End: 2022-10-09 | Stop reason: HOSPADM

## 2022-10-09 NOTE — PROCEDURES
Tendon Sheath    Date/Time: 10/3/2022 4:00 PM  Performed by: Ricardo Duffy MD  Authorized by: Ricardo Duffy MD     Consent Done?:  Yes (Verbal)  Indications:  Pain  Site marked: the procedure site was marked    Timeout: prior to procedure the correct patient, procedure, and site was verified    Prep: patient was prepped and draped in usual sterile fashion      Local anesthetic:  Lidocaine 1% without epinephrine  Anesthetic total (ml):  0.5    Location:  Thumb  Site:  R thumb flexor tendon sheath  Needle size:  25 G  Medications:  40 mg triamcinolone acetonide 40 mg/mL (20mg injected)  Patient tolerance:  Patient tolerated the procedure well with no immediate complications

## 2023-03-29 ENCOUNTER — OFFICE VISIT (OUTPATIENT)
Dept: FAMILY MEDICINE | Facility: CLINIC | Age: 58
End: 2023-03-29
Payer: COMMERCIAL

## 2023-03-29 VITALS
SYSTOLIC BLOOD PRESSURE: 122 MMHG | DIASTOLIC BLOOD PRESSURE: 86 MMHG | WEIGHT: 241.63 LBS | HEART RATE: 63 BPM | BODY MASS INDEX: 34.67 KG/M2 | OXYGEN SATURATION: 98 %

## 2023-03-29 DIAGNOSIS — C7A.012 MALIGNANT CARCINOID TUMOR OF THE ILEUM: ICD-10-CM

## 2023-03-29 DIAGNOSIS — E66.9 CLASS 1 OBESITY WITH BODY MASS INDEX (BMI) OF 34.0 TO 34.9 IN ADULT, UNSPECIFIED OBESITY TYPE, UNSPECIFIED WHETHER SERIOUS COMORBIDITY PRESENT: ICD-10-CM

## 2023-03-29 DIAGNOSIS — R73.03 PREDIABETES: ICD-10-CM

## 2023-03-29 DIAGNOSIS — Z01.818 PRE-OP EVALUATION: Primary | ICD-10-CM

## 2023-03-29 PROCEDURE — 99214 PR OFFICE/OUTPT VISIT, EST, LEVL IV, 30-39 MIN: ICD-10-PCS | Mod: S$GLB,,, | Performed by: FAMILY MEDICINE

## 2023-03-29 PROCEDURE — 3079F PR MOST RECENT DIASTOLIC BLOOD PRESSURE 80-89 MM HG: ICD-10-PCS | Mod: CPTII,S$GLB,, | Performed by: FAMILY MEDICINE

## 2023-03-29 PROCEDURE — 1159F PR MEDICATION LIST DOCUMENTED IN MEDICAL RECORD: ICD-10-PCS | Mod: CPTII,S$GLB,, | Performed by: FAMILY MEDICINE

## 2023-03-29 PROCEDURE — 99999 PR PBB SHADOW E&M-EST. PATIENT-LVL III: CPT | Mod: PBBFAC,,, | Performed by: FAMILY MEDICINE

## 2023-03-29 PROCEDURE — 3008F PR BODY MASS INDEX (BMI) DOCUMENTED: ICD-10-PCS | Mod: CPTII,S$GLB,, | Performed by: FAMILY MEDICINE

## 2023-03-29 PROCEDURE — 99999 PR PBB SHADOW E&M-EST. PATIENT-LVL III: ICD-10-PCS | Mod: PBBFAC,,, | Performed by: FAMILY MEDICINE

## 2023-03-29 PROCEDURE — 1160F RVW MEDS BY RX/DR IN RCRD: CPT | Mod: CPTII,S$GLB,, | Performed by: FAMILY MEDICINE

## 2023-03-29 PROCEDURE — 3074F SYST BP LT 130 MM HG: CPT | Mod: CPTII,S$GLB,, | Performed by: FAMILY MEDICINE

## 2023-03-29 PROCEDURE — 3008F BODY MASS INDEX DOCD: CPT | Mod: CPTII,S$GLB,, | Performed by: FAMILY MEDICINE

## 2023-03-29 PROCEDURE — 3079F DIAST BP 80-89 MM HG: CPT | Mod: CPTII,S$GLB,, | Performed by: FAMILY MEDICINE

## 2023-03-29 PROCEDURE — 1159F MED LIST DOCD IN RCRD: CPT | Mod: CPTII,S$GLB,, | Performed by: FAMILY MEDICINE

## 2023-03-29 PROCEDURE — 1160F PR REVIEW ALL MEDS BY PRESCRIBER/CLIN PHARMACIST DOCUMENTED: ICD-10-PCS | Mod: CPTII,S$GLB,, | Performed by: FAMILY MEDICINE

## 2023-03-29 PROCEDURE — 99214 OFFICE O/P EST MOD 30 MIN: CPT | Mod: S$GLB,,, | Performed by: FAMILY MEDICINE

## 2023-03-29 PROCEDURE — 3074F PR MOST RECENT SYSTOLIC BLOOD PRESSURE < 130 MM HG: ICD-10-PCS | Mod: CPTII,S$GLB,, | Performed by: FAMILY MEDICINE

## 2023-03-29 NOTE — LETTER
[facility]  [address]  [facility] [address]  Phone: [phone]  Fax: [phone] March 29, 2023     Patient: Omid Falcon   YOB: 1965   Date of Visit: 3/29/2023       To Whom It May Concern:      I had the pleasure of seeing Mr. Omid Falcon in the office today for a pre-op evaluation.  As you know, he will be having a total right knee replacement.  His chronic medical conditions are stable at this time.  He is a good candidate for the surgery and I clear him from my perspective pending normal lab work.    If you have any questions or concerns, please don't hesitate to contact my office.    Sincerely,        Josh Lizama MD

## 2023-03-29 NOTE — PROGRESS NOTES
Subjective:       Patient ID: Omid Falcon is a 57 y.o. male.    Chief Complaint: Pre-op Exam    Here today for a pre-op evaluation.  He will be having a R knee replacement with Dr. Yanes on April 26th.   He is scheduled for lab work on 3/31    Review of Systems   Constitutional:  Negative for appetite change, fatigue and fever.   Respiratory:  Negative for cough, shortness of breath and wheezing.    Cardiovascular:  Negative for chest pain and palpitations.   Gastrointestinal:  Negative for abdominal pain, constipation, diarrhea, nausea and vomiting.   Genitourinary:  Negative for difficulty urinating, dysuria, frequency and hematuria.   Neurological:  Negative for dizziness, syncope, weakness and headaches.   Psychiatric/Behavioral:  Negative for agitation, behavioral problems and confusion. The patient is not nervous/anxious.      Objective:      Vitals:    03/29/23 1129   BP: 122/86   BP Location: Left arm   Patient Position: Sitting   Pulse: 63   SpO2: 98%   Weight: 109.6 kg (241 lb 10 oz)      Physical Exam  Constitutional:       General: He is not in acute distress.  Cardiovascular:      Rate and Rhythm: Normal rate and regular rhythm.      Heart sounds: Normal heart sounds. No murmur heard.  Pulmonary:      Effort: Pulmonary effort is normal. No respiratory distress.      Breath sounds: Normal breath sounds. No wheezing, rhonchi or rales.   Skin:     General: Skin is warm and dry.   Neurological:      General: No focal deficit present.      Mental Status: He is alert.   Psychiatric:         Mood and Affect: Mood normal.         Behavior: Behavior normal.         Thought Content: Thought content normal.       Results for orders placed or performed in visit on 09/09/22   SEROTONIN SERUM   Result Value Ref Range    Serotonin 136 <=230 ng/mL   Comprehensive Metabolic Panel   Result Value Ref Range    Sodium 140 136 - 145 mmol/L    Potassium 4.4 3.5 - 5.1 mmol/L    Chloride 108 95 - 110 mmol/L    CO2 22  (L) 23 - 29 mmol/L    Glucose 105 70 - 110 mg/dL    BUN 17 6 - 20 mg/dL    Creatinine 1.0 0.5 - 1.4 mg/dL    Calcium 9.5 8.7 - 10.5 mg/dL    Total Protein 7.8 6.0 - 8.4 g/dL    Albumin 4.4 3.5 - 5.2 g/dL    Total Bilirubin 0.6 0.1 - 1.0 mg/dL    Alkaline Phosphatase 49 (L) 55 - 135 U/L    AST 21 10 - 40 U/L    ALT 21 10 - 44 U/L    Anion Gap 10 8 - 16 mmol/L    eGFR >60.0 >60 mL/min/1.73 m^2   Hemoglobin A1C   Result Value Ref Range    Hemoglobin A1C 5.7 (H) 4.0 - 5.6 %    Estimated Avg Glucose 117 68 - 131 mg/dL   Lipid Panel   Result Value Ref Range    Cholesterol 184 120 - 199 mg/dL    Triglycerides 141 30 - 150 mg/dL    HDL 49 40 - 75 mg/dL    LDL Cholesterol 106.8 63.0 - 159.0 mg/dL    HDL/Cholesterol Ratio 26.6 20.0 - 50.0 %    Total Cholesterol/HDL Ratio 3.8 2.0 - 5.0    Non-HDL Cholesterol 135 mg/dL   PSA, Screening   Result Value Ref Range    PSA, Screen 0.76 0.00 - 4.00 ng/mL   TSH   Result Value Ref Range    TSH 0.763 0.400 - 4.000 uIU/mL   HIV 1/2 Ag/Ab (4th Gen)   Result Value Ref Range    HIV 1/2 Ag/Ab Non-reactive Non-reactive   Hepatitis C Antibody   Result Value Ref Range    Hepatitis C Ab Non-reactive Non-reactive      Assessment:       1. Pre-op evaluation    2. Prediabetes    3. Malignant carcinoid tumor of the ileum    4. Class 1 obesity with body mass index (BMI) of 34.0 to 34.9 in adult, unspecified obesity type, unspecified whether serious comorbidity present        Plan:       Pre-op evaluation    Prediabetes    Malignant carcinoid tumor of the ileum    Class 1 obesity with body mass index (BMI) of 34.0 to 34.9 in adult, unspecified obesity type, unspecified whether serious comorbidity present    Letter of clearance written today.      Medication List with Changes/Refills   Current Medications    ACETAMINOPHEN (TYLENOL) 325 MG TABLET    Take 2 tablets (650 mg total) by mouth every 8 (eight) hours as needed for Pain.    B COMPLEX VITAMINS CAPSULE    Take 1 capsule by mouth once daily.     BLOOD SUGAR DIAGNOSTIC STRP    1 strip by Other route.    CHOLESTYRAMINE (QUESTRAN) 4 GRAM PACKET    TAKE 1\2 PACKET BY MOUTH THREE TIMES DAILY WITH MEALS    MULTIVITAMIN CAPSULE    Take 1 capsule by mouth once daily.

## 2023-04-11 ENCOUNTER — PATIENT MESSAGE (OUTPATIENT)
Dept: ADMINISTRATIVE | Facility: HOSPITAL | Age: 58
End: 2023-04-11
Payer: COMMERCIAL

## 2023-04-24 ENCOUNTER — TELEPHONE (OUTPATIENT)
Dept: FAMILY MEDICINE | Facility: CLINIC | Age: 58
End: 2023-04-24
Payer: COMMERCIAL

## 2023-04-24 NOTE — TELEPHONE ENCOUNTER
----- Message from Chelsey Thompson sent at 4/24/2023  9:24 AM CDT -----  Regarding: please advise  Who Called:[facility]         What is the reqeust in detail: Requesting call back to discuss pt is having surgery Wednesday and requesting medical clearance they fax twice and received no call please call or fax clearance to office.         Can the clinic reply by [facility]?no         Best Call Back Number:[phone]         Additional Information: Fax at : [phone]

## 2023-04-28 ENCOUNTER — OFFICE VISIT (OUTPATIENT)
Dept: FAMILY MEDICINE | Facility: CLINIC | Age: 58
End: 2023-04-28
Payer: COMMERCIAL

## 2023-04-28 VITALS
HEART RATE: 71 BPM | WEIGHT: 243.38 LBS | DIASTOLIC BLOOD PRESSURE: 86 MMHG | BODY MASS INDEX: 34.84 KG/M2 | OXYGEN SATURATION: 96 % | SYSTOLIC BLOOD PRESSURE: 126 MMHG | HEIGHT: 70 IN | TEMPERATURE: 98 F

## 2023-04-28 DIAGNOSIS — J06.9 UPPER RESPIRATORY TRACT INFECTION, UNSPECIFIED TYPE: Primary | ICD-10-CM

## 2023-04-28 PROCEDURE — 3079F DIAST BP 80-89 MM HG: CPT | Mod: CPTII,S$GLB,, | Performed by: FAMILY MEDICINE

## 2023-04-28 PROCEDURE — 3079F PR MOST RECENT DIASTOLIC BLOOD PRESSURE 80-89 MM HG: ICD-10-PCS | Mod: CPTII,S$GLB,, | Performed by: FAMILY MEDICINE

## 2023-04-28 PROCEDURE — 3008F PR BODY MASS INDEX (BMI) DOCUMENTED: ICD-10-PCS | Mod: CPTII,S$GLB,, | Performed by: FAMILY MEDICINE

## 2023-04-28 PROCEDURE — 1159F PR MEDICATION LIST DOCUMENTED IN MEDICAL RECORD: ICD-10-PCS | Mod: CPTII,S$GLB,, | Performed by: FAMILY MEDICINE

## 2023-04-28 PROCEDURE — 3008F BODY MASS INDEX DOCD: CPT | Mod: CPTII,S$GLB,, | Performed by: FAMILY MEDICINE

## 2023-04-28 PROCEDURE — 3074F SYST BP LT 130 MM HG: CPT | Mod: CPTII,S$GLB,, | Performed by: FAMILY MEDICINE

## 2023-04-28 PROCEDURE — 99213 OFFICE O/P EST LOW 20 MIN: CPT | Mod: S$GLB,,, | Performed by: FAMILY MEDICINE

## 2023-04-28 PROCEDURE — 99999 PR PBB SHADOW E&M-EST. PATIENT-LVL III: ICD-10-PCS | Mod: PBBFAC,,, | Performed by: FAMILY MEDICINE

## 2023-04-28 PROCEDURE — 3074F PR MOST RECENT SYSTOLIC BLOOD PRESSURE < 130 MM HG: ICD-10-PCS | Mod: CPTII,S$GLB,, | Performed by: FAMILY MEDICINE

## 2023-04-28 PROCEDURE — 99999 PR PBB SHADOW E&M-EST. PATIENT-LVL III: CPT | Mod: PBBFAC,,, | Performed by: FAMILY MEDICINE

## 2023-04-28 PROCEDURE — 99213 PR OFFICE/OUTPT VISIT, EST, LEVL III, 20-29 MIN: ICD-10-PCS | Mod: S$GLB,,, | Performed by: FAMILY MEDICINE

## 2023-04-28 PROCEDURE — 1159F MED LIST DOCD IN RCRD: CPT | Mod: CPTII,S$GLB,, | Performed by: FAMILY MEDICINE

## 2023-04-28 RX ORDER — OXYCODONE HYDROCHLORIDE 5 MG/1
5 TABLET ORAL EVERY 4 HOURS PRN
Status: ON HOLD | COMMUNITY
Start: 2023-04-19 | End: 2023-05-17 | Stop reason: HOSPADM

## 2023-04-28 RX ORDER — TRAMADOL HYDROCHLORIDE 50 MG/1
50 TABLET ORAL EVERY 6 HOURS PRN
Status: ON HOLD | COMMUNITY
Start: 2023-04-19 | End: 2023-05-17 | Stop reason: HOSPADM

## 2023-04-28 RX ORDER — METHYLPREDNISOLONE 4 MG/1
TABLET ORAL
Qty: 21 EACH | Refills: 0 | Status: SHIPPED | OUTPATIENT
Start: 2023-04-28 | End: 2023-05-19

## 2023-04-28 RX ORDER — ONDANSETRON HYDROCHLORIDE 8 MG/1
8 TABLET, FILM COATED ORAL 2 TIMES DAILY PRN
COMMUNITY
Start: 2023-04-19 | End: 2023-05-29

## 2023-04-28 NOTE — PROGRESS NOTES
Subjective:       Patient ID: Omid Falcon is a 57 y.o. male.    Chief Complaint: Sinusitis    Here as new pt to me for acute visit.   Symptoms x 3 days and seems worse.      Sinusitis  Associated symptoms include congestion. Pertinent negatives include no chills, coughing or shortness of breath.   Review of Systems   Constitutional:  Negative for chills and fever.   HENT:  Positive for congestion.    Respiratory:  Negative for cough, chest tightness and shortness of breath.    Cardiovascular:  Negative for chest pain, palpitations and leg swelling.   Endocrine: Negative for cold intolerance and heat intolerance.   Psychiatric/Behavioral:  Negative for decreased concentration. The patient is not nervous/anxious.      Objective:      Physical Exam  Vitals and nursing note reviewed.   Constitutional:       Appearance: He is well-developed.   HENT:      Head: Normocephalic and atraumatic.   Cardiovascular:      Rate and Rhythm: Normal rate and regular rhythm.      Heart sounds: Normal heart sounds.   Pulmonary:      Effort: Pulmonary effort is normal.      Breath sounds: Normal breath sounds.       Assessment:       1. Upper respiratory tract infection, unspecified type        Plan:       Upper respiratory tract infection, unspecified type    Other orders  -     methylPREDNISolone (MEDROL DOSEPACK) 4 mg tablet; use as directed  Dispense: 21 each; Refill: 0        Otc decongestant  Message Monday if not improved/resolving  No ozempic due to MEN discussed  Follow up if symptoms worsen or fail to improve.

## 2023-05-05 ENCOUNTER — PATIENT MESSAGE (OUTPATIENT)
Dept: FAMILY MEDICINE | Facility: CLINIC | Age: 58
End: 2023-05-05
Payer: COMMERCIAL

## 2023-05-05 DIAGNOSIS — J01.90 ACUTE NON-RECURRENT SINUSITIS, UNSPECIFIED LOCATION: Primary | ICD-10-CM

## 2023-05-05 RX ORDER — DOXYCYCLINE HYCLATE 100 MG
100 TABLET ORAL EVERY 12 HOURS
Qty: 20 TABLET | Refills: 0 | Status: ON HOLD | OUTPATIENT
Start: 2023-05-05 | End: 2023-05-17 | Stop reason: HOSPADM

## 2023-05-16 PROBLEM — G89.18 POST-OP PAIN: Status: ACTIVE | Noted: 2023-05-16

## 2023-05-18 ENCOUNTER — PATIENT MESSAGE (OUTPATIENT)
Dept: FAMILY MEDICINE | Facility: CLINIC | Age: 58
End: 2023-05-18
Payer: COMMERCIAL

## 2023-05-23 ENCOUNTER — PATIENT MESSAGE (OUTPATIENT)
Dept: FAMILY MEDICINE | Facility: CLINIC | Age: 58
End: 2023-05-23
Payer: COMMERCIAL

## 2023-05-23 DIAGNOSIS — M10.9 GOUT, UNSPECIFIED CAUSE, UNSPECIFIED CHRONICITY, UNSPECIFIED SITE: Primary | ICD-10-CM

## 2023-05-23 RX ORDER — COLCHICINE 0.6 MG/1
TABLET ORAL
Qty: 30 TABLET | Refills: 1 | Status: SHIPPED | OUTPATIENT
Start: 2023-05-23 | End: 2023-10-11

## 2023-05-25 ENCOUNTER — PATIENT MESSAGE (OUTPATIENT)
Dept: FAMILY MEDICINE | Facility: CLINIC | Age: 58
End: 2023-05-25
Payer: COMMERCIAL

## 2023-05-26 ENCOUNTER — OFFICE VISIT (OUTPATIENT)
Dept: FAMILY MEDICINE | Facility: CLINIC | Age: 58
End: 2023-05-26
Payer: COMMERCIAL

## 2023-05-26 VITALS
WEIGHT: 238.31 LBS | OXYGEN SATURATION: 98 % | HEART RATE: 80 BPM | BODY MASS INDEX: 33.36 KG/M2 | SYSTOLIC BLOOD PRESSURE: 152 MMHG | DIASTOLIC BLOOD PRESSURE: 100 MMHG | HEIGHT: 71 IN

## 2023-05-26 DIAGNOSIS — Z23 NEED FOR TDAP VACCINATION: ICD-10-CM

## 2023-05-26 DIAGNOSIS — M10.9 GOUT, UNSPECIFIED CAUSE, UNSPECIFIED CHRONICITY, UNSPECIFIED SITE: Primary | ICD-10-CM

## 2023-05-26 DIAGNOSIS — R03.0 ELEVATED BP WITHOUT DIAGNOSIS OF HYPERTENSION: ICD-10-CM

## 2023-05-26 PROCEDURE — 99999 PR PBB SHADOW E&M-EST. PATIENT-LVL III: ICD-10-PCS | Mod: PBBFAC,,, | Performed by: FAMILY MEDICINE

## 2023-05-26 PROCEDURE — 3077F SYST BP >= 140 MM HG: CPT | Mod: CPTII,S$GLB,, | Performed by: FAMILY MEDICINE

## 2023-05-26 PROCEDURE — 90715 TDAP VACCINE 7 YRS/> IM: CPT | Mod: S$GLB,,, | Performed by: FAMILY MEDICINE

## 2023-05-26 PROCEDURE — 99214 OFFICE O/P EST MOD 30 MIN: CPT | Mod: 25,S$GLB,, | Performed by: FAMILY MEDICINE

## 2023-05-26 PROCEDURE — 3077F PR MOST RECENT SYSTOLIC BLOOD PRESSURE >= 140 MM HG: ICD-10-PCS | Mod: CPTII,S$GLB,, | Performed by: FAMILY MEDICINE

## 2023-05-26 PROCEDURE — 1160F PR REVIEW ALL MEDS BY PRESCRIBER/CLIN PHARMACIST DOCUMENTED: ICD-10-PCS | Mod: CPTII,S$GLB,, | Performed by: FAMILY MEDICINE

## 2023-05-26 PROCEDURE — 3008F BODY MASS INDEX DOCD: CPT | Mod: CPTII,S$GLB,, | Performed by: FAMILY MEDICINE

## 2023-05-26 PROCEDURE — 90471 TDAP VACCINE GREATER THAN OR EQUAL TO 7YO IM: ICD-10-PCS | Mod: S$GLB,,, | Performed by: FAMILY MEDICINE

## 2023-05-26 PROCEDURE — 1160F RVW MEDS BY RX/DR IN RCRD: CPT | Mod: CPTII,S$GLB,, | Performed by: FAMILY MEDICINE

## 2023-05-26 PROCEDURE — 1159F MED LIST DOCD IN RCRD: CPT | Mod: CPTII,S$GLB,, | Performed by: FAMILY MEDICINE

## 2023-05-26 PROCEDURE — 3008F PR BODY MASS INDEX (BMI) DOCUMENTED: ICD-10-PCS | Mod: CPTII,S$GLB,, | Performed by: FAMILY MEDICINE

## 2023-05-26 PROCEDURE — 90715 TDAP VACCINE GREATER THAN OR EQUAL TO 7YO IM: ICD-10-PCS | Mod: S$GLB,,, | Performed by: FAMILY MEDICINE

## 2023-05-26 PROCEDURE — 3080F DIAST BP >= 90 MM HG: CPT | Mod: CPTII,S$GLB,, | Performed by: FAMILY MEDICINE

## 2023-05-26 PROCEDURE — 90471 IMMUNIZATION ADMIN: CPT | Mod: S$GLB,,, | Performed by: FAMILY MEDICINE

## 2023-05-26 PROCEDURE — 3080F PR MOST RECENT DIASTOLIC BLOOD PRESSURE >= 90 MM HG: ICD-10-PCS | Mod: CPTII,S$GLB,, | Performed by: FAMILY MEDICINE

## 2023-05-26 PROCEDURE — 99999 PR PBB SHADOW E&M-EST. PATIENT-LVL III: CPT | Mod: PBBFAC,,, | Performed by: FAMILY MEDICINE

## 2023-05-26 PROCEDURE — 1159F PR MEDICATION LIST DOCUMENTED IN MEDICAL RECORD: ICD-10-PCS | Mod: CPTII,S$GLB,, | Performed by: FAMILY MEDICINE

## 2023-05-26 PROCEDURE — 99214 PR OFFICE/OUTPT VISIT, EST, LEVL IV, 30-39 MIN: ICD-10-PCS | Mod: 25,S$GLB,, | Performed by: FAMILY MEDICINE

## 2023-05-26 RX ORDER — CLONIDINE HYDROCHLORIDE 0.1 MG/1
0.1 TABLET ORAL 2 TIMES DAILY PRN
Qty: 60 TABLET | Refills: 0 | Status: SHIPPED | OUTPATIENT
Start: 2023-05-26 | End: 2023-07-11

## 2023-05-26 NOTE — PROGRESS NOTES
"Subjective:       Patient ID: Omid Falcon is a 57 y.o. male.    Chief Complaint: Hypertension and Toe Pain (Redness on big toe )     Here today for an acute visit.  He is here today c/o pain, swelling and redness to his r great toe x 3 days.  He was started on Colcrys.  Very little improvement initally, but today has had improvement.  Also, his blood pressure has been elevated since his surgery.    Hypertension  Pertinent negatives include no chest pain, headaches, palpitations or shortness of breath.   Toe Pain     Review of Systems   Constitutional:  Negative for appetite change, fatigue and fever.   Respiratory:  Negative for cough, shortness of breath and wheezing.    Cardiovascular:  Negative for chest pain and palpitations.   Gastrointestinal:  Negative for abdominal pain, constipation, diarrhea, nausea and vomiting.   Genitourinary:  Negative for difficulty urinating, dysuria, frequency and hematuria.   Neurological:  Negative for dizziness, syncope, weakness and headaches.   Psychiatric/Behavioral:  Negative for agitation, behavioral problems and confusion.      Objective:      Vitals:    05/26/23 1527   BP: (!) 152/100   Pulse: 80   SpO2: 98%   Weight: 108.1 kg (238 lb 5.1 oz)   Height: 5' 10.5" (1.791 m)      Physical Exam  Constitutional:       General: He is not in acute distress.  Cardiovascular:      Rate and Rhythm: Normal rate and regular rhythm.      Heart sounds: Normal heart sounds. No murmur heard.  Pulmonary:      Effort: Pulmonary effort is normal. No respiratory distress.      Breath sounds: Normal breath sounds. No wheezing, rhonchi or rales.   Musculoskeletal:        Legs:       Comments: R great Toe:  TTP, mild erythema, mild edema   Neurological:      General: No focal deficit present.      Mental Status: He is alert.   Psychiatric:         Mood and Affect: Mood normal.         Behavior: Behavior normal.         Thought Content: Thought content normal.       Results for orders placed " or performed during the hospital encounter of 05/16/23   CBC auto differential   Result Value Ref Range    WBC 8.98 3.90 - 12.70 K/uL    RBC 4.85 4.60 - 6.20 M/uL    Hemoglobin 14.4 14.0 - 18.0 g/dL    Hematocrit 44.7 40.0 - 54.0 %    MCV 92 82 - 98 fL    MCH 29.7 27.0 - 31.0 pg    MCHC 32.2 32.0 - 36.0 g/dL    RDW 13.3 11.5 - 14.5 %    Platelets 187 150 - 450 K/uL    MPV 9.6 9.2 - 12.9 fL    Immature Granulocytes 0.3 0.0 - 0.5 %    Gran # (ANC) 7.2 1.8 - 7.7 K/uL    Immature Grans (Abs) 0.03 0.00 - 0.04 K/uL    Lymph # 1.1 1.0 - 4.8 K/uL    Mono # 0.6 0.3 - 1.0 K/uL    Eos # 0.0 0.0 - 0.5 K/uL    Baso # 0.03 0.00 - 0.20 K/uL    nRBC 0 0 /100 WBC    Gran % 80.2 (H) 38.0 - 73.0 %    Lymph % 12.0 (L) 18.0 - 48.0 %    Mono % 7.1 4.0 - 15.0 %    Eosinophil % 0.1 0.0 - 8.0 %    Basophil % 0.3 0.0 - 1.9 %    Differential Method Automated    Comprehensive metabolic panel   Result Value Ref Range    Sodium 137 136 - 145 mmol/L    Potassium 4.5 3.5 - 5.1 mmol/L    Chloride 103 95 - 110 mmol/L    CO2 30 22 - 31 mmol/L    Glucose 120 (H) 70 - 110 mg/dL    BUN 14 9 - 21 mg/dL    Creatinine 1.05 0.50 - 1.40 mg/dL    Calcium 8.4 8.4 - 10.2 mg/dL    Total Protein 6.7 6.0 - 8.4 g/dL    Albumin 3.8 3.5 - 5.2 g/dL    Total Bilirubin 0.8 0.2 - 1.3 mg/dL    Alkaline Phosphatase 48 38 - 145 U/L    AST 38 17 - 59 U/L    ALT 38 0 - 50 U/L    Anion Gap 4 mmol/L    eGFR >60 >60 mL/min/1.73 m^2   Magnesium   Result Value Ref Range    Magnesium 2.0 1.6 - 2.6 mg/dL      Assessment:       1. Gout, unspecified cause, unspecified chronicity, unspecified site    2. Elevated BP without diagnosis of hypertension    3. Need for Tdap vaccination        Plan:       Gout, unspecified cause, unspecified chronicity, unspecified site  Improving at this time.  He has Colcrys to take tomorrow (3 day window) in case his symptoms persist.  Elevated BP without diagnosis of hypertension  -     cloNIDine (CATAPRES) 0.1 MG tablet; Take 1 tablet (0.1 mg total) by  mouth 2 (two) times daily as needed (BP elevated > 170/110).  Dispense: 60 tablet; Refill: 0  Discussed how we dx HTN and how his elevated BP is new since surgery.  Possible Anes affect?  Will give him Clonidine to use for markedly elevated BP, but will otherwise monitor at this time.  Need for Tdap vaccination  -     Tdap Vaccine          Medication List with Changes/Refills   New Medications    CLONIDINE (CATAPRES) 0.1 MG TABLET    Take 1 tablet (0.1 mg total) by mouth 2 (two) times daily as needed (BP elevated > 170/110).   Current Medications    ACETAMINOPHEN (TYLENOL) 325 MG TABLET    Take 2 tablets (650 mg total) by mouth every 8 (eight) hours as needed for Pain.    ANASTROZOLE (ARIMIDEX) 1 MG TAB    Take 1 mg by mouth once a week.    B COMPLEX VITAMINS CAPSULE    Take 1 capsule by mouth once daily.    BLOOD SUGAR DIAGNOSTIC STRP    1 strip by Other route.    CHOLESTYRAMINE (QUESTRAN) 4 GRAM PACKET    TAKE 1\2 PACKET BY MOUTH THREE TIMES DAILY WITH MEALS    COLCHICINE (COLCRYS) 0.6 MG TABLET    1.2 mg PO x 1, then 0.6 mg PO 1 hour later x 1 (do not repeat again for 3 days)    INV TESTOSTERONE/ANASTRAZOLE 60MG/4MG PELLET OR PLACEBO    Inject 2 Pellet into the skin. FOR INVESTIGATIONAL USE ONLY    MULTIVITAMIN CAPSULE    Take 1 capsule by mouth once daily.    TRAMADOL (ULTRAM) 50 MG TABLET    Take 1 tablet (50 mg total) by mouth every 4 (four) hours as needed for Pain.   Discontinued Medications    ONDANSETRON (ZOFRAN) 8 MG TABLET    Take 8 mg by mouth 2 (two) times daily as needed.    OXYCODONE (ROXICODONE) 5 MG IMMEDIATE RELEASE TABLET    Take 1 tablet (5 mg total) by mouth every 4 (four) hours as needed for Pain.

## 2023-06-09 ENCOUNTER — PATIENT MESSAGE (OUTPATIENT)
Dept: FAMILY MEDICINE | Facility: CLINIC | Age: 58
End: 2023-06-09
Payer: COMMERCIAL

## 2023-06-09 DIAGNOSIS — M10.9 GOUT, UNSPECIFIED CAUSE, UNSPECIFIED CHRONICITY, UNSPECIFIED SITE: Primary | ICD-10-CM

## 2023-06-09 RX ORDER — ALLOPURINOL 300 MG/1
300 TABLET ORAL DAILY
Qty: 30 TABLET | Refills: 3 | Status: SHIPPED | OUTPATIENT
Start: 2023-06-09 | End: 2023-09-08 | Stop reason: SDUPTHER

## 2023-06-16 ENCOUNTER — PATIENT MESSAGE (OUTPATIENT)
Dept: FAMILY MEDICINE | Facility: CLINIC | Age: 58
End: 2023-06-16
Payer: COMMERCIAL

## 2023-06-23 ENCOUNTER — OFFICE VISIT (OUTPATIENT)
Dept: OPTOMETRY | Facility: CLINIC | Age: 58
End: 2023-06-23
Payer: COMMERCIAL

## 2023-06-23 DIAGNOSIS — H52.4 ASTIGMATISM OF BOTH EYES WITH PRESBYOPIA: Primary | ICD-10-CM

## 2023-06-23 DIAGNOSIS — H52.203 ASTIGMATISM OF BOTH EYES WITH PRESBYOPIA: Primary | ICD-10-CM

## 2023-06-23 PROCEDURE — 92004 COMPRE OPH EXAM NEW PT 1/>: CPT | Mod: S$GLB,,, | Performed by: OPTOMETRIST

## 2023-06-23 PROCEDURE — 92015 PR REFRACTION: ICD-10-PCS | Mod: S$GLB,,, | Performed by: OPTOMETRIST

## 2023-06-23 PROCEDURE — 99999 PR PBB SHADOW E&M-EST. PATIENT-LVL III: CPT | Mod: PBBFAC,,, | Performed by: OPTOMETRIST

## 2023-06-23 PROCEDURE — 1159F MED LIST DOCD IN RCRD: CPT | Mod: CPTII,S$GLB,, | Performed by: OPTOMETRIST

## 2023-06-23 PROCEDURE — 1160F RVW MEDS BY RX/DR IN RCRD: CPT | Mod: CPTII,S$GLB,, | Performed by: OPTOMETRIST

## 2023-06-23 PROCEDURE — 92015 DETERMINE REFRACTIVE STATE: CPT | Mod: S$GLB,,, | Performed by: OPTOMETRIST

## 2023-06-23 PROCEDURE — 1159F PR MEDICATION LIST DOCUMENTED IN MEDICAL RECORD: ICD-10-PCS | Mod: CPTII,S$GLB,, | Performed by: OPTOMETRIST

## 2023-06-23 PROCEDURE — 92004 PR EYE EXAM, NEW PATIENT,COMPREHESV: ICD-10-PCS | Mod: S$GLB,,, | Performed by: OPTOMETRIST

## 2023-06-23 PROCEDURE — 99999 PR PBB SHADOW E&M-EST. PATIENT-LVL III: ICD-10-PCS | Mod: PBBFAC,,, | Performed by: OPTOMETRIST

## 2023-06-23 PROCEDURE — 1160F PR REVIEW ALL MEDS BY PRESCRIBER/CLIN PHARMACIST DOCUMENTED: ICD-10-PCS | Mod: CPTII,S$GLB,, | Performed by: OPTOMETRIST

## 2023-06-23 NOTE — PROGRESS NOTES
HPI    New pt here for annual eye exam dls- many years ago     Pt states his vision has been changing, has been using OTC +1.50 readers.   Pt is having blurry va at distance now as well. Denies DM and HTN.   Blood pressure has been high due to a recent knee surgery. Denies F/F and   GTTS.   Last edited by Karen Moran on 6/23/2023  1:50 PM.            Assessment /Plan     For exam results, see Encounter Report.    Astigmatism of both eyes with presbyopia      Optional spec rx for distance given, cont with nvo's for near

## 2023-06-27 ENCOUNTER — TELEPHONE (OUTPATIENT)
Dept: FAMILY MEDICINE | Facility: CLINIC | Age: 58
End: 2023-06-27
Payer: COMMERCIAL

## 2023-06-27 NOTE — TELEPHONE ENCOUNTER
----- Message from Td Ramirez sent at 6/27/2023  3:01 PM CDT -----  Contact: [facility]  Type: Needs Medical Advice  Who Called: Patient   Best Call Back Number: [phone] (Mr. Espitia)  Additional Information: Office states Dvt care past and future needs to speak with a nurse wanted to see if Dr. Lizama will take over the pt care. Thanks

## 2023-07-11 ENCOUNTER — OFFICE VISIT (OUTPATIENT)
Dept: FAMILY MEDICINE | Facility: CLINIC | Age: 58
End: 2023-07-11
Payer: COMMERCIAL

## 2023-07-11 ENCOUNTER — LAB VISIT (OUTPATIENT)
Dept: LAB | Facility: HOSPITAL | Age: 58
End: 2023-07-11
Attending: FAMILY MEDICINE
Payer: COMMERCIAL

## 2023-07-11 ENCOUNTER — PATIENT OUTREACH (OUTPATIENT)
Dept: ADMINISTRATIVE | Facility: HOSPITAL | Age: 58
End: 2023-07-11
Payer: COMMERCIAL

## 2023-07-11 VITALS
DIASTOLIC BLOOD PRESSURE: 98 MMHG | BODY MASS INDEX: 34.24 KG/M2 | HEART RATE: 81 BPM | HEIGHT: 70 IN | SYSTOLIC BLOOD PRESSURE: 152 MMHG | WEIGHT: 239.19 LBS | OXYGEN SATURATION: 97 %

## 2023-07-11 DIAGNOSIS — R73.03 PREDIABETES: ICD-10-CM

## 2023-07-11 DIAGNOSIS — M10.9 GOUT, UNSPECIFIED CAUSE, UNSPECIFIED CHRONICITY, UNSPECIFIED SITE: ICD-10-CM

## 2023-07-11 DIAGNOSIS — I10 BENIGN ESSENTIAL HTN: Primary | ICD-10-CM

## 2023-07-11 DIAGNOSIS — H61.20 IMPACTED CERUMEN, UNSPECIFIED LATERALITY: ICD-10-CM

## 2023-07-11 LAB
ESTIMATED AVG GLUCOSE: 120 MG/DL (ref 68–131)
HBA1C MFR BLD: 5.8 % (ref 4–5.6)

## 2023-07-11 PROCEDURE — 1160F RVW MEDS BY RX/DR IN RCRD: CPT | Mod: CPTII,S$GLB,, | Performed by: FAMILY MEDICINE

## 2023-07-11 PROCEDURE — 4010F PR ACE/ARB THEARPY RXD/TAKEN: ICD-10-PCS | Mod: CPTII,S$GLB,, | Performed by: FAMILY MEDICINE

## 2023-07-11 PROCEDURE — 3080F PR MOST RECENT DIASTOLIC BLOOD PRESSURE >= 90 MM HG: ICD-10-PCS | Mod: CPTII,S$GLB,, | Performed by: FAMILY MEDICINE

## 2023-07-11 PROCEDURE — 1160F PR REVIEW ALL MEDS BY PRESCRIBER/CLIN PHARMACIST DOCUMENTED: ICD-10-PCS | Mod: CPTII,S$GLB,, | Performed by: FAMILY MEDICINE

## 2023-07-11 PROCEDURE — 1159F MED LIST DOCD IN RCRD: CPT | Mod: CPTII,S$GLB,, | Performed by: FAMILY MEDICINE

## 2023-07-11 PROCEDURE — 3080F DIAST BP >= 90 MM HG: CPT | Mod: CPTII,S$GLB,, | Performed by: FAMILY MEDICINE

## 2023-07-11 PROCEDURE — 3008F PR BODY MASS INDEX (BMI) DOCUMENTED: ICD-10-PCS | Mod: CPTII,S$GLB,, | Performed by: FAMILY MEDICINE

## 2023-07-11 PROCEDURE — 3077F PR MOST RECENT SYSTOLIC BLOOD PRESSURE >= 140 MM HG: ICD-10-PCS | Mod: CPTII,S$GLB,, | Performed by: FAMILY MEDICINE

## 2023-07-11 PROCEDURE — 99214 OFFICE O/P EST MOD 30 MIN: CPT | Mod: S$GLB,,, | Performed by: FAMILY MEDICINE

## 2023-07-11 PROCEDURE — 99999 PR PBB SHADOW E&M-EST. PATIENT-LVL IV: CPT | Mod: PBBFAC,,, | Performed by: FAMILY MEDICINE

## 2023-07-11 PROCEDURE — 99214 PR OFFICE/OUTPT VISIT, EST, LEVL IV, 30-39 MIN: ICD-10-PCS | Mod: S$GLB,,, | Performed by: FAMILY MEDICINE

## 2023-07-11 PROCEDURE — 83036 HEMOGLOBIN GLYCOSYLATED A1C: CPT | Performed by: FAMILY MEDICINE

## 2023-07-11 PROCEDURE — 36415 COLL VENOUS BLD VENIPUNCTURE: CPT | Mod: PO | Performed by: FAMILY MEDICINE

## 2023-07-11 PROCEDURE — 3077F SYST BP >= 140 MM HG: CPT | Mod: CPTII,S$GLB,, | Performed by: FAMILY MEDICINE

## 2023-07-11 PROCEDURE — 3008F BODY MASS INDEX DOCD: CPT | Mod: CPTII,S$GLB,, | Performed by: FAMILY MEDICINE

## 2023-07-11 PROCEDURE — 1159F PR MEDICATION LIST DOCUMENTED IN MEDICAL RECORD: ICD-10-PCS | Mod: CPTII,S$GLB,, | Performed by: FAMILY MEDICINE

## 2023-07-11 PROCEDURE — 4010F ACE/ARB THERAPY RXD/TAKEN: CPT | Mod: CPTII,S$GLB,, | Performed by: FAMILY MEDICINE

## 2023-07-11 PROCEDURE — 99999 PR PBB SHADOW E&M-EST. PATIENT-LVL IV: ICD-10-PCS | Mod: PBBFAC,,, | Performed by: FAMILY MEDICINE

## 2023-07-11 RX ORDER — LISINOPRIL 10 MG/1
10 TABLET ORAL DAILY
Qty: 30 TABLET | Refills: 3 | Status: SHIPPED | OUTPATIENT
Start: 2023-07-11 | End: 2023-09-08 | Stop reason: SDUPTHER

## 2023-07-11 RX ORDER — INDOMETHACIN 50 MG/1
50 CAPSULE ORAL 2 TIMES DAILY
COMMUNITY
Start: 2023-06-27 | End: 2024-03-04

## 2023-07-11 RX ORDER — ASPIRIN 81 MG/1
81 TABLET ORAL DAILY
COMMUNITY
End: 2024-03-04

## 2023-07-11 NOTE — PROGRESS NOTES
Population Health Chart Review & Patient Outreach Details:     Reason for Outreach Encounter:     [x]  Non-Compliant Report   []  Payor Report ([employer], PHN, BCBS, MSSP, MCIP, UHC, etc.)   []  Pre-Visit Chart Review     Updates Requested / Reviewed:     []  Care Everywhere    []     []  External Sources ([facility], [facility], DIS, etc.)   [x]  Care Team Updated    Patient Outreach Method:    []  Telephone Outreach Completed   [] Successful   [] Left Voicemail   [] Unable to Contact (wrong number, no voicemail)  []  [employer] [facility] Outreach Sent  []  Letter Outreach Mailed  []  Fax Sent for External Records  [x]  External Records Upload    Health Maintenance Topics Addressed and Outreach Outcomes / Actions Taken:        []      Breast Cancer Screening []  Mammo Scheduled      []  External Records Requested     []  Added Reminder to Complete to Upcoming Primary Care Appt Notes     []  Patient Declined     []  Patient Will Call Back to Schedule     []  Patient Will Schedule with External Provider / Order Routed if Applicable             []       Cervical Cancer Screening []  Pap Scheduled      []  External Records Requested     []  Added Reminder to Complete to Upcoming Primary Care Appt Notes     []  Patient Declined     []  Patient Will Call Back to Schedule     []  Patient Will Schedule with External Provider               [x]          Colorectal Cancer Screening []  Colonoscopy Case Request or Referral Placed     []  External Records Requested     []  Added Reminder to Complete to Upcoming Primary Care Appt Notes     []  Patient Declined     []  Patient Will Call Back to Schedule     []  Patient Will Schedule with External Provider     []  Fit Kit Mailed (add the SmartPhrase under additional notes)     []  Reminded Patient to Complete Home Test             []      Diabetic Eye Exam []  Eye Camera Scheduled or Optometry Referral Placed     []  External Records Requested     []  Added Reminder to Complete to  Upcoming Primary Care Appt Notes     []  Patient Declined     []  Patient Will Call Back to Schedule     []  Patient Will Schedule with External Provider             []      Blood Pressure Control []  Primary Care Follow Up Visit Scheduled     []  Remote Blood Pressure Reading Captured     []  Added Reminder to Complete to Upcoming Primary Care Appt Notes     []  Patient Declined     []  Patient Will Call Back / Patient Will Send Portal Message with Reading     []  Patient Will Call Back to Schedule Provider Visit             []       HbA1c & Other Labs []  Lab Appt Scheduled for Due Labs     []  Primary Care Follow Up Visit Scheduled      []  Reminded Patient to Complete Home Test     []  Added Reminder to Complete to Upcoming Primary Care Appt Notes     []  Patient Declined     []  Patient Will Call Back to Schedule     []  Patient Will Schedule with External Provider / Order Routed if Applicable           []    Schedule Primary Care Appt []  Primary Care Appt Scheduled     []  Patient Declined     []  Patient Will Call Back to Schedule     []  Pt Established with External Provider & Updated Care Team             []      Medication Adherence []  Primary Care Appointment Scheduled     []  Added Reminder to Upcoming Primary Care Appt Notes     []  Patient Reminded to  Prescription     []  Patient Declined, Provider Notified if Needed     []  Sent Provider Message to Review and/or Add Exclusion to Problem List             []      Osteoporosis Screening []  DXA Appointment Scheduled     []  External Records Requested     []  Added Reminder to Complete to Upcoming Primary Care Appt Notes     []  Patient Declined     []  Patient Will Call Back to Schedule     []  Patient Will Schedule with External Provider / Order Routed if Applicable     Additional Care Coordinator Notes:     Uploaded 2017 colonoscopy from dr stout     Further Action Needed If Patient Returns Outreach:

## 2023-07-11 NOTE — PROGRESS NOTES
Subjective:       Patient ID: Omid Falcon is a 57 y.o. male.    Chief Complaint: Follow-up (F/u from knee surgery ), Hypertension, and Ear Fullness    Here today to follow up on chronic medical conditions.  Recent knee replacement.  Has developed DVT per US 6/2 in RLE.  Started on Eliquis.  Saw Ortho and had repeat US 6/27 which was normal.  Eliquis was stopped and he is on ASA.    HTN: BP has not come down since his surgery.  Has not needed to take the clonidine as he is averaging 150s/80s    Neuroendocrine Tumor : of bowel.  Seeing Heme/Onc.  He has had 2 different surgeries.  Was on medication, currently off.  Getting care at [facility].     Hyperglycemia:  elevated in past due to medication.  His last HgA1c was 5.7 in Sept.  Sugar is running in the 140-160s.      Gout:  symptoms resolved.  Now on daily Allopurinol.    Follow-up  Pertinent negatives include no abdominal pain, chest pain, coughing, fatigue, fever, headaches, nausea, vomiting or weakness.   Hypertension  Pertinent negatives include no chest pain, headaches, palpitations or shortness of breath.   Ear Fullness   Pertinent negatives include no abdominal pain, coughing, diarrhea, headaches or vomiting.   Review of Systems   Constitutional:  Negative for appetite change, fatigue and fever.   HENT:  Positive for ear pain (fullness and decreased hearing on R).    Respiratory:  Negative for cough, shortness of breath and wheezing.    Cardiovascular:  Negative for chest pain and palpitations.   Gastrointestinal:  Negative for abdominal pain, constipation, diarrhea, nausea and vomiting.   Genitourinary:  Negative for difficulty urinating, dysuria, frequency and hematuria.   Neurological:  Negative for dizziness, syncope, weakness and headaches.   Psychiatric/Behavioral:  Negative for agitation, behavioral problems and confusion.      Objective:      Vitals:    07/11/23 0851   BP: (!) 152/98   Pulse: 81   SpO2: 97%   Weight: 108.5 kg (239 lb 3.2 oz)   Height:  "5' 10" (1.778 m)      Physical Exam  Constitutional:       General: He is not in acute distress.  HENT:      Right Ear: There is impacted cerumen.      Left Ear: Hearing, tympanic membrane and ear canal normal.   Cardiovascular:      Rate and Rhythm: Normal rate and regular rhythm.      Heart sounds: Normal heart sounds. No murmur heard.  Pulmonary:      Effort: Pulmonary effort is normal. No respiratory distress.      Breath sounds: Normal breath sounds. No wheezing, rhonchi or rales.   Skin:     General: Skin is warm and dry.   Neurological:      General: No focal deficit present.      Mental Status: He is alert.   Psychiatric:         Mood and Affect: Mood normal.         Behavior: Behavior normal.         Thought Content: Thought content normal.       Results for orders placed or performed during the hospital encounter of 05/16/23   CBC auto differential   Result Value Ref Range    WBC 8.98 3.90 - 12.70 K/uL    RBC 4.85 4.60 - 6.20 M/uL    Hemoglobin 14.4 14.0 - 18.0 g/dL    Hematocrit 44.7 40.0 - 54.0 %    MCV 92 82 - 98 fL    MCH 29.7 27.0 - 31.0 pg    MCHC 32.2 32.0 - 36.0 g/dL    RDW 13.3 11.5 - 14.5 %    Platelets 187 150 - 450 K/uL    MPV 9.6 9.2 - 12.9 fL    Immature Granulocytes 0.3 0.0 - 0.5 %    Gran # (ANC) 7.2 1.8 - 7.7 K/uL    Immature Grans (Abs) 0.03 0.00 - 0.04 K/uL    Lymph # 1.1 1.0 - 4.8 K/uL    Mono # 0.6 0.3 - 1.0 K/uL    Eos # 0.0 0.0 - 0.5 K/uL    Baso # 0.03 0.00 - 0.20 K/uL    nRBC 0 0 /100 WBC    Gran % 80.2 (H) 38.0 - 73.0 %    Lymph % 12.0 (L) 18.0 - 48.0 %    Mono % 7.1 4.0 - 15.0 %    Eosinophil % 0.1 0.0 - 8.0 %    Basophil % 0.3 0.0 - 1.9 %    Differential Method Automated    Comprehensive metabolic panel   Result Value Ref Range    Sodium 137 136 - 145 mmol/L    Potassium 4.5 3.5 - 5.1 mmol/L    Chloride 103 95 - 110 mmol/L    CO2 30 22 - 31 mmol/L    Glucose 120 (H) 70 - 110 mg/dL    BUN 14 9 - 21 mg/dL    Creatinine 1.05 0.50 - 1.40 mg/dL    Calcium 8.4 8.4 - 10.2 mg/dL    " Total Protein 6.7 6.0 - 8.4 g/dL    Albumin 3.8 3.5 - 5.2 g/dL    Total Bilirubin 0.8 0.2 - 1.3 mg/dL    Alkaline Phosphatase 48 38 - 145 U/L    AST 38 17 - 59 U/L    ALT 38 0 - 50 U/L    Anion Gap 4 mmol/L    eGFR >60 >60 mL/min/1.73 m^2   Magnesium   Result Value Ref Range    Magnesium 2.0 1.6 - 2.6 mg/dL      Assessment:       1. Benign essential HTN    2. Gout, unspecified cause, unspecified chronicity, unspecified site    3. Prediabetes    4. Impacted cerumen, unspecified laterality        Plan:       Benign essential HTN  -     lisinopriL 10 MG tablet; Take 1 tablet (10 mg total) by mouth once daily.  Dispense: 30 tablet; Refill: 3  Start Lisinopril at this time.  Gout, unspecified cause, unspecified chronicity, unspecified site  Continue Allopurinol.  Use Colcrys as needed  Prediabetes  -     Hemoglobin A1C; Future; Expected date: 07/11/2023  Check HgA1c today.  If diabetic, would not be a candidate for GLP due to history of Neuroendocrine tumor.  Impacted cerumen, unspecified laterality  -     Ear wax removal  Ear wax removed in clinic today by flush by nurse.        Medication List with Changes/Refills   New Medications    LISINOPRIL 10 MG TABLET    Take 1 tablet (10 mg total) by mouth once daily.   Current Medications    ACETAMINOPHEN (TYLENOL) 325 MG TABLET    Take 2 tablets (650 mg total) by mouth every 8 (eight) hours as needed for Pain.    ALLOPURINOL (ZYLOPRIM) 300 MG TABLET    Take 1 tablet (300 mg total) by mouth once daily.    ANASTROZOLE (ARIMIDEX) 1 MG TAB    Take 1 mg by mouth once a week.    ASPIRIN (ECOTRIN) 81 MG EC TABLET    Take 81 mg by mouth once daily.    B COMPLEX VITAMINS CAPSULE    Take 1 capsule by mouth once daily.    BLOOD SUGAR DIAGNOSTIC STRP    1 strip by Other route.    CHOLESTYRAMINE (QUESTRAN) 4 GRAM PACKET    TAKE 1\2 PACKET BY MOUTH THREE TIMES DAILY WITH MEALS    COLCHICINE (COLCRYS) 0.6 MG TABLET    1.2 mg PO x 1, then 0.6 mg PO 1 hour later x 1 (do not repeat again for 3  days)    INDOMETHACIN (INDOCIN) 50 MG CAPSULE    Take 50 mg by mouth 2 (two) times daily.    INV TESTOSTERONE/ANASTRAZOLE 60MG/4MG PELLET OR PLACEBO    Inject 2 Pellet into the skin. FOR INVESTIGATIONAL USE ONLY    MULTIVITAMIN CAPSULE    Take 1 capsule by mouth once daily.   Discontinued Medications    APIXABAN (ELIQUIS) 2.5 MG TAB    Take 4 tablets (10 mg total) by mouth 2 (two) times daily.    CLONIDINE (CATAPRES) 0.1 MG TABLET    Take 1 tablet (0.1 mg total) by mouth 2 (two) times daily as needed (BP elevated > 170/110).    TRAMADOL (ULTRAM) 50 MG TABLET    Take 1 tablet (50 mg total) by mouth every 4 (four) hours as needed for Pain.

## 2023-08-15 ENCOUNTER — TELEPHONE (OUTPATIENT)
Dept: FAMILY MEDICINE | Facility: CLINIC | Age: 58
End: 2023-08-15
Payer: COMMERCIAL

## 2023-08-21 PROBLEM — G89.18 POST-OP PAIN: Status: RESOLVED | Noted: 2023-05-16 | Resolved: 2023-08-21

## 2023-09-08 ENCOUNTER — OFFICE VISIT (OUTPATIENT)
Dept: FAMILY MEDICINE | Facility: CLINIC | Age: 58
End: 2023-09-08
Payer: COMMERCIAL

## 2023-09-08 VITALS
HEIGHT: 70 IN | TEMPERATURE: 99 F | DIASTOLIC BLOOD PRESSURE: 82 MMHG | BODY MASS INDEX: 34.37 KG/M2 | OXYGEN SATURATION: 98 % | HEART RATE: 70 BPM | WEIGHT: 240.06 LBS | SYSTOLIC BLOOD PRESSURE: 114 MMHG

## 2023-09-08 DIAGNOSIS — M10.9 GOUT, UNSPECIFIED CAUSE, UNSPECIFIED CHRONICITY, UNSPECIFIED SITE: ICD-10-CM

## 2023-09-08 DIAGNOSIS — E66.9 CLASS 1 OBESITY WITH BODY MASS INDEX (BMI) OF 34.0 TO 34.9 IN ADULT, UNSPECIFIED OBESITY TYPE, UNSPECIFIED WHETHER SERIOUS COMORBIDITY PRESENT: ICD-10-CM

## 2023-09-08 DIAGNOSIS — I10 BENIGN ESSENTIAL HTN: Primary | ICD-10-CM

## 2023-09-08 DIAGNOSIS — R73.03 PREDIABETES: ICD-10-CM

## 2023-09-08 PROCEDURE — 99214 PR OFFICE/OUTPT VISIT, EST, LEVL IV, 30-39 MIN: ICD-10-PCS | Mod: S$GLB,,, | Performed by: FAMILY MEDICINE

## 2023-09-08 PROCEDURE — 3044F HG A1C LEVEL LT 7.0%: CPT | Mod: CPTII,S$GLB,, | Performed by: FAMILY MEDICINE

## 2023-09-08 PROCEDURE — 3044F PR MOST RECENT HEMOGLOBIN A1C LEVEL <7.0%: ICD-10-PCS | Mod: CPTII,S$GLB,, | Performed by: FAMILY MEDICINE

## 2023-09-08 PROCEDURE — 3008F BODY MASS INDEX DOCD: CPT | Mod: CPTII,S$GLB,, | Performed by: FAMILY MEDICINE

## 2023-09-08 PROCEDURE — 3008F PR BODY MASS INDEX (BMI) DOCUMENTED: ICD-10-PCS | Mod: CPTII,S$GLB,, | Performed by: FAMILY MEDICINE

## 2023-09-08 PROCEDURE — 3074F PR MOST RECENT SYSTOLIC BLOOD PRESSURE < 130 MM HG: ICD-10-PCS | Mod: CPTII,S$GLB,, | Performed by: FAMILY MEDICINE

## 2023-09-08 PROCEDURE — 99999 PR PBB SHADOW E&M-EST. PATIENT-LVL IV: ICD-10-PCS | Mod: PBBFAC,,, | Performed by: FAMILY MEDICINE

## 2023-09-08 PROCEDURE — 99999 PR PBB SHADOW E&M-EST. PATIENT-LVL IV: CPT | Mod: PBBFAC,,, | Performed by: FAMILY MEDICINE

## 2023-09-08 PROCEDURE — 1160F PR REVIEW ALL MEDS BY PRESCRIBER/CLIN PHARMACIST DOCUMENTED: ICD-10-PCS | Mod: CPTII,S$GLB,, | Performed by: FAMILY MEDICINE

## 2023-09-08 PROCEDURE — 1160F RVW MEDS BY RX/DR IN RCRD: CPT | Mod: CPTII,S$GLB,, | Performed by: FAMILY MEDICINE

## 2023-09-08 PROCEDURE — 3079F DIAST BP 80-89 MM HG: CPT | Mod: CPTII,S$GLB,, | Performed by: FAMILY MEDICINE

## 2023-09-08 PROCEDURE — 3074F SYST BP LT 130 MM HG: CPT | Mod: CPTII,S$GLB,, | Performed by: FAMILY MEDICINE

## 2023-09-08 PROCEDURE — 1159F PR MEDICATION LIST DOCUMENTED IN MEDICAL RECORD: ICD-10-PCS | Mod: CPTII,S$GLB,, | Performed by: FAMILY MEDICINE

## 2023-09-08 PROCEDURE — 3079F PR MOST RECENT DIASTOLIC BLOOD PRESSURE 80-89 MM HG: ICD-10-PCS | Mod: CPTII,S$GLB,, | Performed by: FAMILY MEDICINE

## 2023-09-08 PROCEDURE — 4010F ACE/ARB THERAPY RXD/TAKEN: CPT | Mod: CPTII,S$GLB,, | Performed by: FAMILY MEDICINE

## 2023-09-08 PROCEDURE — 4010F PR ACE/ARB THEARPY RXD/TAKEN: ICD-10-PCS | Mod: CPTII,S$GLB,, | Performed by: FAMILY MEDICINE

## 2023-09-08 PROCEDURE — 99214 OFFICE O/P EST MOD 30 MIN: CPT | Mod: S$GLB,,, | Performed by: FAMILY MEDICINE

## 2023-09-08 PROCEDURE — 1159F MED LIST DOCD IN RCRD: CPT | Mod: CPTII,S$GLB,, | Performed by: FAMILY MEDICINE

## 2023-09-08 RX ORDER — LISINOPRIL 10 MG/1
10 TABLET ORAL DAILY
Qty: 90 TABLET | Refills: 3 | Status: SHIPPED | OUTPATIENT
Start: 2023-09-08 | End: 2024-03-08

## 2023-09-08 RX ORDER — ALLOPURINOL 300 MG/1
300 TABLET ORAL DAILY
Qty: 90 TABLET | Refills: 3 | Status: SHIPPED | OUTPATIENT
Start: 2023-09-08 | End: 2024-09-02

## 2023-09-08 NOTE — PROGRESS NOTES
"Subjective:       Patient ID: Omid Falcon is a 57 y.o. male.    Chief Complaint: Hypertension (2 month f/u)    Here today to follow up on chronic medical conditions.     HTN: He is now on Lisinopril.  BP is controlled.  No issues with medication.  Neuroendocrine Tumor of bowel.  Seeing Heme/Onc.  He has had 2 different surgeries.  Was on medication, currently off.  Getting care at [facility].  He has been seeing Dr. Nesbitt (GI) who has retired, he will reach out to their office.  Prediabetes:  Last HgA1c was 5.8 in July 2023  Gout: on daily Allopurinol.  CTS:  saw hand surgeon in .  Got steroid injections.  Will be having NCS and possible surgery in the future.    Hypertension  Pertinent negatives include no chest pain, headaches, palpitations or shortness of breath.     Review of Systems   Constitutional:  Negative for appetite change, fatigue and fever.   HENT:  Negative for congestion, sneezing and sore throat.    Respiratory:  Negative for cough, shortness of breath and wheezing.    Cardiovascular:  Negative for chest pain and palpitations.   Gastrointestinal:  Negative for abdominal pain, constipation, diarrhea, nausea and vomiting.   Genitourinary:  Negative for difficulty urinating, dysuria, frequency and hematuria.   Neurological:  Negative for dizziness, syncope, weakness and headaches.   Psychiatric/Behavioral:  Negative for agitation, behavioral problems and confusion. The patient is not nervous/anxious.        Objective:      Vitals:    09/08/23 0845   BP: 114/82   BP Location: Right arm   Patient Position: Sitting   BP Method: Medium (Manual)   Pulse: 70   Temp: 99.3 °F (37.4 °C)   TempSrc: Temporal   SpO2: 98%   Weight: 108.9 kg (240 lb 1.3 oz)   Height: 5' 10" (1.778 m)      Physical Exam  Constitutional:       General: He is not in acute distress.  Cardiovascular:      Rate and Rhythm: Normal rate and regular rhythm.      Heart sounds: Normal heart sounds. No murmur heard.  Pulmonary:      Effort: " Pulmonary effort is normal. No respiratory distress.      Breath sounds: Normal breath sounds. No wheezing, rhonchi or rales.   Skin:     General: Skin is warm and dry.   Neurological:      General: No focal deficit present.      Mental Status: He is alert.   Psychiatric:         Mood and Affect: Mood normal.         Behavior: Behavior normal.         Thought Content: Thought content normal.         Results for orders placed or performed in visit on 07/11/23    COLONOSCOPY   Result Value Ref Range    CRC Recommendation External Repeat colonoscopy in 3 years       Assessment:       1. Benign essential HTN    2. Prediabetes    3. Gout, unspecified cause, unspecified chronicity, unspecified site    4. Class 1 obesity with body mass index (BMI) of 34.0 to 34.9 in adult, unspecified obesity type, unspecified whether serious comorbidity present        Plan:       Benign essential HTN  -     lisinopriL 10 MG tablet; Take 1 tablet (10 mg total) by mouth once daily.  Dispense: 90 tablet; Refill: 3  Continue Lisinopril  Prediabetes  Continue to work on diet and exercise  Gout, unspecified cause, unspecified chronicity, unspecified site  -     allopurinoL (ZYLOPRIM) 300 MG tablet; Take 1 tablet (300 mg total) by mouth once daily.  Dispense: 90 tablet; Refill: 3  Continue Allopurinol.  Class 1 obesity with body mass index (BMI) of 34.0 to 34.9 in adult, unspecified obesity type, unspecified whether serious comorbidity present    He is due repeat colonoscopy and will reach out to his previous GI office.  If he needs a referral to a different GI, he will reach out to us      Medication List with Changes/Refills   Current Medications    ACETAMINOPHEN (TYLENOL) 325 MG TABLET    Take 2 tablets (650 mg total) by mouth every 8 (eight) hours as needed for Pain.    ANASTROZOLE (ARIMIDEX) 1 MG TAB    Take 1 mg by mouth once a week.    ASPIRIN (ECOTRIN) 81 MG EC TABLET    Take 81 mg by mouth once daily.    B COMPLEX VITAMINS CAPSULE     Take 1 capsule by mouth once daily.    BLOOD SUGAR DIAGNOSTIC STRP    1 strip by Other route.    CHOLESTYRAMINE (QUESTRAN) 4 GRAM PACKET    TAKE 1\2 PACKET BY MOUTH THREE TIMES DAILY WITH MEALS    COLCHICINE (COLCRYS) 0.6 MG TABLET    1.2 mg PO x 1, then 0.6 mg PO 1 hour later x 1 (do not repeat again for 3 days)    INDOMETHACIN (INDOCIN) 50 MG CAPSULE    Take 50 mg by mouth 2 (two) times daily.    INV TESTOSTERONE/ANASTRAZOLE 60MG/4MG PELLET OR PLACEBO    Inject 2 Pellet into the skin. FOR INVESTIGATIONAL USE ONLY    MULTIVITAMIN CAPSULE    Take 1 capsule by mouth once daily.   Changed and/or Refilled Medications    Modified Medication Previous Medication    ALLOPURINOL (ZYLOPRIM) 300 MG TABLET allopurinoL (ZYLOPRIM) 300 MG tablet       Take 1 tablet (300 mg total) by mouth once daily.    Take 1 tablet (300 mg total) by mouth once daily.    LISINOPRIL 10 MG TABLET lisinopriL 10 MG tablet       Take 1 tablet (10 mg total) by mouth once daily.    Take 1 tablet (10 mg total) by mouth once daily.

## 2023-10-11 DIAGNOSIS — M10.9 GOUT, UNSPECIFIED CAUSE, UNSPECIFIED CHRONICITY, UNSPECIFIED SITE: ICD-10-CM

## 2023-10-11 RX ORDER — COLCHICINE 0.6 MG/1
TABLET ORAL
Qty: 30 TABLET | Refills: 0 | Status: SHIPPED | OUTPATIENT
Start: 2023-10-11

## 2023-10-11 NOTE — TELEPHONE ENCOUNTER
Care Due:                  Date            Visit Type   Department     Provider  --------------------------------------------------------------------------------                                EP -                              [facility] FAMILY  Last Visit: 09-      CARE ([facility])   ADDISON Lizama                              EP -                              PRIMARY      [facility] FAM[facility]  Next Visit: 03-      CARE ([facility])   ADDISON Lizama                                                            Last  Test          Frequency    Reason                     Performed    Due Date  --------------------------------------------------------------------------------    Uric Acid...  12 months..  allopurinoL, colchicine..  Not Found    Overdue    [employer] Embedded Care Due Messages. Reference number: [id number].   10/11/2023 9:09:23 AM CDT

## 2023-10-11 NOTE — TELEPHONE ENCOUNTER
Refill Routing Note   Medication(s) are not appropriate for processing by [facility] for the following reason(s):      Required labs outdated  New or recently adjusted medication    [facility] action(s):  Defer Care Due:  Labs due            Appointments  past 12m or future 3m with PCP    Date Provider   Last Visit   9/8/2023 Josh Lizama MD   Next Visit   3/8/2024 Josh Lizama MD   ED visits in past 90 days: 0        Note composed:10:28 AM 10/11/2023

## 2023-10-30 ENCOUNTER — PATIENT MESSAGE (OUTPATIENT)
Dept: FAMILY MEDICINE | Facility: CLINIC | Age: 58
End: 2023-10-30
Payer: COMMERCIAL

## 2023-12-21 ENCOUNTER — OFFICE VISIT (OUTPATIENT)
Dept: FAMILY MEDICINE | Facility: CLINIC | Age: 58
End: 2023-12-21
Payer: COMMERCIAL

## 2023-12-21 ENCOUNTER — HOSPITAL ENCOUNTER (OUTPATIENT)
Dept: RADIOLOGY | Facility: HOSPITAL | Age: 58
Discharge: HOME | End: 2023-12-21
Attending: NURSE PRACTITIONER
Payer: COMMERCIAL

## 2023-12-21 VITALS
DIASTOLIC BLOOD PRESSURE: 78 MMHG | HEART RATE: 72 BPM | BODY MASS INDEX: 34.59 KG/M2 | WEIGHT: 241.63 LBS | SYSTOLIC BLOOD PRESSURE: 122 MMHG | HEIGHT: 70 IN | OXYGEN SATURATION: 97 %

## 2023-12-21 DIAGNOSIS — R05.9 COUGH, UNSPECIFIED TYPE: ICD-10-CM

## 2023-12-21 DIAGNOSIS — M79.672 PAIN OF LEFT HEEL: ICD-10-CM

## 2023-12-21 DIAGNOSIS — M79.672 PAIN OF LEFT HEEL: Primary | ICD-10-CM

## 2023-12-21 PROCEDURE — 3044F HG A1C LEVEL LT 7.0%: CPT | Mod: CPTII,S$GLB,, | Performed by: NURSE PRACTITIONER

## 2023-12-21 PROCEDURE — 1160F RVW MEDS BY RX/DR IN RCRD: CPT | Mod: CPTII,S$GLB,, | Performed by: NURSE PRACTITIONER

## 2023-12-21 PROCEDURE — 3044F PR MOST RECENT HEMOGLOBIN A1C LEVEL <7.0%: ICD-10-PCS | Mod: CPTII,S$GLB,, | Performed by: NURSE PRACTITIONER

## 2023-12-21 PROCEDURE — 3074F PR MOST RECENT SYSTOLIC BLOOD PRESSURE < 130 MM HG: ICD-10-PCS | Mod: CPTII,S$GLB,, | Performed by: NURSE PRACTITIONER

## 2023-12-21 PROCEDURE — 99214 OFFICE O/P EST MOD 30 MIN: CPT | Mod: S$GLB,,, | Performed by: NURSE PRACTITIONER

## 2023-12-21 PROCEDURE — 4010F ACE/ARB THERAPY RXD/TAKEN: CPT | Mod: CPTII,S$GLB,, | Performed by: NURSE PRACTITIONER

## 2023-12-21 PROCEDURE — 3074F SYST BP LT 130 MM HG: CPT | Mod: CPTII,S$GLB,, | Performed by: NURSE PRACTITIONER

## 2023-12-21 PROCEDURE — 1160F PR REVIEW ALL MEDS BY PRESCRIBER/CLIN PHARMACIST DOCUMENTED: ICD-10-PCS | Mod: CPTII,S$GLB,, | Performed by: NURSE PRACTITIONER

## 2023-12-21 PROCEDURE — 4010F PR ACE/ARB THEARPY RXD/TAKEN: ICD-10-PCS | Mod: CPTII,S$GLB,, | Performed by: NURSE PRACTITIONER

## 2023-12-21 PROCEDURE — 1159F MED LIST DOCD IN RCRD: CPT | Mod: CPTII,S$GLB,, | Performed by: NURSE PRACTITIONER

## 2023-12-21 PROCEDURE — 1159F PR MEDICATION LIST DOCUMENTED IN MEDICAL RECORD: ICD-10-PCS | Mod: CPTII,S$GLB,, | Performed by: NURSE PRACTITIONER

## 2023-12-21 PROCEDURE — 99999 PR PBB SHADOW E&M-EST. PATIENT-LVL V: ICD-10-PCS | Mod: PBBFAC,,, | Performed by: NURSE PRACTITIONER

## 2023-12-21 PROCEDURE — 99999 PR PBB SHADOW E&M-EST. PATIENT-LVL V: CPT | Mod: PBBFAC,,, | Performed by: NURSE PRACTITIONER

## 2023-12-21 PROCEDURE — 73630 X-RAY EXAM OF FOOT: CPT | Mod: 26,LT,, | Performed by: RADIOLOGY

## 2023-12-21 PROCEDURE — 3008F BODY MASS INDEX DOCD: CPT | Mod: CPTII,S$GLB,, | Performed by: NURSE PRACTITIONER

## 2023-12-21 PROCEDURE — 3078F DIAST BP <80 MM HG: CPT | Mod: CPTII,S$GLB,, | Performed by: NURSE PRACTITIONER

## 2023-12-21 PROCEDURE — 73630 XR FOOT COMPLETE 3 VIEW LEFT: ICD-10-PCS | Mod: 26,LT,, | Performed by: RADIOLOGY

## 2023-12-21 PROCEDURE — 3008F PR BODY MASS INDEX (BMI) DOCUMENTED: ICD-10-PCS | Mod: CPTII,S$GLB,, | Performed by: NURSE PRACTITIONER

## 2023-12-21 PROCEDURE — 99214 PR OFFICE/OUTPT VISIT, EST, LEVL IV, 30-39 MIN: ICD-10-PCS | Mod: S$GLB,,, | Performed by: NURSE PRACTITIONER

## 2023-12-21 PROCEDURE — 73630 X-RAY EXAM OF FOOT: CPT | Mod: TC,FY,PO,LT

## 2023-12-21 PROCEDURE — 3078F PR MOST RECENT DIASTOLIC BLOOD PRESSURE < 80 MM HG: ICD-10-PCS | Mod: CPTII,S$GLB,, | Performed by: NURSE PRACTITIONER

## 2023-12-21 RX ORDER — METHYLPREDNISOLONE 4 MG/1
TABLET ORAL
Qty: 21 EACH | Refills: 0 | Status: SHIPPED | OUTPATIENT
Start: 2023-12-21 | End: 2024-01-11

## 2023-12-21 NOTE — PROGRESS NOTES
Subjective     Patient ID: Omid Falcon is a 58 y.o. male.    Chief Complaint: Cough (More than 2 weeks) and Foot Injury (Heel  part of foot)    Left Foot pain x 8 weeks, has very gradually improved but not resolved. No injury that he is aware of. Has tried insole and stretching. Pain is mainly on initiation of weight bearing. Patient also has sinus drip and cough for weeks.     Cough  Associated symptoms include rhinorrhea. Pertinent negatives include no fever, sore throat, shortness of breath or wheezing.   Foot Injury       Review of Systems   Constitutional:  Negative for diaphoresis and fever.   HENT:  Positive for rhinorrhea. Negative for sinus pressure/congestion, sneezing and sore throat.    Respiratory:  Positive for cough. Negative for shortness of breath and wheezing.    Cardiovascular: Negative.    Gastrointestinal: Negative.    Genitourinary: Negative.    Musculoskeletal:  Positive for arthralgias.   Neurological: Negative.           Objective     Physical Exam  Constitutional:       Appearance: Normal appearance.   HENT:      Head: Normocephalic and atraumatic.      Nose: Nose normal.      Mouth/Throat:      Pharynx: No posterior oropharyngeal erythema.   Cardiovascular:      Rate and Rhythm: Normal rate and regular rhythm.   Pulmonary:      Effort: Pulmonary effort is normal. No respiratory distress.      Breath sounds: Normal breath sounds.   Musculoskeletal:         General: No swelling.      Right lower leg: No edema.      Left lower leg: No edema.      Right foot: No swelling or deformity.      Left foot: Normal range of motion. Tenderness present. No swelling or bony tenderness.        Legs:    Skin:     Findings: No bruising or erythema.   Neurological:      Mental Status: He is alert and oriented to person, place, and time.   Psychiatric:         Mood and Affect: Mood normal.         Behavior: Behavior normal.            Assessment and Plan     1. Pain of left heel  -     X-Ray Foot  Complete 3 view Left; Future; Expected date: 12/21/2023  -     methylPREDNISolone (MEDROL DOSEPACK) 4 mg tablet; use as directed  Dispense: 21 each; Refill: 0  -     Ambulatory referral/consult to Podiatry; Future; Expected date: 12/28/2023    2. Cough, unspecified type    Treat cough with otc antihistamine.    Follow up with podiatry if symptoms are not resolving with treatment, follow up immediately for new or worsening symptoms, ED precautions discussed.           No follow-ups on file.

## 2024-01-26 ENCOUNTER — OFFICE VISIT (OUTPATIENT)
Dept: PODIATRY | Facility: CLINIC | Age: 59
End: 2024-01-26
Payer: COMMERCIAL

## 2024-01-26 VITALS — WEIGHT: 241 LBS | BODY MASS INDEX: 34.5 KG/M2 | HEIGHT: 70 IN

## 2024-01-26 DIAGNOSIS — M62.462 GASTROCNEMIUS EQUINUS OF LEFT LOWER EXTREMITY: ICD-10-CM

## 2024-01-26 DIAGNOSIS — M72.2 PLANTAR FASCIITIS: ICD-10-CM

## 2024-01-26 DIAGNOSIS — M79.672 PAIN OF LEFT HEEL: Primary | ICD-10-CM

## 2024-01-26 PROCEDURE — 99203 OFFICE O/P NEW LOW 30 MIN: CPT | Mod: 25,S$GLB,, | Performed by: PODIATRIST

## 2024-01-26 PROCEDURE — 99999 PR PBB SHADOW E&M-EST. PATIENT-LVL IV: CPT | Mod: PBBFAC,,, | Performed by: PODIATRIST

## 2024-01-26 PROCEDURE — 20550 NJX 1 TENDON SHEATH/LIGAMENT: CPT | Mod: LT,S$GLB,, | Performed by: PODIATRIST

## 2024-01-26 PROCEDURE — 3008F BODY MASS INDEX DOCD: CPT | Mod: CPTII,S$GLB,, | Performed by: PODIATRIST

## 2024-01-26 PROCEDURE — 1159F MED LIST DOCD IN RCRD: CPT | Mod: CPTII,S$GLB,, | Performed by: PODIATRIST

## 2024-01-26 RX ORDER — TRIAMCINOLONE ACETONIDE 40 MG/ML
20 INJECTION, SUSPENSION INTRA-ARTICULAR; INTRAMUSCULAR ONCE
Status: COMPLETED | OUTPATIENT
Start: 2024-01-26 | End: 2024-01-26

## 2024-01-26 RX ORDER — LIDOCAINE HYDROCHLORIDE 10 MG/ML
1 INJECTION INFILTRATION; PERINEURAL
Status: COMPLETED | OUTPATIENT
Start: 2024-01-26 | End: 2024-01-26

## 2024-01-26 RX ORDER — DEXAMETHASONE SODIUM PHOSPHATE 4 MG/ML
2 INJECTION, SOLUTION INTRA-ARTICULAR; INTRALESIONAL; INTRAMUSCULAR; INTRAVENOUS; SOFT TISSUE
Status: COMPLETED | OUTPATIENT
Start: 2024-01-26 | End: 2024-01-26

## 2024-01-26 RX ADMIN — TRIAMCINOLONE ACETONIDE 20 MG: 40 INJECTION, SUSPENSION INTRA-ARTICULAR; INTRAMUSCULAR at 07:01

## 2024-01-26 RX ADMIN — DEXAMETHASONE SODIUM PHOSPHATE 2 MG: 4 INJECTION, SOLUTION INTRA-ARTICULAR; INTRALESIONAL; INTRAMUSCULAR; INTRAVENOUS; SOFT TISSUE at 07:01

## 2024-01-26 RX ADMIN — LIDOCAINE HYDROCHLORIDE 1 ML: 10 INJECTION INFILTRATION; PERINEURAL at 07:01

## 2024-01-26 NOTE — PROGRESS NOTES
Subjective:     Patient ID: Omid Falcon is a 58 y.o. male.    Chief Complaint: Foot Pain    Omid is a 58 y.o. 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 The patient's chief complaint consists of Lt. Heel pain that has been present since October.  Describes sharp pain that is exacerbated with weight bearing after periods of rest.  Rates pain as a 3/10.  Symptoms improve with continued weight bearing.  He has been wearing supportive shoe gear, plantar fasciitis socks, and massaging the site with a ball with over 70% improvement.  Inquires as to how this can be completely resolved.  Denies sustaining trauma to the affected area.  Denies any additional pedal complaints.      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    PONV (postoperative nausea and vomiting)        Past Surgical History:   Procedure Laterality Date    ABDOMINAL HERNIA REPAIR Bilateral 06/18/2018    Procedure: REPAIR-HERNIA-INCISIONAL-INITIAL with mesh;  Surgeon: NEEL Gould MD;  Location: [facility] OR;  Service: General;  Laterality: Bilateral;    BACK SURGERY      CHOLECYSTECTOMY N/A 06/18/2018    Procedure: CHOLECYSTECTOMY;  Surgeon: NEEL Gould MD;  Location: [facility] OR;  Service: General;  Laterality: N/A;    COLECTOMY, RIGHT Right 02/02/2016    [facility]    COLONOSCOPY N/A 07/24/2017    repeat in 2-3 yrs    EXPLORATORY LAPAROTOMY N/A 06/18/2018    Procedure: EXPLORATORY-LAPAROTOMY;  Surgeon: NEEL Gould MD;  Location: [facility] OR;  Service: General;  Laterality: N/A;    KNEE SURGERY      LIVER RESECTION N/A  06/18/2018    Procedure: RESECTION-HEPATIC;  Surgeon: NEEL Gould MD;  Location: [facility] OR;  Service: General;  Laterality: N/A;    ROBOTIC ARTHROPLASTY, KNEE Right 05/16/2023    Procedure: ROBOTIC ARTHROPLASTY, KNEE, TOTAL-ROSEMARIE;  Surgeon: Shamar Yanes MD;  Location: [facility] OR;  Service: Orthopedics;  Laterality: Right;    SMALL INTESTINE SURGERY N/A 02/02/2016    distal small bowel resection       Family History   Problem Relation Age of Onset    Cancer Mother     Diabetes Father     Diabetes Brother     Retinal detachment Neg Hx     Glaucoma Neg Hx     Macular degeneration Neg Hx        Social History     Socioeconomic History    Marital status:    Tobacco Use    Smoking status: Never    Smokeless tobacco: Former     Types: Chew    Tobacco comments:     chews once a month   Substance and Sexual Activity    Alcohol use: Yes     Comment: occas    Sexual activity: Yes     Partners: Female     Social Determinants of Health     Financial Resource Strain: Patient Declined (12/21/2023)    Overall Financial Resource Strain (CARDIA)     Difficulty of Paying Living Expenses: Patient declined   Food Insecurity: Patient Declined (12/21/2023)    Hunger Vital Sign     Worried About Running Out of Food in the Last Year: Patient declined     Ran Out of Food in the Last Year: Patient declined   Transportation Needs: Patient Declined (12/21/2023)    PRAPARE - Transportation     Lack of Transportation (Medical): Patient declined     Lack of Transportation (Non-Medical): Patient declined   Physical Activity: Unknown (12/21/2023)    Exercise Vital Sign     Days of Exercise per Week: 0 days   Stress: No Stress Concern Present (12/21/2023)    [employer] - Occupational Stress Questionnaire     Feeling of Stress : Not at all   Social Connections: Unknown (12/21/2023)    Social Connection and Isolation Panel [NHANES]     Frequency of Communication with Friends and Family: Patient declined      Frequency of Social Gatherings with Friends and Family: Patient declined     Attends [employer] Services: More than 4 times per year     Active Member of Clubs or Organizations: Patient declined     Attends Club or Organization Meetings: Patient declined     Marital Status: Patient declined   Housing Stability: Patient Declined (12/21/2023)    Housing Stability Vital Sign     Unable to Pay for Housing in the Last Year: Patient declined     Number of Places Lived in the Last Year: 1     Unstable Housing in the Last Year: Patient declined       Current Outpatient Medications   Medication Sig Dispense Refill    allopurinoL (ZYLOPRIM) 300 MG tablet Take 1 tablet (300 mg total) by mouth once daily. 90 tablet 3    b complex vitamins capsule Take 1 capsule by mouth once daily.      blood sugar diagnostic Strp 1 strip by Other route.      cholestyramine (QUESTRAN) 4 gram packet TAKE 1\2 PACKET BY MOUTH THREE TIMES DAILY WITH MEALS 60 packet 11    colchicine, gout, (COLCRYS) 0.6 mg tablet 1.2 mg PO x 1, then 0.6 mg PO 1 hour later x 1 (do not repeat again for 3 days) 30 tablet 0    lisinopriL 10 MG tablet Take 1 tablet (10 mg total) by mouth once daily. 90 tablet 3    multivitamin capsule Take 1 capsule by mouth once daily.      acetaminophen (TYLENOL) 325 MG tablet Take 2 tablets (650 mg total) by mouth every 8 (eight) hours as needed for Pain.  0    anastrozole (ARIMIDEX) 1 mg Tab Take 1 mg by mouth once a week.      aspirin (ECOTRIN) 81 MG EC tablet Take 81 mg by mouth once daily.      indomethacin (INDOCIN) 50 MG capsule Take 50 mg by mouth 2 (two) times daily.      INV TESTOSTERONE/ANASTRAZOLE 60MG/4MG PELLET OR PLACEBO Inject 2 Pellet into the skin. FOR INVESTIGATIONAL USE ONLY       No current facility-administered medications for this visit.       Review of patient's allergies indicates:   Allergen Reactions    Ceftriaxone Hives, Itching and Other (See Comments)     Coughing and rhinitis  Flushing  feeling  Coughing and rhinitis  Other reaction(s): Other (See Comments)  Coughing and rhinitis  Flushing feeling    Epinephrine Other (See Comments)     With chemo meds      Will cause cardiac arrest-per patient    Dermabond [tissue glues] Hives    Adhesive Hives, Other (See Comments) and Rash        Hemoglobin A1C   Date Value Ref Range Status   07/11/2023 5.8 (H) 4.0 - 5.6 % Final     Comment:     ADA Screening Guidelines:  5.7-6.4%  Consistent with prediabetes  >or=6.5%  Consistent with diabetes    High levels of fetal hemoglobin interfere with the HbA1C  assay. Heterozygous hemoglobin variants (HbS, HgC, etc)do  not significantly interfere with this assay.   However, presence of multiple variants may affect accuracy.     09/09/2022 5.7 (H) 4.0 - 5.6 % Final     Comment:     ADA Screening Guidelines:  5.7-6.4%  Consistent with prediabetes  >or=6.5%  Consistent with diabetes    High levels of fetal hemoglobin interfere with the HbA1C  assay. Heterozygous hemoglobin variants (HbS, HgC, etc)do  not significantly interfere with this assay.   However, presence of multiple variants may affect accuracy.     12/21/2021 5.8 (H) 4.0 - 5.6 % Final     Comment:     ADA Screening Guidelines:  5.7-6.4%  Consistent with prediabetes  >or=6.5%  Consistent with diabetes    High levels of fetal hemoglobin interfere with the HbA1C  assay. Heterozygous hemoglobin variants (HbS, HgC, etc)do  not significantly interfere with this assay.   However, presence of multiple variants may affect accuracy.         Review of Systems   Constitutional: Negative for chills and fever.   Cardiovascular:  Negative for claudication and leg swelling.   Skin:  Negative for color change and nail changes.   Musculoskeletal:  Positive for myalgias. Negative for joint pain, joint swelling, muscle cramps and muscle weakness.   Gastrointestinal:  Negative for nausea and vomiting.   Neurological:  Negative for numbness and paresthesias.    Psychiatric/Behavioral:  Negative for altered mental status.         Objective:     Physical Exam  Constitutional:       Appearance: Normal appearance. He is not ill-appearing.   Cardiovascular:      Pulses:           Dorsalis pedis pulses are 2+ on the right side and 2+ on the left side.        Posterior tibial pulses are 2+ on the right side and 2+ on the left side.      Comments: CFT is < 3 seconds bilateral.  Pedal hair growth is present bilateral.  No lower extremity edema noted bilateral.  Toes are warm to touch bilateral.    Musculoskeletal:         General: Tenderness present. No signs of injury.      Right lower leg: No edema.      Left lower leg: No edema.      Comments: Muscle strength 5/5 in all muscle groups bilateral.  No tenderness nor crepitation with ROM of foot/ankle joints bilateral.  Pain with palpation to the Lt. Medial calcaneal tubercle and plantar central heel.  Lt. Sided gastrocnemius equinus.     Skin:     General: Skin is warm and dry.      Capillary Refill: Capillary refill takes 2 to 3 seconds.      Findings: No bruising, ecchymosis, erythema, signs of injury, laceration, lesion, petechiae, rash or wound.      Comments: Pedal skin has normal turgor, temperature, and texture bilateral.  Toenails x 10 appear normotrophic. Examination of the skin reveals no evidence of significant maceration, rashes, open lesions, suspicious appearing nevi or other concerning lesions.       Neurological:      General: No focal deficit present.      Mental Status: He is alert.      Sensory: No sensory deficit.      Motor: No weakness or atrophy.      Comments: Light touch is intact bilateral.             Assessment:      Encounter Diagnoses   Name Primary?    Pain of left heel Yes    Gastrocnemius equinus of left lower extremity     Plantar fasciitis      Plan:     Omid was seen today for foot pain.    Diagnoses and all orders for this visit:    Pain of left heel  -     Ambulatory referral/consult to  Podiatry    Gastrocnemius equinus of left lower extremity  -     Ambulatory referral/consult to Physical/Occupational Therapy; Future    Plantar fasciitis  -     Ambulatory referral/consult to Physical/Occupational Therapy; Future  -     LIDOcaine HCL 10 mg/ml (1%) injection 1 mL  -     dexAMETHasone injection 2 mg  -     triamcinolone acetonide injection 20 mg      I counseled the patient on his conditions, their implications and medical management.    - After sterilizing the area with an alcohol prep and applying ethyl chloride, the affected area of the Lt. Medial heel was injected as per MAR.  The patient tolerated the injection well, with minimal blood loss noted from the injection site.  The injection site was then covered with a bandage.  Patient tolerated this quite well.         - Discussed performing stretching exercises to address bilateral equinus.     - Recommend wearing supportive shoes only.  Discussed avoidance of barefoot walking, flip flops, and Crocs, as this will exacerbate current symptoms.       - Recommend icing the affected heel a minimum of 20 minutes daily.     - Discussed avoidance of high impact activities such as squatting, stooping, and running as these activities will exacerbate symptoms.       - May consider applying a topical analgesic (Voltaren) to help with pain symptoms.      - Referral written for PT for astym and dry needling.     - RTC prn or sooner if symptoms fail to resolve within 6 weeks.       Mir Sarah DPM

## 2024-03-08 ENCOUNTER — OFFICE VISIT (OUTPATIENT)
Dept: FAMILY MEDICINE | Facility: CLINIC | Age: 59
End: 2024-03-08
Payer: COMMERCIAL

## 2024-03-08 VITALS
BODY MASS INDEX: 34.88 KG/M2 | WEIGHT: 243.63 LBS | DIASTOLIC BLOOD PRESSURE: 84 MMHG | OXYGEN SATURATION: 97 % | HEIGHT: 70 IN | HEART RATE: 110 BPM | TEMPERATURE: 98 F | SYSTOLIC BLOOD PRESSURE: 126 MMHG

## 2024-03-08 DIAGNOSIS — R73.03 PREDIABETES: ICD-10-CM

## 2024-03-08 DIAGNOSIS — Z12.11 COLON CANCER SCREENING: ICD-10-CM

## 2024-03-08 DIAGNOSIS — I10 BENIGN ESSENTIAL HTN: ICD-10-CM

## 2024-03-08 DIAGNOSIS — M10.9 GOUT, UNSPECIFIED CAUSE, UNSPECIFIED CHRONICITY, UNSPECIFIED SITE: ICD-10-CM

## 2024-03-08 DIAGNOSIS — Z00.00 WELL ADULT EXAM: Primary | ICD-10-CM

## 2024-03-08 PROCEDURE — 3074F SYST BP LT 130 MM HG: CPT | Mod: CPTII,S$GLB,, | Performed by: FAMILY MEDICINE

## 2024-03-08 PROCEDURE — 99396 PREV VISIT EST AGE 40-64: CPT | Mod: S$GLB,,, | Performed by: FAMILY MEDICINE

## 2024-03-08 PROCEDURE — 99999 PR PBB SHADOW E&M-EST. PATIENT-LVL V: CPT | Mod: PBBFAC,,, | Performed by: FAMILY MEDICINE

## 2024-03-08 PROCEDURE — 1160F RVW MEDS BY RX/DR IN RCRD: CPT | Mod: CPTII,S$GLB,, | Performed by: FAMILY MEDICINE

## 2024-03-08 PROCEDURE — 1159F MED LIST DOCD IN RCRD: CPT | Mod: CPTII,S$GLB,, | Performed by: FAMILY MEDICINE

## 2024-03-08 PROCEDURE — 3008F BODY MASS INDEX DOCD: CPT | Mod: CPTII,S$GLB,, | Performed by: FAMILY MEDICINE

## 2024-03-08 PROCEDURE — 3079F DIAST BP 80-89 MM HG: CPT | Mod: CPTII,S$GLB,, | Performed by: FAMILY MEDICINE

## 2024-03-08 RX ORDER — VALSARTAN 40 MG/1
40 TABLET ORAL DAILY
Qty: 90 TABLET | Refills: 3 | Status: SHIPPED | OUTPATIENT
Start: 2024-03-08 | End: 2024-06-10 | Stop reason: SDUPTHER

## 2024-03-08 NOTE — PROGRESS NOTES
"Subjective:       Patient ID: Omid Falcon is a 58 y.o. male.    Chief Complaint: Hypertension    Patient here today for annual well adult exam and to follow up on chronic medical conditions.   Tries to eat a healthy diet.  Not exercising regularly.    Immunizations: Due Shingrix #2  Last Lab Work: 2024  Colon Ca screening:   Prostate Ca Screening: PSA 2024     HTN: He is now on Lisinopril.  BP is controlled. But, he is having a dry cough.  Neuroendocrine Tumor of bowel.  Seeing Heme/Onc.  He has had 2 different surgeries.  Was on medication, currently off.  Getting care at [facility].  He has been seeing Dr. Nesbitt (GI) who has retired, he will reach out to their office.  Prediabetes:  Last HgA1c was 5.8 in July 2023  Gout: on daily Allopurinol.  CTS: had surgery in Dec.      Review of Systems   Constitutional:  Negative for appetite change, fatigue and fever.   HENT:  Negative for congestion, sneezing and sore throat.    Respiratory:  Negative for cough, shortness of breath and wheezing.    Cardiovascular:  Negative for chest pain and palpitations.   Gastrointestinal:  Negative for abdominal pain, constipation, diarrhea, nausea and vomiting.   Genitourinary:  Negative for difficulty urinating, dysuria, frequency and hematuria.   Neurological:  Negative for dizziness, syncope, weakness and headaches.   Psychiatric/Behavioral:  Negative for agitation, behavioral problems and confusion. The patient is not nervous/anxious.        Objective:      Vitals:    03/08/24 1346   BP: 126/84   Pulse: 110   Temp: 97.9 °F (36.6 °C)   TempSrc: Oral   SpO2: 97%   Weight: 110.5 kg (243 lb 9.7 oz)   Height: 5' 10" (1.778 m)      Physical Exam  Constitutional:       General: He is not in acute distress.  Cardiovascular:      Rate and Rhythm: Normal rate and regular rhythm.      Heart sounds: Normal heart sounds. No murmur heard.  Pulmonary:      Effort: Pulmonary effort is normal. No respiratory distress.      Breath sounds: Normal " breath sounds. No wheezing, rhonchi or rales.   Musculoskeletal:         General: No swelling.   Skin:     General: Skin is warm and dry.   Neurological:      General: No focal deficit present.      Mental Status: He is alert.   Psychiatric:         Mood and Affect: Mood normal.         Behavior: Behavior normal.         Thought Content: Thought content normal.         Results for orders placed or performed in visit on 03/04/24   CULTURE, AEROBIC  (SPECIFY SOURCE)    Specimen: Back   Result Value Ref Range    CULTURE, AEROBIC AND ANAEROBIC        Assessment:       1. Well adult exam    2. Benign essential HTN    3. Gout, unspecified cause, unspecified chronicity, unspecified site    4. Prediabetes    5. Colon cancer screening        Plan:       Well adult exam  Continue to work on dietary improvements (decrease overall calorie intake, decrease sugar and carb intake, decrease animal protein intake)  Continue to exercise at least 30-40 minutes, 3 times per week  Immunizations were discussed and Shingrix #2 from pharmacy  Preventative exams were discussed and up to date   Benign essential HTN  -     valsartan (DIOVAN) 40 MG tablet; Take 1 tablet (40 mg total) by mouth once daily.  Dispense: 90 tablet; Refill: 3  Change ACE-I to ARB today  Gout, unspecified cause, unspecified chronicity, unspecified site  Continue allopurinol.  Prediabetes  stable  Colon cancer screening  -     Ambulatory referral/consult to Gastroenterology; Future; Expected date: 03/15/2024  Referral back to GI.        Medication List with Changes/Refills   New Medications    VALSARTAN (DIOVAN) 40 MG TABLET    Take 1 tablet (40 mg total) by mouth once daily.   Current Medications    ALLOPURINOL (ZYLOPRIM) 300 MG TABLET    Take 1 tablet (300 mg total) by mouth once daily.    B COMPLEX VITAMINS CAPSULE    Take 1 capsule by mouth once daily.    BLOOD SUGAR DIAGNOSTIC STRP    1 strip by Other route.    CHOLESTYRAMINE (QUESTRAN) 4 GRAM PACKET    TAKE 1\2  PACKET BY MOUTH THREE TIMES DAILY WITH MEALS    COLCHICINE, GOUT, (COLCRYS) 0.6 MG TABLET    1.2 mg PO x 1, then 0.6 mg PO 1 hour later x 1 (do not repeat again for 3 days)    DOXYCYCLINE (MONODOX) 100 MG CAPSULE    Take 1 capsule (100 mg total) by mouth 2 (two) times daily. Take with food and water. for 7 days    MULTIVITAMIN CAPSULE    Take 1 capsule by mouth once daily.   Discontinued Medications    LISINOPRIL 10 MG TABLET    Take 1 tablet (10 mg total) by mouth once daily.

## 2024-06-10 ENCOUNTER — OFFICE VISIT (OUTPATIENT)
Dept: FAMILY MEDICINE | Facility: CLINIC | Age: 59
End: 2024-06-10
Payer: COMMERCIAL

## 2024-06-10 VITALS
SYSTOLIC BLOOD PRESSURE: 118 MMHG | DIASTOLIC BLOOD PRESSURE: 86 MMHG | HEART RATE: 83 BPM | WEIGHT: 239.88 LBS | OXYGEN SATURATION: 98 % | HEIGHT: 70 IN | BODY MASS INDEX: 34.34 KG/M2

## 2024-06-10 DIAGNOSIS — I10 BENIGN ESSENTIAL HTN: Primary | ICD-10-CM

## 2024-06-10 DIAGNOSIS — R05.9 COUGH, UNSPECIFIED TYPE: ICD-10-CM

## 2024-06-10 DIAGNOSIS — M65.30 TRIGGER FINGER, UNSPECIFIED FINGER, UNSPECIFIED LATERALITY: ICD-10-CM

## 2024-06-10 LAB — CRC RECOMMENDATION EXT: NORMAL

## 2024-06-10 PROCEDURE — 3074F SYST BP LT 130 MM HG: CPT | Mod: CPTII,S$GLB,, | Performed by: FAMILY MEDICINE

## 2024-06-10 PROCEDURE — 4010F ACE/ARB THERAPY RXD/TAKEN: CPT | Mod: CPTII,S$GLB,, | Performed by: FAMILY MEDICINE

## 2024-06-10 PROCEDURE — 1160F RVW MEDS BY RX/DR IN RCRD: CPT | Mod: CPTII,S$GLB,, | Performed by: FAMILY MEDICINE

## 2024-06-10 PROCEDURE — 99214 OFFICE O/P EST MOD 30 MIN: CPT | Mod: S$GLB,,, | Performed by: FAMILY MEDICINE

## 2024-06-10 PROCEDURE — 3008F BODY MASS INDEX DOCD: CPT | Mod: CPTII,S$GLB,, | Performed by: FAMILY MEDICINE

## 2024-06-10 PROCEDURE — 1159F MED LIST DOCD IN RCRD: CPT | Mod: CPTII,S$GLB,, | Performed by: FAMILY MEDICINE

## 2024-06-10 PROCEDURE — 99999 PR PBB SHADOW E&M-EST. PATIENT-LVL IV: CPT | Mod: PBBFAC,,, | Performed by: FAMILY MEDICINE

## 2024-06-10 PROCEDURE — 3079F DIAST BP 80-89 MM HG: CPT | Mod: CPTII,S$GLB,, | Performed by: FAMILY MEDICINE

## 2024-06-10 RX ORDER — PANTOPRAZOLE SODIUM 40 MG/1
40 TABLET, DELAYED RELEASE ORAL DAILY
Qty: 30 TABLET | Refills: 3 | Status: SHIPPED | OUTPATIENT
Start: 2024-06-10 | End: 2025-06-10

## 2024-06-10 RX ORDER — ALBUTEROL SULFATE 90 UG/1
2 AEROSOL, METERED RESPIRATORY (INHALATION) EVERY 6 HOURS PRN
Qty: 18 G | Refills: 0 | Status: SHIPPED | OUTPATIENT
Start: 2024-06-10 | End: 2025-06-10

## 2024-06-10 RX ORDER — TESTOSTERONE CYPIONATE 200 MG/ML
200 INJECTION, SOLUTION INTRAMUSCULAR
COMMUNITY

## 2024-06-10 RX ORDER — VALSARTAN 40 MG/1
40 TABLET ORAL DAILY
Qty: 90 TABLET | Refills: 3 | Status: SHIPPED | OUTPATIENT
Start: 2024-06-10 | End: 2025-06-10

## 2024-06-10 NOTE — PROGRESS NOTES
"Subjective:       Patient ID: Omid Falcon is a 58 y.o. male.    Chief Complaint: Follow-up    Here today to follow up on chronic medical conditions.     HTN:  he was on lisinopril.  At his last visit we changed it to Diovan due to a chronic cough.  He has done well on the medication and his BP is controlled, but he still has the cough.      Cough:  9+ months.  Had CT chest in Feb which was unremarkable.  ACE-I changed to ARB.      He has some triggering to fingers on B hands.      Review of Systems   Constitutional:  Negative for appetite change, fatigue and fever.   Respiratory:  Positive for cough. Negative for shortness of breath and wheezing.    Cardiovascular:  Negative for chest pain and palpitations.   Gastrointestinal:  Negative for abdominal pain, constipation, diarrhea, nausea and vomiting.   Genitourinary:  Negative for difficulty urinating, dysuria, frequency and hematuria.   Neurological:  Negative for dizziness, syncope, weakness and headaches.   Psychiatric/Behavioral:  Negative for agitation, behavioral problems and confusion.        Objective:      Vitals:    06/10/24 1415   BP: 118/86   BP Location: Right arm   Patient Position: Sitting   BP Method: Small (Manual)   Pulse: 83   SpO2: 98%   Weight: 108.8 kg (239 lb 13.8 oz)   Height: 5' 10" (1.778 m)      Physical Exam  Constitutional:       General: He is not in acute distress.  Cardiovascular:      Rate and Rhythm: Normal rate and regular rhythm.      Heart sounds: Normal heart sounds. No murmur heard.  Pulmonary:      Effort: Pulmonary effort is normal. No respiratory distress.      Breath sounds: Normal breath sounds. No wheezing, rhonchi or rales.   Abdominal:      General: Abdomen is flat. There is no distension.      Palpations: Abdomen is soft. There is no mass.      Tenderness: There is no abdominal tenderness. There is no guarding.   Skin:     General: Skin is warm and dry.   Neurological:      General: No focal deficit present.      " Mental Status: He is alert.   Psychiatric:         Mood and Affect: Mood normal.         Behavior: Behavior normal.         Thought Content: Thought content normal.         Results for orders placed or performed in visit on 03/04/24   CULTURE, AEROBIC  (SPECIFY SOURCE)    Specimen: Back   Result Value Ref Range    CULTURE, AEROBIC AND ANAEROBIC        Assessment:       1. Benign essential HTN    2. Cough, unspecified type    3. Trigger finger, unspecified finger, unspecified laterality        Plan:       Benign essential HTN  -     valsartan (DIOVAN) 40 MG tablet; Take 1 tablet (40 mg total) by mouth once daily.  Dispense: 90 tablet; Refill: 3  BP well controlled  Cough, unspecified type  -     pantoprazole (PROTONIX) 40 MG tablet; Take 1 tablet (40 mg total) by mouth once daily.  Dispense: 30 tablet; Refill: 3  -     albuterol (VENTOLIN HFA) 90 mcg/actuation inhaler; Inhale 2 puffs into the lungs every 6 (six) hours as needed for Wheezing. Rescue  Dispense: 18 g; Refill: 0  No improvement in change from ACE.  Start PPI in case of LRD.  Albuterol PRN  Trigger finger, unspecified finger, unspecified laterality  -     Ambulatory referral/consult to Orthopedics; Future; Expected date: 06/17/2024          Medication List with Changes/Refills   New Medications    ALBUTEROL (VENTOLIN HFA) 90 MCG/ACTUATION INHALER    Inhale 2 puffs into the lungs every 6 (six) hours as needed for Wheezing. Rescue    PANTOPRAZOLE (PROTONIX) 40 MG TABLET    Take 1 tablet (40 mg total) by mouth once daily.   Current Medications    ALLOPURINOL (ZYLOPRIM) 300 MG TABLET    Take 1 tablet (300 mg total) by mouth once daily.    BLOOD SUGAR DIAGNOSTIC STRP    1 strip by Other route.    CHOLESTYRAMINE (QUESTRAN) 4 GRAM PACKET    TAKE 1\2 PACKET BY MOUTH THREE TIMES DAILY WITH MEALS    COLCHICINE, GOUT, (COLCRYS) 0.6 MG TABLET    1.2 mg PO x 1, then 0.6 mg PO 1 hour later x 1 (do not repeat again for 3 days)    DOCOSAHEXAENOIC ACID ORAL    Take 1 g by  mouth 2 (two) times daily.    TESTOSTERONE CYPIONATE (DEPOTESTOTERONE CYPIONATE) 200 MG/ML INJECTION    Inject 200 mg into the muscle every 14 (fourteen) days.   Changed and/or Refilled Medications    Modified Medication Previous Medication    VALSARTAN (DIOVAN) 40 MG TABLET valsartan (DIOVAN) 40 MG tablet       Take 1 tablet (40 mg total) by mouth once daily.    Take 1 tablet (40 mg total) by mouth once daily.   Discontinued Medications    B COMPLEX VITAMINS CAPSULE    Take 1 capsule by mouth once daily.    MULTIVITAMIN CAPSULE    Take 1 capsule by mouth once daily.

## 2024-07-12 ENCOUNTER — OFFICE VISIT (OUTPATIENT)
Dept: ORTHOPEDICS | Facility: CLINIC | Age: 59
End: 2024-07-12
Payer: COMMERCIAL

## 2024-07-12 VITALS — BODY MASS INDEX: 34.34 KG/M2 | HEIGHT: 70 IN | WEIGHT: 239.88 LBS

## 2024-07-12 DIAGNOSIS — M65.351 TRIGGER FINGER, RIGHT LITTLE FINGER: ICD-10-CM

## 2024-07-12 DIAGNOSIS — M65.312 TRIGGER FINGER OF LEFT THUMB: Primary | ICD-10-CM

## 2024-07-12 DIAGNOSIS — M65.30 TRIGGER FINGER, UNSPECIFIED FINGER, UNSPECIFIED LATERALITY: ICD-10-CM

## 2024-07-12 PROCEDURE — 99999 PR PBB SHADOW E&M-EST. PATIENT-LVL III: CPT | Mod: PBBFAC,,, | Performed by: ORTHOPAEDIC SURGERY

## 2024-07-12 RX ORDER — TRIAMCINOLONE ACETONIDE 40 MG/ML
40 INJECTION, SUSPENSION INTRA-ARTICULAR; INTRAMUSCULAR
Status: DISCONTINUED | OUTPATIENT
Start: 2024-07-12 | End: 2024-07-12 | Stop reason: HOSPADM

## 2024-07-12 RX ADMIN — TRIAMCINOLONE ACETONIDE 40 MG: 40 INJECTION, SUSPENSION INTRA-ARTICULAR; INTRAMUSCULAR at 10:07

## 2024-07-12 NOTE — PROCEDURES
Tendon Sheath    Date/Time: 7/12/2024 10:20 AM    Performed by: Ricardo Duffy MD  Authorized by: Ricardo Duffy MD    Consent Done?:  Yes (Verbal)  Indications:  Pain  Site marked: the procedure site was marked    Timeout: prior to procedure the correct patient, procedure, and site was verified    Prep: patient was prepped and draped in usual sterile fashion      Local anesthetic:  Lidocaine 1% without epinephrine  Anesthetic total (ml):  0.5    Location:  Small finger  Site:  R small flexor tendon sheath  Needle size:  25 G  Medications:  40 mg triamcinolone acetonide 40 mg/mL (20mg injected)  Patient tolerance:  Patient tolerated the procedure well with no immediate complications

## 2024-07-12 NOTE — PROCEDURES
For her reviewed today do I would believe he    Date/Time: 7/12/2024 10:20 AM    Performed by: Ricardo Duffy MD  Authorized by: Ricardo Duffy MD    Consent Done?:  Yes (Verbal)  Indications:  Pain  Site marked: the procedure site was marked    Timeout: prior to procedure the correct patient, procedure, and site was verified    Prep: patient was prepped and draped in usual sterile fashion      Local anesthetic:  Lidocaine 1% without epinephrine  Anesthetic total (ml):  0.5    Location:  Thumb  Site:  L thumb flexor tendon sheath  Needle size:  25 G  Medications:  40 mg triamcinolone acetonide 40 mg/mL (20mg injected)  Patient tolerance:  Patient tolerated the procedure well with no immediate complications

## 2024-07-12 NOTE — PROGRESS NOTES
Mr Falcon returns to clinic today.  Has a history of multiple trigger fingers.  He was also had carpal tunnel syndrome.  He did have a right thumb trigger release and carpal tunnel release.  He was complaining today about left thumb triggering and right middle finger triggering     Physical exam: Examination of bilateral hands reveals that there is no edema.  There are no major skin changes.  Palpation does produce tenderness over the A1 pulley of the left thumb as well as over the right small finger.  He does have active triggering upon flexion and extension of both of those fingers.  He was neurovascularly intact distally     Assessment:  Left thumb and right small finger triggering     Plan:     1.  After consent was obtained injection was placed to the left thumb flexor tendon sheath and the right small finger flexor tendon sheath.  The patient tolerated this well     2.  He will follow up as needed

## 2024-07-19 ENCOUNTER — OFFICE VISIT (OUTPATIENT)
Dept: PODIATRY | Facility: CLINIC | Age: 59
End: 2024-07-19
Payer: COMMERCIAL

## 2024-07-19 VITALS — BODY MASS INDEX: 34.34 KG/M2 | WEIGHT: 239.88 LBS | HEIGHT: 70 IN

## 2024-07-19 DIAGNOSIS — M72.2 PLANTAR FASCIITIS: Primary | ICD-10-CM

## 2024-07-19 DIAGNOSIS — M62.462 GASTROCNEMIUS EQUINUS OF LEFT LOWER EXTREMITY: ICD-10-CM

## 2024-07-19 PROCEDURE — 20550 NJX 1 TENDON SHEATH/LIGAMENT: CPT | Mod: LT,S$GLB,, | Performed by: PODIATRIST

## 2024-07-19 PROCEDURE — 99999 PR PBB SHADOW E&M-EST. PATIENT-LVL III: CPT | Mod: PBBFAC,,, | Performed by: PODIATRIST

## 2024-07-19 PROCEDURE — 99499 UNLISTED E&M SERVICE: CPT | Mod: S$GLB,,, | Performed by: PODIATRIST

## 2024-07-19 RX ORDER — TRIAMCINOLONE ACETONIDE 40 MG/ML
20 INJECTION, SUSPENSION INTRA-ARTICULAR; INTRAMUSCULAR ONCE
Status: COMPLETED | OUTPATIENT
Start: 2024-07-19 | End: 2024-07-19

## 2024-07-19 RX ORDER — DEXAMETHASONE SODIUM PHOSPHATE 4 MG/ML
2 INJECTION, SOLUTION INTRA-ARTICULAR; INTRALESIONAL; INTRAMUSCULAR; INTRAVENOUS; SOFT TISSUE
Status: COMPLETED | OUTPATIENT
Start: 2024-07-19 | End: 2024-07-19

## 2024-07-19 RX ORDER — LIDOCAINE HYDROCHLORIDE 10 MG/ML
1 INJECTION INFILTRATION; PERINEURAL
Status: COMPLETED | OUTPATIENT
Start: 2024-07-19 | End: 2024-07-19

## 2024-07-19 RX ADMIN — LIDOCAINE HYDROCHLORIDE 1 ML: 10 INJECTION INFILTRATION; PERINEURAL at 08:07

## 2024-07-19 RX ADMIN — TRIAMCINOLONE ACETONIDE 20 MG: 40 INJECTION, SUSPENSION INTRA-ARTICULAR; INTRAMUSCULAR at 08:07

## 2024-07-19 RX ADMIN — DEXAMETHASONE SODIUM PHOSPHATE 2 MG: 4 INJECTION, SOLUTION INTRA-ARTICULAR; INTRALESIONAL; INTRAMUSCULAR; INTRAVENOUS; SOFT TISSUE at 08:07

## 2024-07-19 NOTE — PROGRESS NOTES
Subjective:     Patient ID: Omid Falcon is a 58 y.o. male.    Chief Complaint: Toe Pain  Patient presents to clinic for a follow up regarding Lt. Heel pain.  States pain symptoms have improved with taking PT, however, still is performing maintenance to keep this at bay.  Rates pain currently as a 0/10 while at rest.  He continues wearing supportive shoe gear, is stretching, and massaging the site with a ball.  Inquires as to whether another injection would be beneficial.  Denies any additional pedal complaints.      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    PONV (postoperative nausea and vomiting)        Past Surgical History:   Procedure Laterality Date    ABDOMINAL HERNIA REPAIR Bilateral 06/18/2018    Procedure: REPAIR-HERNIA-INCISIONAL-INITIAL with mesh;  Surgeon: NEEL Gould MD;  Location: [facility] OR;  Service: General;  Laterality: Bilateral;    BACK SURGERY      CHOLECYSTECTOMY N/A 06/18/2018    Procedure: CHOLECYSTECTOMY;  Surgeon: NEEL Gould MD;  Location: [facility] OR;  Service: General;  Laterality: N/A;    COLECTOMY, RIGHT Right 02/02/2016    [facility], [place]    COLONOSCOPY N/A 07/24/2017    repeat in 2-3 yrs    EXPLORATORY LAPAROTOMY N/A 06/18/2018    Procedure: EXPLORATORY-LAPAROTOMY;  Surgeon: NEEL Gould MD;  Location: [facility] OR;  Service: General;  Laterality: N/A;    KNEE SURGERY      LIVER RESECTION N/A 06/18/2018    Procedure: RESECTION-HEPATIC;  Surgeon: NEEL Gould MD;  Location: [facility] OR;  Service: General;  Laterality: N/A;    ROBOTIC ARTHROPLASTY, KNEE Right 05/16/2023    Procedure: ROBOTIC ARTHROPLASTY, KNEE, TOTAL-ROSEMARIE;  Surgeon: Shamar Yanes MD;  Location: [facility] OR;  Service: Orthopedics;  Laterality: Right;    SMALL INTESTINE SURGERY N/A  02/02/2016    distal small bowel resection       Family History   Problem Relation Name Age of Onset    Cancer Mother      Diabetes Father      Diabetes Brother      Retinal detachment Neg Hx      Glaucoma Neg Hx      Macular degeneration Neg Hx         Social History     Socioeconomic History    Marital status:    Tobacco Use    Smoking status: Never    Smokeless tobacco: Former     Types: Chew    Tobacco comments:     chews once a month   Substance and Sexual Activity    Alcohol use: Yes     Comment: occas    Sexual activity: Yes     Partners: Female     Social Determinants of Health     Financial Resource Strain: Patient Declined (12/21/2023)    Overall Financial Resource Strain (CARDIA)     Difficulty of Paying Living Expenses: Patient declined   Food Insecurity: Patient Declined (12/21/2023)    Hunger Vital Sign     Worried About Running Out of Food in the Last Year: Patient declined     Ran Out of Food in the Last Year: Patient declined   Transportation Needs: Patient Declined (12/21/2023)    PRAPARE - Transportation     Lack of Transportation (Medical): Patient declined     Lack of Transportation (Non-Medical): Patient declined   Physical Activity: Unknown (12/21/2023)    Exercise Vital Sign     Days of Exercise per Week: 0 days   Stress: No Stress Concern Present (12/21/2023)    [employer] [place] [employer] - Occupational Stress Questionnaire     Feeling of Stress : Not at all   Housing Stability: Patient Declined (12/21/2023)    Housing Stability Vital Sign     Unable to Pay for Housing in the Last Year: Patient declined     Number of Places Lived in the Last Year: 1     Unstable Housing in the Last Year: Patient declined       Current Outpatient Medications   Medication Sig Dispense Refill    albuterol (VENTOLIN HFA) 90 mcg/actuation inhaler Inhale 2 puffs into the lungs every 6 (six) hours as needed for Wheezing. Rescue 18 g 0    allopurinoL (ZYLOPRIM) 300 MG tablet Take 1 tablet (300  mg total) by mouth once daily. 90 tablet 3    blood sugar diagnostic Strp 1 strip by Other route.      cholestyramine (QUESTRAN) 4 gram packet TAKE 1\2 PACKET BY MOUTH THREE TIMES DAILY WITH MEALS 60 packet 11    colchicine, gout, (COLCRYS) 0.6 mg tablet 1.2 mg PO x 1, then 0.6 mg PO 1 hour later x 1 (do not repeat again for 3 days) 30 tablet 0    DOCOSAHEXAENOIC ACID ORAL Take 1 g by mouth 2 (two) times daily.      pantoprazole (PROTONIX) 40 MG tablet Take 1 tablet (40 mg total) by mouth once daily. 30 tablet 3    testosterone cypionate (DEPOTESTOTERONE CYPIONATE) 200 mg/mL injection Inject 200 mg into the muscle every 14 (fourteen) days.      valsartan (DIOVAN) 40 MG tablet Take 1 tablet (40 mg total) by mouth once daily. 90 tablet 3     No current facility-administered medications for this visit.       Review of patient's allergies indicates:   Allergen Reactions    Ceftriaxone Hives, Itching and Other (See Comments)     Coughing and rhinitis  Flushing feeling  Coughing and rhinitis  Other reaction(s): Other (See Comments)  Coughing and rhinitis  Flushing feeling    Epinephrine Other (See Comments)     With chemo meds      Will cause cardiac arrest-per patient    Dermabond [tissue glues] Hives    Adhesive Hives, Other (See Comments) and Rash    Haptens - plastic and glue series Rash     Durabond        Hemoglobin A1C   Date Value Ref Range Status   07/11/2023 5.8 (H) 4.0 - 5.6 % Final     Comment:     ADA Screening Guidelines:  5.7-6.4%  Consistent with prediabetes  >or=6.5%  Consistent with diabetes    High levels of fetal hemoglobin interfere with the HbA1C  assay. Heterozygous hemoglobin variants (HbS, HgC, etc)do  not significantly interfere with this assay.   However, presence of multiple variants may affect accuracy.     09/09/2022 5.7 (H) 4.0 - 5.6 % Final     Comment:     ADA Screening Guidelines:  5.7-6.4%  Consistent with prediabetes  >or=6.5%  Consistent with diabetes    High levels of fetal hemoglobin  interfere with the HbA1C  assay. Heterozygous hemoglobin variants (HbS, HgC, etc)do  not significantly interfere with this assay.   However, presence of multiple variants may affect accuracy.     12/21/2021 5.8 (H) 4.0 - 5.6 % Final     Comment:     ADA Screening Guidelines:  5.7-6.4%  Consistent with prediabetes  >or=6.5%  Consistent with diabetes    High levels of fetal hemoglobin interfere with the HbA1C  assay. Heterozygous hemoglobin variants (HbS, HgC, etc)do  not significantly interfere with this assay.   However, presence of multiple variants may affect accuracy.         Review of Systems   Constitutional: Negative for chills and fever.   Cardiovascular:  Negative for claudication and leg swelling.   Skin:  Negative for color change and nail changes.   Musculoskeletal:  Positive for myalgias. Negative for joint pain, joint swelling, muscle cramps and muscle weakness.   Gastrointestinal:  Negative for nausea and vomiting.   Neurological:  Negative for numbness and paresthesias.   Psychiatric/Behavioral:  Negative for altered mental status.         Objective:     Physical Exam  Constitutional:       Appearance: Normal appearance. He is not ill-appearing.   Cardiovascular:      Pulses:           Dorsalis pedis pulses are 2+ on the right side and 2+ on the left side.        Posterior tibial pulses are 2+ on the right side and 2+ on the left side.      Comments: CFT is < 3 seconds bilateral.  Pedal hair growth is present bilateral.  No lower extremity edema noted bilateral.  Toes are warm to touch bilateral.    Musculoskeletal:         General: Tenderness present. No signs of injury.      Right lower leg: No edema.      Left lower leg: No edema.      Comments: Muscle strength 5/5 in all muscle groups bilateral.  No tenderness nor crepitation with ROM of foot/ankle joints bilateral.  Pain with palpation to the Lt. Medial calcaneal tubercle and plantar central heel.  Lt. Sided gastrocnemius equinus with noticeable  improvement in dorsiflexion.     Skin:     General: Skin is warm and dry.      Capillary Refill: Capillary refill takes 2 to 3 seconds.      Findings: No bruising, ecchymosis, erythema, signs of injury, laceration, lesion, petechiae, rash or wound.      Comments: Pedal skin has normal turgor, temperature, and texture bilateral.  Toenails x 10 appear normotrophic. Examination of the skin reveals no evidence of significant maceration, rashes, open lesions, suspicious appearing nevi or other concerning lesions.       Neurological:      General: No focal deficit present.      Mental Status: He is alert.      Sensory: No sensory deficit.      Motor: No weakness or atrophy.      Comments: Light touch is intact bilateral.             Assessment:      Encounter Diagnoses   Name Primary?    Plantar fasciitis Yes    Gastrocnemius equinus of left lower extremity      Plan:     Omid was seen today for toe pain.    Diagnoses and all orders for this visit:    Plantar fasciitis  -     LIDOcaine HCL 10 mg/ml (1%) injection 1 mL  -     dexAMETHasone injection 2 mg  -     triamcinolone acetonide injection 20 mg    Gastrocnemius equinus of left lower extremity      I counseled the patient on his conditions, their implications and medical management.    - After sterilizing the area with an alcohol prep and applying ethyl chloride, the affected area of the Lt. Medial heel was injected as per MAR.  The patient tolerated the injection well, with minimal blood loss noted from the injection site.  The injection site was then covered with a bandage.  Patient tolerated this quite well.         - To continue performing stretching exercises to address bilateral equinus.     - Recommend wearing supportive shoes only.  Discussed avoidance of barefoot walking, flip flops, and Crocs, as this will exacerbate current symptoms.       - Discussed avoidance of high impact activities such as squatting, stooping, and running as these activities will  exacerbate symptoms.       - May consider applying a topical analgesic (Voltaren) to help with pain symptoms.       - RTC in 3-6 months for a repeat injection if warranted.     Mir Sarah DPM

## 2024-09-07 DIAGNOSIS — R05.9 COUGH, UNSPECIFIED TYPE: ICD-10-CM

## 2024-09-07 NOTE — TELEPHONE ENCOUNTER
Care Due:                  Date            Visit Type   Department     Provider  --------------------------------------------------------------------------------                                EP -                              [facility] FAM[facility]  Last Visit: 06-      CARE ([facility])   ADDISON Lizama                               -                              [facility]  Next Visit: 09-      CARE ([facility])   ADDISON Lizama                                                            Last  Test          Frequency    Reason                     Performed    Due Date  --------------------------------------------------------------------------------    CMP.........  12 months..  colchicine,, valsartan...  05- 05-    Uric Acid...  12 months..  colchicine,..............  Not Found    Overdue    [employer] Embedded Care Due Messages. Reference number: [id number].   9/07/2024 8:12:12 AM CDT

## 2024-09-08 RX ORDER — PANTOPRAZOLE SODIUM 40 MG/1
40 TABLET, DELAYED RELEASE ORAL
Qty: 90 TABLET | Refills: 3 | Status: SHIPPED | OUTPATIENT
Start: 2024-09-08

## 2024-09-08 NOTE — TELEPHONE ENCOUNTER
Provider Staff:  Action required for this patient    Requires labs      Please see care gap opportunities below in Care Due Message.    Thanks!  [facility]     Appointments      Date Provider   Last Visit   6/10/2024 Josh Lizama MD   Next Visit   9/13/2024 Josh Lizama MD     Refill Decision Note   Omid Falcon  is requesting a refill authorization.  Brief Assessment and Rationale for Refill:  Approve     Medication Therapy Plan:         Comments:     Note composed:5:53 PM 09/08/2024

## 2024-09-13 ENCOUNTER — OFFICE VISIT (OUTPATIENT)
Dept: FAMILY MEDICINE | Facility: CLINIC | Age: 59
End: 2024-09-13
Payer: COMMERCIAL

## 2024-09-13 ENCOUNTER — PATIENT OUTREACH (OUTPATIENT)
Dept: ADMINISTRATIVE | Facility: HOSPITAL | Age: 59
End: 2024-09-13
Payer: COMMERCIAL

## 2024-09-13 VITALS
SYSTOLIC BLOOD PRESSURE: 116 MMHG | OXYGEN SATURATION: 98 % | HEIGHT: 70 IN | TEMPERATURE: 98 F | WEIGHT: 231.5 LBS | BODY MASS INDEX: 33.14 KG/M2 | HEART RATE: 84 BPM | DIASTOLIC BLOOD PRESSURE: 78 MMHG

## 2024-09-13 DIAGNOSIS — I10 BENIGN ESSENTIAL HTN: Primary | ICD-10-CM

## 2024-09-13 DIAGNOSIS — C7A.012 MALIGNANT CARCINOID TUMOR OF THE ILEUM: ICD-10-CM

## 2024-09-13 DIAGNOSIS — R73.03 PREDIABETES: ICD-10-CM

## 2024-09-13 DIAGNOSIS — E29.1 MALE HYPOGONADISM: ICD-10-CM

## 2024-09-13 DIAGNOSIS — M10.9 GOUT, UNSPECIFIED CAUSE, UNSPECIFIED CHRONICITY, UNSPECIFIED SITE: ICD-10-CM

## 2024-09-13 PROCEDURE — 99999 PR PBB SHADOW E&M-EST. PATIENT-LVL IV: CPT | Mod: PBBFAC,,, | Performed by: FAMILY MEDICINE

## 2024-09-13 RX ORDER — ANASTROZOLE 1 MG/1
1 TABLET ORAL DAILY
COMMUNITY

## 2024-09-13 NOTE — PROGRESS NOTES
"Subjective:       Patient ID: Omdi Falcon is a 58 y.o. male.    Chief Complaint: 3 month f/u    Here today to follow up on chronic medical conditions.   He states his cough is better.  He is taking the Protonix daily.    HTN:  he was on lisinopril.  We changed it to Diovan due to a chronic cough.  He has done well on the medication and his BP is controlled  Neuroendocrine Tumor of bowel.  Seeing Heme/Onc.  He has had 2 different surgeries.  Was on medication, currently off.  Getting care at [facility].  He has been seeing Dr. Nesbitt (GI) who has retired, he will reach out to their office.  Prediabetes:  Last HgA1c was 5.8 in July 2023  Gout: on daily Allopurinol.          Review of Systems   Constitutional:  Negative for appetite change, fatigue and fever.   HENT:  Negative for congestion, sneezing and sore throat.    Respiratory:  Negative for cough, shortness of breath and wheezing.    Cardiovascular:  Negative for chest pain and palpitations.   Gastrointestinal:  Negative for abdominal pain, constipation, diarrhea, nausea and vomiting.   Genitourinary:  Negative for difficulty urinating, dysuria, frequency and hematuria.   Neurological:  Negative for dizziness, syncope, weakness and headaches.   Psychiatric/Behavioral:  Negative for agitation, behavioral problems and confusion. The patient is not nervous/anxious.        Objective:      Vitals:    09/13/24 1000   BP: 116/78   Pulse: 84   Temp: 98 °F (36.7 °C)   SpO2: 98%   Weight: 105 kg (231 lb 7.7 oz)   Height: 5' 10" (1.778 m)      Physical Exam  Constitutional:       General: He is not in acute distress.  Cardiovascular:      Rate and Rhythm: Normal rate and regular rhythm.      Heart sounds: Normal heart sounds. No murmur heard.  Pulmonary:      Effort: Pulmonary effort is normal. No respiratory distress.      Breath sounds: Normal breath sounds. No wheezing, rhonchi or rales.   Musculoskeletal:         General: No swelling.   Skin:     General: Skin is warm " and dry.   Neurological:      General: No focal deficit present.      Mental Status: He is alert.   Psychiatric:         Mood and Affect: Mood normal.         Behavior: Behavior normal.         Thought Content: Thought content normal.         Results for orders placed or performed in visit on 03/04/24   CULTURE, AEROBIC  (SPECIFY SOURCE)    Specimen: Back   Result Value Ref Range    CULTURE, AEROBIC AND ANAEROBIC        Assessment:       1. Benign essential HTN    2. Malignant carcinoid tumor of the ileum    3. Male hypogonadism    4. Prediabetes    5. Gout, unspecified cause, unspecified chronicity, unspecified site        Plan:       Benign essential HTN  Continue current medications  Malignant carcinoid tumor of the ileum  Continue Mgt per GI  Male hypogonadism  Now on Testosterone therapy  Prediabetes  -     Hemoglobin A1C; Future; Expected date: 09/16/2024    Gout, unspecified cause, unspecified chronicity, unspecified site  -     Uric Acid; Future; Expected date: 09/16/2024    Recheck labs with next lab draw,.      Medication List with Changes/Refills   Current Medications    ALBUTEROL (VENTOLIN HFA) 90 MCG/ACTUATION INHALER    Inhale 2 puffs into the lungs every 6 (six) hours as needed for Wheezing. Rescue    ANASTROZOLE (ARIMIDEX) 1 MG TAB    Take 1 mg by mouth once daily.    BLOOD SUGAR DIAGNOSTIC STRP    1 strip by Other route.    CHOLESTYRAMINE (QUESTRAN) 4 GRAM PACKET    TAKE 1\2 PACKET BY MOUTH THREE TIMES DAILY WITH MEALS    COLCHICINE, GOUT, (COLCRYS) 0.6 MG TABLET    1.2 mg PO x 1, then 0.6 mg PO 1 hour later x 1 (do not repeat again for 3 days)    DOCOSAHEXAENOIC ACID ORAL    Take 1 g by mouth 2 (two) times daily.    PANTOPRAZOLE (PROTONIX) 40 MG TABLET    take 1 tablet by mouth once DAILY    SEMAGLUTIDE (OZEMPIC) 0.25 MG OR 0.5 MG (2 MG/3 ML) PEN INJECTOR    Inject 0.5 mg into the skin every 7 days.    TESTOSTERONE CYPIONATE (DEPOTESTOTERONE CYPIONATE) 200 MG/ML INJECTION    Inject 200 mg into the  muscle every 14 (fourteen) days.    VALSARTAN (DIOVAN) 40 MG TABLET    Take 1 tablet (40 mg total) by mouth once daily.

## 2024-09-13 NOTE — LETTER
AUTHORIZATION FOR RELEASE OF   CONFIDENTIAL INFORMATION    Dear LIS Nesbitt MD      We are seeing Omid Falcon, date of birth 1965, in the clinic at [facility]. Josh Lizama MD is the patient's PCP. Omid Falcon has an outstanding lab/procedure at the time we reviewed his chart. In order to help keep his health information updated, he has authorized us to request the following medical record(s):                    ( XX )  COLONOSCOPY+RECALL DATE          Please fax records to [facility], [place], Joseph E, MD, [phone]    Thank you,  Francisca Mora, Care Coordinator  [facility]  Phone: [phone]  Fax: [phone]           Patient Name: Omid Falcon  : 1965  Patient Phone #: [phone]            Omid Falcon  MRN: [id number]  : 1965  Age: 58 y.o.  Sex: male         Patient/Legal Guardian Signature  This signature was collected at 2023           _______________________________   Printed Name/Relationship to Patient      Consent for Examination and Treatment: I hereby authorize the providers and employees of [facility] ([facility]) to provide medical treatment/services which includes, but is not limited to, performing and administering tests and diagnostic procedures that are deemed necessary, including, but not limited to, imaging examinations, blood tests and other laboratory procedures as may be required by the hospital, clinic, or may be ordered by my physician(s) or persons working under the general and/or special instructions of my physician(s).      I understand and agree that this consent covers all authorized persons, including but not limited to physicians, residents, nurse practitioners, physicians' assistants, specialists, consultants, student nurses, and independently contracted physicians, who are called upon by the physician in charge, to carry out the diagnostic procedures and medical or surgical  treatment.     I hereby authorize [facility] to retain or dispose of any specimens or tissue, should there be such remaining from any test or procedure.     I hereby authorize and give consent for [facility] providers and employees to take photographs, images or videotapes of such diagnostic, surgical or treatment procedures of Patient as may be required by [facility] or as may be ordered by a physician. I further acknowledge and agree that [facility] may use cameras or other devices for patient monitoring.     I am aware that the practice of medicine is not an exact science, and I acknowledge that no guarantees have been made to me as to the outcome of any tests, procedures or treatment.     Authorization for Release of Information: I understand that my insurance company and/or their agents may need information necessary to make determinations about payment/reimbursement. I hereby provide authorization to release to all insurance companies, their successors, assignees, other parties with whom they may have contracted, or others acting on their behalf, that are involved with payment for any hospital and/or clinic charges incurred by the patient, any information that they request and deem necessary for payment/reimbursement, and/or quality review.  I further authorize the release of my health information to physicians or other health care practitioners on staff who are involved in my health care now and in the future, and to other health care providers, entities, or institutions for the purpose of my continued care and treatment, including referrals.     REGISTRATION AUTHORIZATION  Form No. 26397 (Rev. 7/13/2022)       Medicare Patient's Certification and Authorization to Release Information and Payment Request:  I certify that the information given by me in applying for payment under Title XVIII of the Social Security Act is correct. I authorize any montejo of medical or other information about me to release to the [facility]  [facility] [place], or its intermediaries or carriers, any information needed for this or a related Medicare claim. I request that payment of authorized benefits be made on my behalf.     Assignment of Insurance Benefits:   I hereby authorize any and all insurance companies, health plans, defined   benefit plans, health insurers or any entity that is or may be responsible for payment of my medical expenses to pay all hospital and medical benefits now due, and to become due and payable to me under any hospital benefits, sick benefits, injury benefits or any other benefit for services rendered to me, including Major Medical Benefits, direct to [facility] and all independently contracted physicians. I assign any and all rights that I may have against any and all insurance companies, health plans, defined benefit plans, health insurers or any entity that is or may be responsible for payment of my medical expenses, including, but not limited to any right to appeal a denial of a claim, any right to bring any action, lawsuit, administrative proceeding, or other cause of action on my behalf. I specifically assign my right to pursue litigation against any and all insurance companies, health plans, defined benefit plans, health insurers or any entity that is or may be responsible for payment of my medical expenses based upon a refusal to pay charges.            E. Valuables: It is understood and agreed that [facility] is not liable for the damage to or loss of any money, jewelry,   documents, dentures, eye glasses, hearing aids, prosthetics, or other property of value.     F. Computer Equipment: I understand and agree that should I choose to use computer equipment owned by [facility] or if I choose to access the Internet via [facility]s network, I do so at my own risk. [facility] is not responsible for any damage to my computer equipment or to any damages of any type that might arise from my loss of equipment or data.     G. Acceptance  of Financial Responsibility:  I agree that in consideration of the services and   supplies that have been   or will be furnished to the patient, I am hereby obligated to pay all charges made for or on the account of the patient according to the standard rates (in effect at the time the services and supplies are delivered) established by [facility], including its Patient Financial Assistance Policy to the extent it is applicable. I understand that I am responsible for all charges, or portions thereof, not covered by insurance or other sources. Patient refunds will be distributed only after balances at all [facility] facilities are paid.     H. Communication Authorization:  I hereby authorize [facility] and its representatives, along with any billing service   or  who may work on their behalf, to contact me on   my cell phone and/or home phone using pre- recorded messages, artificial voice messages, automatic telephone dialing devices or other computer assisted technology, or by electronic      mail, text messaging, or by any other form of electronic communication. This includes, but is not limited to, appointment reminders, yearly physical exam reminders, preventive care reminders, patient campaigns, welcome calls, and calls about account balances on my account or any account on which I am listed as a guarantor. I understand I have the right to opt out of these communications at any time.      Relationship  Between  Facility and  Provider:      I understand that some, but not all, providers furnishing services to the patient are not employees or agents of [facility]. The patient is under the care and supervision of his/her attending physician, and it is the responsibility of the facility and its nursing staff to carry out the instructions of such physicians. It is the responsibility of the patient's physician/designee to obtain the patient's informed consent, when required, for medical or surgical treatment,  special diagnostic or therapeutic procedures, or hospital services rendered for the patient under the special instructions of the physician/designee.     REGISTRATION AUTHORIZATION  Form No. 61368 (Rev. 7/13/2022)      Notice of Privacy Practices: I acknowledge I have received a copy of [facility]'s Notice of Privacy Practices.     Facility  Directory: I have discussed with the organization my desire to be either included or excluded  in the facility directory in the event of my being an inpatient at an [facility] facility. I understand that if my choice is to opt-out of being identified in the facility directory that the facility will not provide any information about me such as my condition (e.g. fair, stable, etc.) or my location in the facility (e.g., room number, department).     Immunizations: [facility] shares immunization information with state sponsored health departments to help you and your doctor keep track of your immunization records. By signing, you consent to have this information shared with the health department in your state:                                [place] - LINKS ([place] Immunization Network for Kids Statewide)                                [place] - MIIX ([place] Immunization Information eXchange)                                [place] - ImmPRINT (Immunization Patient Registry with Integrated Technology)     TERM: This authorization is valid for this and subsequent care/treatment I receive at [facility] and will remain valid unless/until revoked in writing by me.     [facility]: As used in this document, [facility] means all [facility] owned and managed facilities, including, but not limited to, all health centers, surgery centers, clinics, urgent care centers, and hospitals.         [facility] complies with applicable Federal civil rights laws and does not discriminate on the basis of race, color, national origin, age, disability, or sex.  ATENCIÓN: torri jones  español, tiene a woodruff disposición servicios gratuitos de asistencia lingüística. Calvin al [phone].  CHÚ Ý: N?u b?n nói Ti?ng Vi?t, có các d?ch v? h? tr? ngôn ng? mi?n phí dành cho b?n. G?i s? [phone].        REGISTRATION AUTHORIZATION  Form No. 30223 (Rev. 7/13/2022)

## 2024-09-13 NOTE — PROGRESS NOTES
Population Health Chart Review & Patient Outreach Details      Additional Pop Health Notes:               Updates Requested / Reviewed:      Updated Care Coordination Note         Health Maintenance Topics Overdue:      VB Score: 2     Colon Cancer Screening  Hemoglobin A1c                       Health Maintenance Topic(s) Outreach Outcomes & Actions Taken:    Colorectal Cancer Screening - Outreach Outcomes & Actions Taken  : External Records Requested & Care Team Updated if Applicable

## 2024-09-14 LAB
HBA1C MFR BLD: 6 % OF TOTAL HGB
URATE SERPL-MCNC: 3.5 MG/DL (ref 4–8)

## 2024-09-25 ENCOUNTER — PATIENT MESSAGE (OUTPATIENT)
Dept: ADMINISTRATIVE | Facility: HOSPITAL | Age: 59
End: 2024-09-25
Payer: COMMERCIAL

## 2024-09-27 ENCOUNTER — PATIENT OUTREACH (OUTPATIENT)
Dept: ADMINISTRATIVE | Facility: HOSPITAL | Age: 59
End: 2024-09-27
Payer: COMMERCIAL

## 2024-09-27 NOTE — PROGRESS NOTES
Population Health Chart Review & Patient Outreach Details      Additional [facility] Health Notes:               Updates Requested / Reviewed:      Updated Care Coordination Note, Care Everywhere, and Immunizations Reconciliation Completed or Queried: [place]         Health Maintenance Topics Overdue:      [facility] Score: 0     Patient is not due for any topics at this time.                       Health Maintenance Topic(s) Outreach Outcomes & Actions Taken:    Colorectal Cancer Screening - Outreach Outcomes & Actions Taken  : External Records Uploaded, Care Team Updated, & History Updated if Applicable

## 2024-10-01 DIAGNOSIS — M10.9 GOUT, UNSPECIFIED CAUSE, UNSPECIFIED CHRONICITY, UNSPECIFIED SITE: Primary | ICD-10-CM

## 2024-10-01 RX ORDER — ALLOPURINOL 300 MG/1
300 TABLET ORAL
Qty: 90 TABLET | Refills: 3 | Status: SHIPPED | OUTPATIENT
Start: 2024-10-01

## 2024-10-01 NOTE — TELEPHONE ENCOUNTER
No care due was identified.  [facility] Embedded Care Due Messages. Reference number: [id number].   10/01/2024 8:32:23 AM CDT

## 2024-10-01 NOTE — TELEPHONE ENCOUNTER
Refill Routing Note   Medication(s) are not appropriate for processing by [facility] for the following reason(s):        Required labs outdated  No active prescription written by provider    [facility] action(s):  Defer               Appointments  past 12m or future 3m with PCP    Date Provider   Last Visit   9/13/2024 Josh Lizama MD   Next Visit   3/14/2025 Josh Lizaam MD   ED visits in past 90 days: 0        Note composed:3:44 PM 10/01/2024

## 2024-10-03 DIAGNOSIS — M10.9 GOUT, UNSPECIFIED CAUSE, UNSPECIFIED CHRONICITY, UNSPECIFIED SITE: ICD-10-CM

## 2024-10-03 RX ORDER — ALLOPURINOL 300 MG/1
300 TABLET ORAL
Qty: 90 TABLET | Refills: 3 | OUTPATIENT
Start: 2024-10-03

## 2024-10-03 NOTE — TELEPHONE ENCOUNTER
Care Due:                  Date            Visit Type   Department     Provider  --------------------------------------------------------------------------------                                EP -                              PRIMARY      [facility]  Last Visit: 09-      CARE ([facility])   ADDISON Lizama                               -                              PRIMARY      [facility]  Next Visit: 03-      CARE ([facility])   ADDISON Lizama                                                            Last  Test          Frequency    Reason                     Performed    Due Date  --------------------------------------------------------------------------------    CBC.........  12 months..  allopurinoL..............  05- 05-    [facility] Embedded Care Due Messages. Reference number: [id number].   10/03/2024 5:37:06 AM CDT

## 2024-10-23 DIAGNOSIS — R05.9 COUGH, UNSPECIFIED TYPE: ICD-10-CM

## 2024-10-23 NOTE — TELEPHONE ENCOUNTER
No care due was identified.  [facility] Embedded Care Due Messages. Reference number: [id number].   10/23/2024 5:02:03 PM CDT

## 2024-10-24 RX ORDER — PANTOPRAZOLE SODIUM 40 MG/1
40 TABLET, DELAYED RELEASE ORAL DAILY
Qty: 90 TABLET | Refills: 3 | Status: SHIPPED | OUTPATIENT
Start: 2024-10-24

## 2024-10-24 NOTE — TELEPHONE ENCOUNTER
Refill Decision Note   Omid Falcon  is requesting a refill authorization.  Brief Assessment and Rationale for Refill:  Approve     Medication Therapy Plan:        Comments:     Note composed:7:41 AM 10/24/2024

## 2024-11-10 ENCOUNTER — PATIENT MESSAGE (OUTPATIENT)
Dept: FAMILY MEDICINE | Facility: CLINIC | Age: 59
End: 2024-11-10
Payer: COMMERCIAL

## 2024-11-11 ENCOUNTER — E-VISIT (OUTPATIENT)
Dept: FAMILY MEDICINE | Facility: CLINIC | Age: 59
End: 2024-11-11
Payer: COMMERCIAL

## 2024-11-11 DIAGNOSIS — R51.9 SINUS HEADACHE: ICD-10-CM

## 2024-11-11 DIAGNOSIS — J01.00 SUBACUTE MAXILLARY SINUSITIS: Primary | ICD-10-CM

## 2024-11-11 RX ORDER — NAPROXEN 500 MG/1
500 TABLET ORAL 2 TIMES DAILY PRN
Qty: 10 TABLET | Refills: 0 | Status: SHIPPED | OUTPATIENT
Start: 2024-11-11 | End: 2024-11-16

## 2024-11-11 RX ORDER — DOXYCYCLINE HYCLATE 100 MG
100 TABLET ORAL 2 TIMES DAILY
Qty: 14 TABLET | Refills: 0 | Status: SHIPPED | OUTPATIENT
Start: 2024-11-11 | End: 2024-11-18

## 2024-11-11 RX ORDER — OXYMETAZOLINE HCL 0.05 %
2 SPRAY, NON-AEROSOL (ML) NASAL 2 TIMES DAILY
Qty: 6 ML | Refills: 0 | Status: SHIPPED | OUTPATIENT
Start: 2024-11-11 | End: 2024-11-14

## 2024-11-11 NOTE — PROGRESS NOTES
Patient ID: Omid Falcon is a 58 y.o. male.    Chief Complaint: URI (Entered automatically based on patient selection in [employer].)          274}  The patient initiated a request through [employer] on 11/11/2024 for evaluation and management with a chief complaint of URI (Entered automatically based on patient selection in [employer].)     I evaluated the questionnaire submission on 11/11/2024 .    Total Time (in minutes): 12     Ohs Peq E-Visit Covid    11/11/2024 12:41 PM CST - Filed by Patient   Do you agree to participate in an E-Visit? Yes   If you have any of the following symptoms, go to your local emergency room or call 911: I acknowledge   Choose the state of your primary residence [place]   What is the main issue you would like addressed today? Sinus, congestion and pressure   Do you think you might have COVID or the Flu? No   Have you tested positive for COVID or Flu? No   What symptoms do you currently have?  Headache;  Nasal Congestion   Have you ever smoked? I have never smoked   Have you had a fever? No   When did your symptoms first appear? 11/8/2024   In the last two weeks, have you been in close contact with someone who has COVID-19 or the Flu? No   List what you have done or taken to help your symptoms. Over-the-counter medication, Mucinex, and [place] plus cold medicine   How severe are your symptoms? Moderate   Have your symptoms gotten better or worse since they started?  Worse   Do you have transportation to get testing if it is needed and ordered for you at an [facility] location? Yes   Provide any additional information you feel is important. No additional information needed   Please attach any relevant images or files    Are you able to take your vital signs? 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763 09/09/2022      Review of patient's allergies indicates:   Allergen Reactions    Ceftriaxone Hives, Itching and Other (See Comments)     Coughing and rhinitis  Flushing feeling  Coughing and rhinitis  Other reaction(s): Other (See Comments)  Coughing and rhinitis  Flushing feeling    Epinephrine Other (See Comments)     With chemo meds      Will cause cardiac arrest-per patient    Dermabond [tissue glues] Hives    Adhesive Hives, Other (See Comments) and Rash    Haptens - plastic and glue series Rash     Durabond       Encounter Diagnoses   Name Primary?    Subacute maxillary sinusitis Yes    Sinus headache         No orders of the defined types were placed in this encounter.     Medications Ordered This Encounter   Medications    doxycycline (VIBRA-TABS) 100 MG tablet     Sig: Take 1 tablet (100 mg total) by mouth 2 (two) times daily. for 7 days     Dispense:  14 tablet     Refill:  0    naproxen (NAPROSYN) 500 MG tablet     Sig: Take 1 tablet (500 mg total) by mouth 2 (two) times daily as needed (pain).     Dispense:   10 tablet     Refill:  0    oxymetazoline (AFRIN, OXYMETAZOLINE,) 0.05 % nasal spray     Si sprays by Nasal route 2 (two) times daily. Do not take over 3 days due to rebound congestion for 3 days     Dispense:  6 mL     Refill:  0        E-Visit Time Tracking:    Day 1 Time (in minutes): 12    Total Time (in minutes): 12      274}

## 2025-01-06 ENCOUNTER — OFFICE VISIT (OUTPATIENT)
Dept: FAMILY MEDICINE | Facility: CLINIC | Age: 60
End: 2025-01-06
Payer: COMMERCIAL

## 2025-01-06 VITALS
DIASTOLIC BLOOD PRESSURE: 84 MMHG | BODY MASS INDEX: 32.23 KG/M2 | HEIGHT: 71 IN | OXYGEN SATURATION: 98 % | HEART RATE: 80 BPM | SYSTOLIC BLOOD PRESSURE: 132 MMHG | WEIGHT: 230.19 LBS

## 2025-01-06 DIAGNOSIS — I82.401 RECURRENT ACUTE DEEP VEIN THROMBOSIS (DVT) OF RIGHT LOWER EXTREMITY: Primary | ICD-10-CM

## 2025-01-06 DIAGNOSIS — C7A.012 MALIGNANT CARCINOID TUMOR OF THE ILEUM: ICD-10-CM

## 2025-01-06 DIAGNOSIS — C7B.8 SECONDARY MALIGNANT NEUROENDOCRINE TUMOR OF LIVER: ICD-10-CM

## 2025-01-06 PROCEDURE — 3079F DIAST BP 80-89 MM HG: CPT | Mod: CPTII,S$GLB,, | Performed by: FAMILY MEDICINE

## 2025-01-06 PROCEDURE — 1160F RVW MEDS BY RX/DR IN RCRD: CPT | Mod: CPTII,S$GLB,, | Performed by: FAMILY MEDICINE

## 2025-01-06 PROCEDURE — 99999 PR PBB SHADOW E&M-EST. PATIENT-LVL IV: CPT | Mod: PBBFAC,,, | Performed by: FAMILY MEDICINE

## 2025-01-06 PROCEDURE — 1159F MED LIST DOCD IN RCRD: CPT | Mod: CPTII,S$GLB,, | Performed by: FAMILY MEDICINE

## 2025-01-06 PROCEDURE — 3008F BODY MASS INDEX DOCD: CPT | Mod: CPTII,S$GLB,, | Performed by: FAMILY MEDICINE

## 2025-01-06 PROCEDURE — 99214 OFFICE O/P EST MOD 30 MIN: CPT | Mod: S$GLB,,, | Performed by: FAMILY MEDICINE

## 2025-01-06 PROCEDURE — 3075F SYST BP GE 130 - 139MM HG: CPT | Mod: CPTII,S$GLB,, | Performed by: FAMILY MEDICINE

## 2025-01-06 NOTE — PROGRESS NOTES
"Subjective:       Patient ID: Omid Falcon is a 59 y.o. male.    Chief Complaint: hospital f/u    Here today to follow up on a recent ER visit.  He was at home and developed soreness and tenderness to his calf.  Some swelling in his leg.  He felt like it was similar to his previous DVT.  Was seen at [facility] on 12/26 and dx with a DVT.  Of note, he had also developed DVT per US in 6/2/2023 in [facility].  Started on Eliquis.  Saw Ortho and had repeat US 6/27/23 which was normal.  Eliquis was stopped and he took ASA for about a month.  He was off ASA when he developed this DVT.      He denies trauma or prolonged travel.      Review of Systems   Constitutional:  Negative for activity change and unexpected weight change.   HENT:  Negative for hearing loss, rhinorrhea and trouble swallowing.    Eyes:  Negative for discharge and visual disturbance.   Respiratory:  Negative for chest tightness and wheezing.    Cardiovascular:  Negative for chest pain and palpitations.   Gastrointestinal:  Negative for blood in stool, constipation, diarrhea and vomiting.   Endocrine: Negative for polydipsia and polyuria.   Genitourinary:  Negative for difficulty urinating, hematuria and urgency.   Musculoskeletal:  Positive for arthralgias and neck pain. Negative for joint swelling.   Neurological:  Negative for weakness and headaches.   Psychiatric/Behavioral:  Negative for confusion and dysphoric mood.        Objective:      Vitals:    01/06/25 1604   BP: 132/84   Pulse: 80   SpO2: 98%   Weight: 104.4 kg (230 lb 2.6 oz)   Height: 5' 10.5" (1.791 m)      Physical Exam  Constitutional:       General: He is not in acute distress.  Cardiovascular:      Rate and Rhythm: Normal rate and regular rhythm.      Heart sounds: Normal heart sounds. No murmur heard.  Pulmonary:      Effort: Pulmonary effort is normal. No respiratory distress.      Breath sounds: Normal breath sounds. No wheezing, rhonchi or rales.   Musculoskeletal:         General: " No swelling.      Right lower leg: No edema.      Left lower leg: No edema.   Skin:     General: Skin is warm and dry.   Neurological:      General: No focal deficit present.      Mental Status: He is alert.   Psychiatric:         Mood and Affect: Mood normal.         Behavior: Behavior normal.         Thought Content: Thought content normal.         Results for orders placed or performed in visit on 09/27/24    COLONOSCOPY    Collection Time: 06/10/24 12:00 AM   Result Value Ref Range    CRC Recommendation External Repeat colonoscopy in 5 years       Assessment:       1. 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 Malignant carcinoid tumor of the ileum    3. Secondary malignant neuroendocrine tumor of liver        Plan:       Recurrent acute deep vein thrombosis (DVT) of right lower extremity  -     apixaban (ELIQUIS) 5 mg Tab; Take 1 tablet (5 mg total) by mouth 2 (two) times daily.  Dispense: 180 tablet; Refill: 3    Malignant carcinoid tumor of the ileum    Secondary malignant neuroendocrine tumor of liver    Due to his previous DVT and history of Malignancy, I recommended that he stay on anticoagulation for life and refilled his Eliquis.  I recommended that he stop his testosterone.    We also discussed him discussing with his Heme/Onc.      Medication List with Changes/Refills   Current Medications    ALBUTEROL (VENTOLIN HFA) 90 MCG/ACTUATION INHALER    Inhale 2 puffs into the lungs every 6 (six) hours as needed for Wheezing. Rescue    ALLOPURINOL (ZYLOPRIM) 300 MG TABLET    TAKE ONE TABLET BY MOUTH ONCE DAILY    BLOOD SUGAR DIAGNOSTIC STRP    1 strip by Other route.    CHOLESTYRAMINE (QUESTRAN) 4 GRAM PACKET    TAKE 1\2 PACKET BY MOUTH THREE TIMES DAILY WITH MEALS    COLCHICINE, GOUT, (COLCRYS) 0.6 MG TABLET    1.2 mg PO x 1, then 0.6 mg PO 1 hour later x 1 (do not repeat again for 3 days)    DOCOSAHEXAENOIC ACID ORAL    Take 1 g by mouth 2 (two) times daily.    PANTOPRAZOLE  (PROTONIX) 40 MG TABLET    Take 1 tablet (40 mg total) by mouth once daily.    SEMAGLUTIDE (OZEMPIC) 0.25 MG OR 0.5 MG (2 MG/3 ML) PEN INJECTOR    Inject 0.5 mg into the skin every 7 days.    VALSARTAN (DIOVAN) 40 MG TABLET    Take 1 tablet (40 mg total) by mouth once daily.   Changed and/or Refilled Medications    Modified Medication Previous Medication    APIXABAN (ELIQUIS) 5 MG TAB apixaban (ELIQUIS) 5 mg Tab       Take 1 tablet (5 mg total) by mouth 2 (two) times daily.    Take 10 mg by mouth twice daily for 7 days then 5 mg twice a day.   Discontinued Medications    ANASTROZOLE (ARIMIDEX) 1 MG TAB    Take 1 mg by mouth once daily.    TESTOSTERONE CYPIONATE (DEPOTESTOTERONE CYPIONATE) 200 MG/ML INJECTION    Inject 200 mg into the muscle every 14 (fourteen) days.

## 2025-01-14 ENCOUNTER — PATIENT MESSAGE (OUTPATIENT)
Dept: FAMILY MEDICINE | Facility: CLINIC | Age: 60
End: 2025-01-14
Payer: COMMERCIAL

## 2025-02-05 ENCOUNTER — OFFICE VISIT (OUTPATIENT)
Dept: FAMILY MEDICINE | Facility: CLINIC | Age: 60
End: 2025-02-05
Payer: COMMERCIAL

## 2025-02-05 VITALS
SYSTOLIC BLOOD PRESSURE: 124 MMHG | HEIGHT: 71 IN | OXYGEN SATURATION: 97 % | DIASTOLIC BLOOD PRESSURE: 72 MMHG | BODY MASS INDEX: 31.79 KG/M2 | HEART RATE: 65 BPM | WEIGHT: 227.06 LBS

## 2025-02-05 DIAGNOSIS — Z79.01 CHRONIC ANTICOAGULATION: ICD-10-CM

## 2025-02-05 DIAGNOSIS — M54.2 NECK PAIN ON LEFT SIDE: Primary | ICD-10-CM

## 2025-02-05 DIAGNOSIS — I82.401 RECURRENT ACUTE DEEP VEIN THROMBOSIS (DVT) OF RIGHT LOWER EXTREMITY: ICD-10-CM

## 2025-02-05 PROCEDURE — 3074F SYST BP LT 130 MM HG: CPT | Mod: CPTII,S$GLB,, | Performed by: NURSE PRACTITIONER

## 2025-02-05 PROCEDURE — 99214 OFFICE O/P EST MOD 30 MIN: CPT | Mod: S$GLB,,, | Performed by: NURSE PRACTITIONER

## 2025-02-05 PROCEDURE — 3008F BODY MASS INDEX DOCD: CPT | Mod: CPTII,S$GLB,, | Performed by: NURSE PRACTITIONER

## 2025-02-05 PROCEDURE — 3078F DIAST BP <80 MM HG: CPT | Mod: CPTII,S$GLB,, | Performed by: NURSE PRACTITIONER

## 2025-02-05 PROCEDURE — 4010F ACE/ARB THERAPY RXD/TAKEN: CPT | Mod: CPTII,S$GLB,, | Performed by: NURSE PRACTITIONER

## 2025-02-05 PROCEDURE — 99999 PR PBB SHADOW E&M-EST. PATIENT-LVL III: CPT | Mod: PBBFAC,,, | Performed by: NURSE PRACTITIONER

## 2025-02-05 PROCEDURE — 1159F MED LIST DOCD IN RCRD: CPT | Mod: CPTII,S$GLB,, | Performed by: NURSE PRACTITIONER

## 2025-02-05 RX ORDER — PREDNISONE 10 MG/1
TABLET ORAL
Qty: 20 TABLET | Refills: 0 | Status: SHIPPED | OUTPATIENT
Start: 2025-02-05

## 2025-02-05 RX ORDER — TIZANIDINE 4 MG/1
4 TABLET ORAL NIGHTLY PRN
Qty: 20 TABLET | Refills: 0 | Status: SHIPPED | OUTPATIENT
Start: 2025-02-05 | End: 2025-02-14 | Stop reason: SDUPTHER

## 2025-02-05 NOTE — PROGRESS NOTES
Subjective:       Patient ID: Omid Falcon is a 59 y.o. male.    Chief Complaint: Neck Pain    HPI  3 weeks ago reports left sided neck pain and tension without trigger. +spasms. Denies midline pain, heavy lighting, pop or tear. Denies radiating pain, numbness, tingling, or weakness. Taking otc tylenol without relief. Avoids NSAIDs in setting of chronic anticoagulation (DVT)       Vitals:    02/05/25 0950   BP: 124/72   Pulse: 65     Review of Systems   Constitutional:  Negative for activity change and unexpected weight change.   HENT:  Positive for hearing loss. Negative for rhinorrhea and trouble swallowing.    Eyes:  Negative for discharge and visual disturbance.   Respiratory:  Negative for chest tightness and wheezing.    Cardiovascular:  Negative for chest pain and palpitations.   Gastrointestinal:  Negative for blood in stool, constipation, diarrhea and vomiting.   Endocrine: Negative for polydipsia and polyuria.   Genitourinary:  Negative for difficulty urinating, hematuria and urgency.   Musculoskeletal:  Positive for neck pain. Negative for arthralgias and joint swelling.   Neurological:  Negative for dizziness, weakness and numbness.   Psychiatric/Behavioral:  Negative for confusion and dysphoric mood.        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     Appearance: He is well-developed. He is not ill-appearing, toxic-appearing or diaphoretic.   HENT:      Right Ear: Hearing normal.      Left Ear: Hearing normal.   Pulmonary:      Effort: No tachypnea or respiratory distress.   Musculoskeletal:      Cervical back: No edema, erythema, rigidity or crepitus. Muscular  tenderness present. No spinous process tenderness.   Skin:     Coloration: Skin is not pale.   Neurological:      Mental Status: He is alert and oriented to person, place, and time.      Sensory: Sensation is intact.      Motor: No weakness or tremor.      Coordination: Coordination normal.   Psychiatric:         Speech: Speech normal.         Behavior: Behavior normal.         Thought Content: Thought content normal.         Judgment: Judgment normal.         Assessment:       1. Neck pain on left side    2. Recurrent acute deep vein thrombosis (DVT) of right lower extremity    3. Chronic anticoagulation        Plan:       Neck pain on left side  -     predniSONE (DELTASONE) 10 MG tablet; Take 4 tabs daily for 2 days, then 3 tabs daily for 2 days, then 2 tabs daily for 2 days, then 1 tab daily for 2 days, then stop  Dispense: 20 tablet; Refill: 0  -     tiZANidine (ZANAFLEX) 4 MG tablet; Take 1 tablet (4 mg total) by mouth nightly as needed (muscle spasm).  Dispense: 20 tablet; Refill: 0    Recurrent acute deep vein thrombosis (DVT) of right lower extremity  Chronic anticoagulation   On Eliquis; avoid NSAIDs         education provided on supportive care, symptom monitoring and return precautions       Medication List with Changes/Refills   New Medications    PREDNISONE (DELTASONE) 10 MG TABLET    Take 4 tabs daily for 2 days, then 3 tabs daily for 2 days, then 2 tabs daily for 2 days, then 1 tab daily for 2 days, then stop    TIZANIDINE (ZANAFLEX) 4 MG TABLET    Take 1 tablet (4 mg total) by mouth nightly as needed (muscle spasm).   Current Medications    ALBUTEROL (VENTOLIN HFA) 90 MCG/ACTUATION INHALER    Inhale 2 puffs into the lungs every 6 (six) hours as needed for Wheezing. Rescue    ALLOPURINOL (ZYLOPRIM) 300 MG TABLET    TAKE ONE TABLET BY MOUTH ONCE DAILY    APIXABAN (ELIQUIS) 5 MG TAB    Take 1 tablet (5 mg total) by mouth 2 (two) times daily.    BLOOD SUGAR DIAGNOSTIC STRP    1 strip by Other route.     CHOLESTYRAMINE (QUESTRAN) 4 GRAM PACKET    TAKE 1\2 PACKET BY MOUTH THREE TIMES DAILY WITH MEALS    COLCHICINE, GOUT, (COLCRYS) 0.6 MG TABLET    1.2 mg PO x 1, then 0.6 mg PO 1 hour later x 1 (do not repeat again for 3 days)    DOCOSAHEXAENOIC ACID ORAL    Take 1 g by mouth 2 (two) times daily.    PANTOPRAZOLE (PROTONIX) 40 MG TABLET    Take 1 tablet (40 mg total) by mouth once daily.    SEMAGLUTIDE (OZEMPIC) 0.25 MG OR 0.5 MG (2 MG/3 ML) PEN INJECTOR    Inject 0.5 mg into the skin every 7 days.    VALSARTAN (DIOVAN) 40 MG TABLET    Take 1 tablet (40 mg total) by mouth once daily.

## 2025-02-14 ENCOUNTER — PATIENT MESSAGE (OUTPATIENT)
Dept: FAMILY MEDICINE | Facility: CLINIC | Age: 60
End: 2025-02-14
Payer: COMMERCIAL

## 2025-02-14 DIAGNOSIS — M54.2 NECK PAIN ON LEFT SIDE: ICD-10-CM

## 2025-02-14 RX ORDER — DICLOFENAC SODIUM 75 MG/1
75 TABLET, DELAYED RELEASE ORAL 2 TIMES DAILY
Qty: 60 TABLET | Refills: 0 | Status: SHIPPED | OUTPATIENT
Start: 2025-02-14

## 2025-02-14 RX ORDER — TIZANIDINE 4 MG/1
4 TABLET ORAL NIGHTLY PRN
Qty: 40 TABLET | Refills: 0 | Status: SHIPPED | OUTPATIENT
Start: 2025-02-14

## 2025-02-14 NOTE — TELEPHONE ENCOUNTER
I refilled the muscle relaxer.  I sent in Diclofenac as an antiinflammatory.  I placed a referral for Physical therapy

## 2025-02-19 ENCOUNTER — CLINICAL SUPPORT (OUTPATIENT)
Dept: REHABILITATION | Facility: HOSPITAL | Age: 60
End: 2025-02-19
Payer: COMMERCIAL

## 2025-02-19 DIAGNOSIS — M54.2 NECK PAIN ON LEFT SIDE: ICD-10-CM

## 2025-02-19 PROCEDURE — 97161 PT EVAL LOW COMPLEX 20 MIN: CPT | Mod: PN | Performed by: PHYSICAL THERAPIST

## 2025-02-19 PROCEDURE — 97012 MECHANICAL TRACTION THERAPY: CPT | Mod: PN | Performed by: PHYSICAL THERAPIST

## 2025-02-20 NOTE — PROGRESS NOTES
Outpatient Rehab    Physical Therapy Evaluation    Patient Name: Omid Falcon  MRN: [id number]  YOB: 1965  Today's Date: 2/20/2025    Therapy Diagnosis:   Encounter Diagnosis   Name Primary?    Neck pain on left side      Physician: Josh Lizama MD    Physician Orders: Eval and Treat  Medical Diagnosis: Neck pain on left side [M54.2]     Visit # / Visits Authorized:  1 / 1 - Evaluation   Date of Evaluation:  2/19/2025   Insurance Authorization Period: 2/14/25 to 2/14/26  Plan of Care Certification:  2/19/2025 to 4/2/25      Time In: 1500   Time Out: 1610  Total Time: 70   Total Billable Time: 70    Intake Outcome Measure for FOTO Survey    Therapist reviewed FOTO scores for Omid Falcon on 2/19/2025.   FOTO report - see Media section or FOTO account episode details.     Intake Score: 52%         Subjective   History of Present Illness  Omid is a 59 y.o. male who reports to physical therapy with a chief concern of L sided cervical pain. According to the patient's chart, Omid has a past medical history of Benign essential HTN, Cancer, Diarrhea, Gout, Kidney stone, Malignant carcinoid tumor of ileum, Malignant carcinoid tumors of other sites, and PONV (postoperative nausea and vomiting). Omid has a past surgical history that includes Back surgery; Knee surgery; Exploratory laparotomy (N/A, 06/18/2018); Liver resection (N/A, 06/18/2018); Cholecystectomy (N/A, 06/18/2018); Abdominal hernia repair (Bilateral, 06/18/2018); Colonoscopy (N/A, 07/24/2017); robotic arthroplasty, knee (Right, 05/16/2023); colectomy, right (Right, 02/02/2016); and Small intestine surgery (N/A, 02/02/2016).                History of Present Condition/Illness: Patient is a 59 y.o. M presenting to outpatient PT with reports of L sided neck pain - specifically looking L and left and down. States that it started a few weeks ago with no known mechanism and no trauma. Denies numbness/tingling and weakness in L upper  extremity and states that pain is local to upper/middle neck on L side. Reports that over the counter medication has been helping manage symptoms but his range of motion is still limited. Notes increased tenderness to touch over L side of neck. Wants to to improve mobility, decrease pain, and return to prior level of function.     Activities of Daily Living  Social history was obtained from Patient.          Patient Responsibilities: Driving, Community mobility, Financial management, Health management, Home management, Laundry, Meal prep, Personal ADL, Shopping, Yard work    Previously independent with activities of daily living? Yes     Currently independent with activities of daily living? Yes          Previously independent with instrumental activities of daily living? Yes     Currently independent with instrumental activities of daily living? Yes              Pain     Patient reports a current pain level of 2/10. Pain at best is reported as 0/10. Pain at worst is reported as 10/10.   Location: L cervical spine  Clinical Progression (since onset): Stable         Employment  Patient reports: Does the patient's condition impact their ability to work?  Employment Status: Employed full-time   Patient is a residential contractor. Pain and mobility deficits minimally impact job       Past Medical History/Physical Systems Review:   Omid Falcon  has a past medical history of Benign essential HTN, Cancer, Diarrhea, Gout, Kidney stone, Malignant carcinoid tumor of ileum, Malignant carcinoid tumors of other sites, and PONV (postoperative nausea and vomiting).    Omid Falcon  has a past surgical history that includes Back surgery; Knee surgery; Exploratory laparotomy (N/A, 06/18/2018); Liver resection (N/A, 06/18/2018); Cholecystectomy (N/A, 06/18/2018); Abdominal hernia repair (Bilateral, 06/18/2018); Colonoscopy (N/A, 07/24/2017); robotic arthroplasty, knee (Right, 05/16/2023); colectomy, right (Right,  02/02/2016); and Small intestine surgery (N/A, 02/02/2016).    Omid has a current medication list which includes the following prescription(s): allopurinol, apixaban, blood sugar diagnostic, cholestyramine, colchicine, diclofenac, docosahexaenoic acid, pantoprazole, prednisone, semaglutide, tizanidine, and valsartan.    Review of patient's allergies indicates:   Allergen Reactions    Ceftriaxone Hives, Itching and Other (See Comments)     Coughing and rhinitis  Flushing feeling  Coughing and rhinitis  Other reaction(s): Other (See Comments)  Coughing and rhinitis  Flushing feeling    Epinephrine Other (See Comments)     With chemo meds      Will cause cardiac arrest-per patient    Dermabond [tissue glues] Hives    Adhesive Hives, Other (See Comments) and Rash    Haptens - plastic and glue series Rash     Durabond        Objective   Posture  Patient presents with a Forward head position.     Shoulders are Elevated.             Cervical Thoracic Sensation  Right Cervical/Thoracic Sensation  Intact: Light Touch       Left Cervical/Thoracic Sensation  Intact: Light Touch                Spinal Mobility  Hypomobile: Cervical  Cervical Mobility Details: Lower cervical spine hypomobility with L side glides and down glides    Spinal Muscle Palpation     Minimal to no tenderness in R cervical musculature       Moderate tenderness in upper to mid cervical paraspinals     Vertebral Palpation  Vertebral Structures Palpated  Cervical: Left Transverse Process and Left Facet Joint  Cervical Spine Structures Palpated  Upper Cervical Spine: Left Transverse Process and Left Facet Joint  Middle Cervical Spine: Left Transverse Process and Left Facet Joint       Moderate tenderness along mid to upper cervical facet joints and transverse processes        Subcranial Range of Motion   Active Restricted? Passive Restricted? Pain   Flexion No No     Protraction No No     Retraction No No       Cervical Range of Motion   Active (deg) Passive  (deg) Pain   Flexion 40       Extension 65       Right Lateral Flexion 30       Right Rotation 65 70     Left Lateral Flexion 15       Left Rotation 40 45            Shoulder Range of Motion     Shoulder, Elbow, or Forearm Range of Motion Details: Shoulder range of motion screen WNL bilaterally with no provocation of familiar cervical pain         Cervical Strength   Strength Pain   Flexion (C1/C2) 4+ Yes   Extension 4+     Right Lateral Flexion (C3)       Left Lateral Flexion (C3)       Right Rotation       Left Rotation           Shoulder Strength - Planes of Motion   Right Strength Right Pain Left Strength Left  Pain   Flexion 5   5     Extension           ABduction 5   5 Yes   ADduction           Horizontal ABduction           Horizontal ADduction           Internal Rotation 0°           Internal Rotation 90°           External Rotation 0° 5   5     External Rotation 90°                              Cervical Screen  Tests  Positive: Left Spurling's A  Negative: Right Spurling's A  Cervical Range of Motion           Thoracic Range of Motion             Cervical/Thoracic Special Tests            Cervical Tests  Positive: Left Spurling's A  Negative: Right Spurling's A                  Treatment:  Mechanical Traction:  Cervical traction w/ 25 lb pull (30 second hold) and 10 lb pull (10 sec rest) x 10 minutes total     Therapeutic Exercise:  Home exercise program education and review     Assessment & Plan   Assessment  Omid presents with a condition of Low complexity.   Presentation of Symptoms: Stable  Will Comorbidities Impact Care: No       Functional Limitations: Completing work/school activities, Driving, Functional mobility, Range of motion, Participating in leisure activities, Pain with ADLs/IADLs  Impairments: Abnormal or restricted range of motion, Lack of appropriate home exercise program, Pain with functional activity    Patient Goal for Therapy (PT): Reduce neck pain and return to prior level of  function  Prognosis: Excellent  Prognosis Details: Excellent prognosis due to patient motivated to return to prior level of function, acute onset of symptoms, pain is local and specific, and high prior level of function.   Assessment Details: Patient is a 59 y.o. M presenting to physical therapy with chief complaint of local L sided cervical pain. Increased pain and decreased range of motion are impacting daily activities such as driving and various work duties as residential contractor. No radicular symptoms noted during examination and no neurological signs observed. Pain is local to upper/middle L cervical and concordant pain elicited with L upper/middle facet side glides and down glides. Patient presents with s/s consistent with cervical facet irritation with associated soft tissue and muscular impairments. Positive response to manual and mechanical traction today. Patient is a good candidate for skilled physical therapy interventions to address aforementioned impairments and return to prior level of function.     Plan  From a physical therapy perspective, the patient would benefit from: Skilled Rehab Services    Planned therapy interventions include: Therapeutic exercise, Therapeutic activities, Neuromuscular re-education, and Manual therapy.    Planned modalities to include: Electrical stimulation - attended, Electrical stimulation - passive/unattended, Mechanical traction, Other (Comment), Thermotherapy (hot pack), and Cryotherapy (cold pack). Dry needling, high-velocity low amplitude thrust       Visit Frequency: 1 times Per Week for 6 Weeks.       This plan was discussed with Patient.   Discussion participants: Agreed Upon Plan of Care             Patient's spiritual, cultural, and educational needs considered and patient agreeable to plan of care and goals.     Education  Education was done with Patient. The patient's learning style includes Demonstration, Listening, and Pictures/video. The patient  Demonstrates understanding and Verbalizes understanding.         Patient educated on exam findings, role of physical therapy, plan of care, and home exercise program       Goals:   Active       Long Term Goals       Patient will be independent with foundational and advanced home exercise programs to reduce chance of re-injury and return of symptoms       Start:  02/19/25    Expected End:  04/02/25            Patient will improve cervical FOTO score to 65/100 or greater to demonstrate improved tolerance for ADLs and recreational activities       Start:  02/19/25    Expected End:  04/02/25            Patient will improve L cervical rotation and lateral flexion range of motion to equal contralateral side without complaints of pain to activities such as impact driving        Start:  02/19/25    Expected End:  04/02/25                Regis Ricks PT, DPT

## 2025-02-20 NOTE — PATIENT INSTRUCTIONS
Home Exercise Program w/ sets/reps on printed handout:  Chin tucks   Cervical SNAGS  Self-traction with towel  Upper trap stretch  Levator Scap stretch

## 2025-03-07 ENCOUNTER — CLINICAL SUPPORT (OUTPATIENT)
Dept: REHABILITATION | Facility: HOSPITAL | Age: 60
End: 2025-03-07
Payer: COMMERCIAL

## 2025-03-07 DIAGNOSIS — M54.2 NECK PAIN ON LEFT SIDE: Primary | ICD-10-CM

## 2025-03-07 PROCEDURE — 97012 MECHANICAL TRACTION THERAPY: CPT | Mod: PN | Performed by: GENERAL ACUTE CARE HOSPITAL

## 2025-03-07 PROCEDURE — 97112 NEUROMUSCULAR REEDUCATION: CPT | Mod: PN | Performed by: GENERAL ACUTE CARE HOSPITAL

## 2025-03-07 NOTE — PROGRESS NOTES
Outpatient Rehab  Physical Therapy Visit    Patient Name: Omid Falcon  MRN: [id number]  YOB: 1965  Encounter Date: 3/7/2025    Therapy Diagnosis: No diagnosis found.  Physician: Josh Lizama MD    Physician Orders: Eval and Treat  Medical Diagnosis: Neck pain on left side [M54.2]     Visit # / Visits Authorized:  1 / 1   Date of Evaluation:  2/19/25  Insurance Authorization Period: 2/19/2025 - 12/31/2025   Plan of Care Certification:  2/19/2025 to 4/2/25      Time In: 1000   Time Out: 1050  Total Time: 50   Total Billable Time: 38    FOTO:  Intake Score:  %  Survey Score 1:  %  Survey Score 2:  %         Subjective   Patient arrives to outpatient physical therapy for first treatment session since initial evaluation and being out of town for a vacation. Patient reports decreased levels of neck pain after HEP prescribed at evaluation..  Pain reported as 2/10. L sided cervical spine    Objective            Treatment:  CPT Interventions & Parameters   NMR  (38) Seated chin tucks x 10 reps   Thoracic extension over foam roller x10 reps   Cervical towel rotation 4x15s bilateral  Chest doorway stretch 4x10s   Green theraband rows 3x10  Thoracic open books - red theraband standing x 10 reps  Red theraband scap clocks 3x5 re  Cervical isometrics into ball 15x3s flexion, 15x3s extension   UBE 3/3 - emphasis on upright posture    MT  (10) Cervical Mechanical traction - 25# 30s / 15# 10s x 10 minutes             *A portion of this treatment session is provided with the assistance of a skilled rehabilitation technician under the supervision of a licensed physical therapist  *Billing accurately reflects 1:1 treatment time per patient's insurance guidelines.     Home Exercises and Patient Education Reviewed   Patient was educated on the role of Physical Therapy, Treatment plan, Discharge Goals, Home Exercise Program.  Patient educated on biomechanical justification for therapeutic exercise and importance  of compliance with Home Exercise Program in order to improve overall impairments and Quality of Life.  Patient was educated on all the above exercise prior/during/after for proper posture, positioning, and execution for safe performance with home exercise program.     Disclaimer: This note was prepared using a voice recognition system and is likely to have sound alike errors within the text.    Assessment & Plan   Assessment: Patient perform well during treatment session. He does require verbal tactile and visual cueing for correct cervical and thoracic posture with all exercises. Mechanical traction interventions continued during treatment today.  Evaluation/Treatment Tolerance: Patient tolerated treatment well    Patient will continue to benefit from skilled outpatient physical therapy to address the deficits listed in the problem list box on initial evaluation, provide pt/family education and to maximize pt's level of independence in the home and community environment.     Patient's spiritual, cultural, and educational needs considered and patient agreeable to plan of care and goals.           Plan: Continue with current program to impact cervical and thoracic posture and mobility as prescribed. Patient provided with red theraband to perform exercises at home.    Goals:   Active       Long Term Goals       Patient will be independent with foundational and advanced home exercise programs to reduce chance of re-injury and return of symptoms (Progressing)       Start:  02/19/25    Expected End:  04/02/25            Patient will improve cervical FOTO score to 65/100 or greater to demonstrate improved tolerance for ADLs and recreational activities (Progressing)       Start:  02/19/25    Expected End:  04/02/25            Patient will improve L cervical rotation and lateral flexion range of motion to equal contralateral side without complaints of pain to activities such as impact driving  (Progressing)       Start:   02/19/25    Expected End:  04/02/25                Yesy Reno, PT, DPT

## 2025-04-19 ENCOUNTER — E-VISIT (OUTPATIENT)
Dept: FAMILY MEDICINE | Facility: CLINIC | Age: 60
End: 2025-04-19
Payer: COMMERCIAL

## 2025-04-19 DIAGNOSIS — M15.0 PRIMARY OSTEOARTHRITIS INVOLVING MULTIPLE JOINTS: Primary | ICD-10-CM

## 2025-04-19 DIAGNOSIS — M54.2 NECK PAIN ON LEFT SIDE: ICD-10-CM

## 2025-04-23 RX ORDER — DICLOFENAC SODIUM 75 MG/1
75 TABLET, DELAYED RELEASE ORAL 2 TIMES DAILY
Qty: 60 TABLET | Refills: 11 | Status: SHIPPED | OUTPATIENT
Start: 2025-04-23

## 2025-04-23 NOTE — PROGRESS NOTES
Patient ID: Omid Falcon is a 59 y.o. male.    Chief Complaint: General Illness (Entered automatically based on patient selection in [employer].)    The patient initiated a request through [employer] on 4/19/2025 for evaluation and management with a chief complaint of General Illness (Entered automatically based on patient selection in [employer].)     I evaluated the questionnaire submission on 4/23/25.    [facility] Peq Evisit [facility]-Muscle,Back,Joint    4/19/2025  7:46 AM CDT - Filed by Patient   What do you need help with? Other Concern   Do you agree to participate in an E-Visit? Yes   If you have any of the following symptoms, please go to the nearest emergency room or call 911: I acknowledge   What is the main issue you would like addressed today? Swelling and stiffness of the joints, hands, knees, ankles, etc.   Please describe your symptoms. Possible inflammation.   Where is your problem located? Hands, knees, ankles, fingers, etc.   On a scale of 1-10, where 10 is the worst you can imagine, how severe are your symptoms? (range: 1 - 10) 5   Have you had these symptoms before? Yes   How long have you had these symptoms? About a week   What helps with your symptoms? Advil   What makes your symptoms feel worse? N/a   Are your symptoms related to a condition you currently have? No   Please describe any probable cause for your symptoms. Old age, maybe   Provide any additional information you feel is important.    Please attach any relevant images or files    Are you able to take your vital signs? Yes   Systolic Blood Pressure: 128   Diastolic Blood Pressure: 78   Weight: 215   Height: 510   Pulse:    Temperature: 97   Respiration rate:    Pulse Oxygen:          Encounter Diagnoses   Name Primary?    Primary osteoarthritis involving multiple joints Yes    Neck pain on left side         No orders of the defined types were placed in this encounter.     Medications Ordered This Encounter   Medications    diclofenac  (VOLTAREN) 75 MG EC tablet     Sig: Take 1 tablet (75 mg total) by mouth 2 (two) times daily.     Dispense:  60 tablet     Refill:  11        No follow-ups on file.      E-Visit Time Tracking:    Day 1 Time (in minutes): 5    Total Time (in minutes): 5

## (undated) DEVICE — CATH ALL PUR URTHL 20FR

## (undated) DEVICE — SUT MONOCRYL 3-0 PS-1

## (undated) DEVICE — TRAY FOLEY 16FR INFECTION CONT

## (undated) DEVICE — DRAPE INCISE IOBAN 2 23X23IN

## (undated) DEVICE — KIT POWDER ABSORBABLE GELATIN

## (undated) DEVICE — SEE MEDLINE ITEM 157125

## (undated) DEVICE — ADHESIVE DERMABOND ADVANCED

## (undated) DEVICE — Device

## (undated) DEVICE — SUT 4/0 27IN PDS II VIO MON

## (undated) DEVICE — SUT PROLENE 2-0 SH 36IN BLU

## (undated) DEVICE — SOL NACL 0.9% INJ PF/100 150

## (undated) DEVICE — SUT 1 36IN PDS II

## (undated) DEVICE — SET DECANTER MEDICHOICE

## (undated) DEVICE — MANIFOLD 4 PORT

## (undated) DEVICE — SPONGE LAP 18X18 PREWASHED

## (undated) DEVICE — SUT PROLENE 6-0 C-1 30IN BL

## (undated) DEVICE — HANDPIECE PHOTONSABER Y TIP

## (undated) DEVICE — DEVICE COLLECT FOR COLLAGEN

## (undated) DEVICE — GLOVE SURG BIOGEL LATEX SZ 7.5

## (undated) DEVICE — DRESSING AQUACEL SACRAL 9 X 9

## (undated) DEVICE — SUT PROLENE 5-0 36IN C-1

## (undated) DEVICE — SUT 2 60IN PROLENE BL MONO

## (undated) DEVICE — ELECTRODE REM PLYHSV RETURN 9

## (undated) DEVICE — SUT PROLENE 3-0 30SH

## (undated) DEVICE — SUPPORT ULNA NERVE PROTECTOR

## (undated) DEVICE — VITAGEL SPRAY SET 5/PK

## (undated) DEVICE — HEMOSTAT VITAGEL RT 4.5

## (undated) DEVICE — SUT PROLENE 2-0 36IN MH BLU